# Patient Record
Sex: MALE | Race: BLACK OR AFRICAN AMERICAN | NOT HISPANIC OR LATINO | Employment: OTHER | URBAN - METROPOLITAN AREA
[De-identification: names, ages, dates, MRNs, and addresses within clinical notes are randomized per-mention and may not be internally consistent; named-entity substitution may affect disease eponyms.]

---

## 2018-01-16 ENCOUNTER — HOSPITAL ENCOUNTER (EMERGENCY)
Facility: HOSPITAL | Age: 68
Discharge: HOME | End: 2018-01-16
Attending: EMERGENCY MEDICINE
Payer: COMMERCIAL

## 2018-01-16 VITALS
SYSTOLIC BLOOD PRESSURE: 152 MMHG | BODY MASS INDEX: 25.73 KG/M2 | HEIGHT: 72 IN | TEMPERATURE: 98 F | WEIGHT: 190 LBS | RESPIRATION RATE: 18 BRPM | OXYGEN SATURATION: 99 % | DIASTOLIC BLOOD PRESSURE: 92 MMHG | HEART RATE: 68 BPM

## 2018-01-16 DIAGNOSIS — R07.9 CHEST PAIN: ICD-10-CM

## 2018-01-16 DIAGNOSIS — T14.8XXA MUSCLE STRAIN: Primary | ICD-10-CM
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
## 2018-01-16 LAB
ALBUMIN SERPL BCP-MCNC: 3.7 G/DL
ALP SERPL-CCNC: 70 U/L
ALT SERPL W/O P-5'-P-CCNC: 38 U/L
ANION GAP SERPL CALC-SCNC: 7 MMOL/L
AST SERPL-CCNC: 27 U/L
BASOPHILS # BLD AUTO: 0.04 K/UL
BASOPHILS NFR BLD: 0.7 %
BILIRUB SERPL-MCNC: 0.4 MG/DL
BUN SERPL-MCNC: 13 MG/DL
CALCIUM SERPL-MCNC: 9.4 MG/DL
CHLORIDE SERPL-SCNC: 104 MMOL/L
CO2 SERPL-SCNC: 30 MMOL/L
CREAT SERPL-MCNC: 1.3 MG/DL
DIFFERENTIAL METHOD: ABNORMAL
EOSINOPHIL # BLD AUTO: 0.2 K/UL
EOSINOPHIL NFR BLD: 3.3 %
ERYTHROCYTE [DISTWIDTH] IN BLOOD BY AUTOMATED COUNT: 12.6 %
EST. GFR  (AFRICAN AMERICAN): >60 ML/MIN/1.73 M^2
EST. GFR  (NON AFRICAN AMERICAN): 57.3 ML/MIN/1.73 M^2
GLUCOSE SERPL-MCNC: 103 MG/DL
HCT VFR BLD AUTO: 37.8 %
HGB BLD-MCNC: 13 G/DL
IMM GRANULOCYTES # BLD AUTO: 0.01 K/UL
IMM GRANULOCYTES NFR BLD AUTO: 0.2 %
LYMPHOCYTES # BLD AUTO: 2.8 K/UL
LYMPHOCYTES NFR BLD: 47.6 %
MCH RBC QN AUTO: 31 PG
MCHC RBC AUTO-ENTMCNC: 34.4 G/DL
MCV RBC AUTO: 90 FL
MONOCYTES # BLD AUTO: 0.4 K/UL
MONOCYTES NFR BLD: 6.7 %
NEUTROPHILS # BLD AUTO: 2.4 K/UL
NEUTROPHILS NFR BLD: 41.5 %
NRBC BLD-RTO: 0 /100 WBC
PLATELET # BLD AUTO: 179 K/UL
PMV BLD AUTO: 10.4 FL
POTASSIUM SERPL-SCNC: 3.9 MMOL/L
PROT SERPL-MCNC: 7.7 G/DL
RBC # BLD AUTO: 4.2 M/UL
SODIUM SERPL-SCNC: 141 MMOL/L
TROPONIN I SERPL DL<=0.01 NG/ML-MCNC: <0.006 NG/ML
WBC # BLD AUTO: 5.84 K/UL

## 2018-01-16 PROCEDURE — 93010 ELECTROCARDIOGRAM REPORT: CPT | Mod: ,,, | Performed by: INTERNAL MEDICINE

## 2018-01-16 PROCEDURE — 80053 COMPREHEN METABOLIC PANEL: CPT

## 2018-01-16 PROCEDURE — 84484 ASSAY OF TROPONIN QUANT: CPT

## 2018-01-16 PROCEDURE — 85025 COMPLETE CBC W/AUTO DIFF WBC: CPT

## 2018-01-16 PROCEDURE — 93005 ELECTROCARDIOGRAM TRACING: CPT

## 2018-01-16 PROCEDURE — 99285 EMERGENCY DEPT VISIT HI MDM: CPT | Mod: SA,,, | Performed by: PHYSICIAN ASSISTANT

## 2018-01-16 PROCEDURE — 99284 EMERGENCY DEPT VISIT MOD MDM: CPT | Mod: 25

## 2018-01-16 PROCEDURE — 25000003 PHARM REV CODE 250: Performed by: EMERGENCY MEDICINE

## 2018-01-16 RX ORDER — ASPIRIN 325 MG
325 TABLET ORAL
Status: COMPLETED | OUTPATIENT
Start: 2018-01-16 | End: 2018-01-16

## 2018-01-16 RX ORDER — NAPROXEN 500 MG/1
500 TABLET ORAL 2 TIMES DAILY WITH MEALS
Qty: 12 TABLET | Refills: 0 | Status: SHIPPED | OUTPATIENT
Start: 2018-01-16 | End: 2019-12-16

## 2018-01-16 RX ORDER — NAPROXEN SODIUM 220 MG/1
325 TABLET, FILM COATED ORAL
Status: DISCONTINUED | OUTPATIENT
Start: 2018-01-16 | End: 2018-01-16

## 2018-01-16 RX ADMIN — ASPIRIN 325 MG ORAL TABLET 325 MG: 325 PILL ORAL at 04:01

## 2018-01-16 NOTE — ED TRIAGE NOTES
Presents to ER with right upper chest pain since this am.    Pt identifiers checked and correct  LOC: The patient is awake, alert, aware of environment with an appropriate affect. Oriented x3, speaking appropriately  APPEARANCE: Pt resting comfortably, in no acute distress, pt is clean and well groomed, clothing properly fastened  SKIN: Skin warm, dry and intact, normal skin turgor, moist mucus membranes  RESPIRATORY: Airway is open and patent, respirations are spontaneous, even and unlabored, normal effort and rate  MUSCULOSKELETAL: No obvious deformities.

## 2018-01-16 NOTE — ED PROVIDER NOTES
I begin is a full preventive male Encounter Date: 1/16/2018    SCRIBE #1 NOTE: I, Ashlee Billingsley, am scribing for, and in the presence of,  Dr. Torres. I have scribed the following portions of the note - the EKG reading.       History     Chief Complaint   Patient presents with    Chest Pain     denies cardiac hx, pain to r side of chest increases with 'turning     Patient is a healthy 67-year-old gentleman with a past medical hypertension and HLD who presents the ED with right-sided rib pain.  Patient states that earlier, he was helping someone push up there window when he developed acute onset of right-sided back pain.  His rib pain has been constant.  He complains of 6/10 pain to the area.  The pain is not worse with deep inspiration.  He denies any fever, chills, cough, or SOB.  He did not take anything for pain relief.  Patient states that he works out 4 times a week and does not develop any symptoms.          Review of patient's allergies indicates:  No Known Allergies  Past Medical History:   Diagnosis Date    Hypertension      Past Surgical History:   Procedure Laterality Date    CARPAL TUNNEL RELEASE      right/left    SHOULDER OPEN ROTATOR CUFF REPAIR      left    WRIST SURGERY      right/left     Family History   Problem Relation Age of Onset    Diabetes Mother     Stroke Father      Social History   Substance Use Topics    Smoking status: Never Smoker    Smokeless tobacco: Never Used    Alcohol use No     Review of Systems   Constitutional: Negative for chills and fever.   HENT: Negative for sore throat.    Respiratory: Negative for cough and shortness of breath.    Cardiovascular: Positive for chest pain. Negative for leg swelling.   Gastrointestinal: Negative for nausea.   Genitourinary: Negative for dysuria.   Musculoskeletal: Negative for back pain.   Skin: Negative for rash.   Neurological: Negative for weakness.   Hematological: Does not bruise/bleed easily.       Physical Exam     Initial  Vitals [01/16/18 1359]   BP Pulse Resp Temp SpO2   136/82 82 16 97.7 °F (36.5 °C) 98 %      MAP       100         Physical Exam    Vitals reviewed.  Constitutional: He appears well-developed and well-nourished. He is not diaphoretic. No distress.   HENT:   Head: Normocephalic and atraumatic.   Nose: Nose normal.   Eyes: Conjunctivae and EOM are normal.   Neck: Normal range of motion.   Cardiovascular: Normal rate, regular rhythm and normal heart sounds. Exam reveals no friction rub.    No murmur heard.  No calf pain or peripheral edema.   Pulmonary/Chest: Breath sounds normal. No respiratory distress. He has no wheezes. He has no rales.   No chest wall tenderness to palpation.   Abdominal: Soft. Bowel sounds are normal. He exhibits no distension. There is no tenderness.   Musculoskeletal: Normal range of motion.   Neurological: He is alert and oriented to person, place, and time. He has normal strength. No sensory deficit.   Skin: Skin is warm and dry. No erythema.   Skin without erythema, edema, ecchymosis, or rashes.   Psychiatric: He has a normal mood and affect. Thought content normal.         ED Course   Procedures  Labs Reviewed   CBC W/ AUTO DIFFERENTIAL - Abnormal; Notable for the following:        Result Value    RBC 4.20 (*)     Hemoglobin 13.0 (*)     Hematocrit 37.8 (*)     All other components within normal limits   COMPREHENSIVE METABOLIC PANEL - Abnormal; Notable for the following:     CO2 30 (*)     Anion Gap 7 (*)     eGFR if non  57.3 (*)     All other components within normal limits   TROPONIN I     EKG Readings: (Independently Interpreted)   NSR at 86 BPM.       X-Rays:   Independently Interpreted Readings:   Chest X-Ray: Normal heart size.  No infiltrates.  No acute abnormalities.     Medical Decision Making:   History:   Old Medical Records: I decided to obtain old medical records.  Independently Interpreted Test(s):   I have ordered and independently interpreted EKG Reading(s)  - see prior notes  Clinical Tests:   Lab Tests: Ordered and Reviewed  Radiological Study: Ordered and Reviewed  Medical Tests: Reviewed and Ordered    Imaging Results          X-Ray Chest PA And Lateral (Final result)  Result time 01/16/18 17:50:49    Final result by Sid Mattson MD (01/16/18 17:50:49)                 Impression:      No acute cardiothoracic abnormalities.  ______________________________________     Electronically signed by resident: CECELIA MARTELL  Date:     01/16/18  Time:    17:37            As the supervising and teaching physician, I personally reviewed the images and resident's interpretation and I agree with the findings.          Electronically signed by: SID MATTSON MD, MD  Date:     01/16/18  Time:    17:50              Narrative:    CLINICAL HISTORY:  Chest pain.    TECHNIQUE: PA and lateral views of the chest    COMPARISON: No priors      FINDINGS:  Support devices: None    Chest: Cardiac and mediastinal silhouettes are normal.  Lungs are well aerated and clear.  No pleural effusion or pneumothorax. Included osseous structures appear intact.    Upper Abdomen: Normal.                                 APC / Resident Notes:   Patient presents the ED with right sided rib/chest pain this morning.    On exam, vital signs stable.  Heart and lung exam unremarkable. No chest wall tenderness to palpation.      Will do ACS workup given history of high blood pressure and HLD, although low suspicion.    ECG with NSR at 86 bpm.  CBC with no leukocytosis.  H/H of 13/37.8.  CMP with a little abnormalities.    Negative troponin.    Chest x-ray with no acute cardiothoracic abnormalities.  Osseous structures appear intact.      Patient updated with results.  He reports feeling fine.  I will treat patient for chest wall muscle strain.  I will prescribe her naproxen for pain relief.  He is to follow-up with PCP. Return to ED precaution given.  Patient is comfortable with plan and stable for discharge.  I  reviewed patient's chart and discussed this case with my supervising LENORE Lewis Attestation:   Scribe #1: I performed the above scribed service and the documentation accurately describes the services I performed. I attest to the accuracy of the note.    Attending Attestation:     Physician Attestation Statement for NP/PA:   I discussed this assessment and plan of this patient with the NP/PA, but I did not personally examine the patient. The face to face encounter was performed by the NP/PA.    Other NP/PA Attestation Additions:    History of Present Illness: 67-year-old man complains of chest pain after heavy lifting.    Medical Decision Making: This occurred this morning and the pain has been present since then.  One troponin is sufficient to rule out ACS.  This was sent and this was normal.  Suspect musculoskeletal chest pain.       Physician Attestation for Scribe:      Comments: I, Dr. January Herr, personally performed the services described in this documentation. All medical record entries made by the scribe were at my direction and in my presence.  I have reviewed the chart and agree that the record reflects my personal performance and is accurate and complete. January Herr MD.              ED Course      Clinical Impression:   The primary encounter diagnosis was Muscle strain. A diagnosis of Chest pain was also pertinent to this visit.    Disposition:   Disposition: Discharged  Condition: Stable                        Sabrina Stewart PA-C  01/18/18 4818

## 2018-01-16 NOTE — PROVIDER PROGRESS NOTES - EMERGENCY DEPT.
Encounter Date: 1/16/2018    ED Physician Progress Notes         EKG - STEMI Decision  Initial Reading: No STEMI present.  Response: No Action Needed.

## 2018-01-16 NOTE — DISCHARGE INSTRUCTIONS
You have a muscle strain in your chest wall.    Take nonsteroidal anti-inflammatories (naproxen) 2 times a day which is every 12 hours with meals for pain relief.  Follow with primary care physician    No future appointments.    Our goal in the emergency department is to always give you outstanding care and exceptional service. You may receive a survey by mail or e-mail in the next week regarding your experience in our ED. We would greatly appreciate your completing and returning the survey. Your feedback provides us with a way to recognize our staff who give very good care and it helps us learn how to improve when your experience was below our aspiration of excellence.

## 2018-01-17 NOTE — ED NOTES
Discharge home, states understanding to follow up as directed. Ambulates out of ED without difficulty.

## 2019-12-05 ENCOUNTER — HOSPITAL ENCOUNTER (OUTPATIENT)
Dept: RADIOLOGY | Facility: OTHER | Age: 69
Discharge: HOME | End: 2019-12-05
Attending: INTERNAL MEDICINE
Payer: MEDICARE

## 2019-12-05 DIAGNOSIS — Z86.73 PERSONAL HISTORY OF TIA (TRANSIENT ISCHEMIC ATTACK): ICD-10-CM

## 2019-12-05 PROCEDURE — 93880 EXTRACRANIAL BILAT STUDY: CPT | Mod: TC

## 2019-12-05 PROCEDURE — 93880 US CAROTID BILATERAL: ICD-10-PCS | Mod: 26,,, | Performed by: RADIOLOGY

## 2019-12-05 PROCEDURE — 93880 EXTRACRANIAL BILAT STUDY: CPT | Mod: 26,,, | Performed by: RADIOLOGY

## 2019-12-16 ENCOUNTER — HOSPITAL ENCOUNTER (INPATIENT)
Facility: OTHER | Age: 69
LOS: 2 days | Discharge: HOME | DRG: 066 | End: 2019-12-18
Attending: EMERGENCY MEDICINE | Admitting: INTERNAL MEDICINE
Payer: MEDICARE

## 2019-12-16 DIAGNOSIS — I63.81 ACUTE THALAMIC INFARCTION: Primary | ICD-10-CM

## 2019-12-16 DIAGNOSIS — I63.9 STROKE: ICD-10-CM

## 2019-12-16 DIAGNOSIS — G45.9 TIA (TRANSIENT ISCHEMIC ATTACK): ICD-10-CM

## 2019-12-16 DIAGNOSIS — E78.2 MIXED HYPERLIPIDEMIA: ICD-10-CM

## 2019-12-16 DIAGNOSIS — I10 ESSENTIAL HYPERTENSION: ICD-10-CM

## 2019-12-16 DIAGNOSIS — E11.9 TYPE 2 DIABETES MELLITUS WITHOUT COMPLICATION, WITHOUT LONG-TERM CURRENT USE OF INSULIN: ICD-10-CM

## 2019-12-16 DIAGNOSIS — R20.0 NUMBNESS AND TINGLING OF LEFT UPPER AND LOWER EXTREMITY: ICD-10-CM

## 2019-12-16 DIAGNOSIS — R07.89 CHEST PRESSURE: ICD-10-CM

## 2019-12-16 DIAGNOSIS — R20.2 NUMBNESS AND TINGLING OF LEFT UPPER AND LOWER EXTREMITY: ICD-10-CM

## 2019-12-16 PROBLEM — E78.5 HLD (HYPERLIPIDEMIA): Status: ACTIVE | Noted: 2019-12-16

## 2019-12-16 LAB
ALBUMIN SERPL BCP-MCNC: 4.1 G/DL (ref 3.5–5.2)
ALP SERPL-CCNC: 63 U/L (ref 55–135)
ALT SERPL W/O P-5'-P-CCNC: 49 U/L (ref 10–44)
ANION GAP SERPL CALC-SCNC: 9 MMOL/L (ref 8–16)
AST SERPL-CCNC: 33 U/L (ref 10–40)
BASOPHILS # BLD AUTO: 0.04 K/UL (ref 0–0.2)
BASOPHILS NFR BLD: 0.7 % (ref 0–1.9)
BILIRUB SERPL-MCNC: 0.4 MG/DL (ref 0.1–1)
BUN SERPL-MCNC: 15 MG/DL (ref 8–23)
CALCIUM SERPL-MCNC: 9.7 MG/DL (ref 8.7–10.5)
CHLORIDE SERPL-SCNC: 103 MMOL/L (ref 95–110)
CO2 SERPL-SCNC: 28 MMOL/L (ref 23–29)
CREAT SERPL-MCNC: 1.1 MG/DL (ref 0.5–1.4)
DIFFERENTIAL METHOD: ABNORMAL
EOSINOPHIL # BLD AUTO: 0.1 K/UL (ref 0–0.5)
EOSINOPHIL NFR BLD: 1.5 % (ref 0–8)
ERYTHROCYTE [DISTWIDTH] IN BLOOD BY AUTOMATED COUNT: 12.4 % (ref 11.5–14.5)
EST. GFR  (AFRICAN AMERICAN): >60 ML/MIN/1.73 M^2
EST. GFR  (NON AFRICAN AMERICAN): >60 ML/MIN/1.73 M^2
GLUCOSE SERPL-MCNC: 118 MG/DL (ref 70–110)
HCT VFR BLD AUTO: 39.5 % (ref 40–54)
HGB BLD-MCNC: 13.3 G/DL (ref 14–18)
IMM GRANULOCYTES # BLD AUTO: 0.01 K/UL (ref 0–0.04)
IMM GRANULOCYTES NFR BLD AUTO: 0.2 % (ref 0–0.5)
LYMPHOCYTES # BLD AUTO: 2.9 K/UL (ref 1–4.8)
LYMPHOCYTES NFR BLD: 49.3 % (ref 18–48)
MAGNESIUM SERPL-MCNC: 1.8 MG/DL (ref 1.6–2.6)
MCH RBC QN AUTO: 30.3 PG (ref 27–31)
MCHC RBC AUTO-ENTMCNC: 33.7 G/DL (ref 32–36)
MCV RBC AUTO: 90 FL (ref 82–98)
MONOCYTES # BLD AUTO: 0.5 K/UL (ref 0.3–1)
MONOCYTES NFR BLD: 8.7 % (ref 4–15)
NEUTROPHILS # BLD AUTO: 2.3 K/UL (ref 1.8–7.7)
NEUTROPHILS NFR BLD: 39.6 % (ref 38–73)
NRBC BLD-RTO: 0 /100 WBC
PHOSPHATE SERPL-MCNC: 2.8 MG/DL (ref 2.7–4.5)
PLATELET # BLD AUTO: 181 K/UL (ref 150–350)
PMV BLD AUTO: 10.4 FL (ref 9.2–12.9)
POCT GLUCOSE: 124 MG/DL (ref 70–110)
POTASSIUM SERPL-SCNC: 4.1 MMOL/L (ref 3.5–5.1)
PROT SERPL-MCNC: 7.8 G/DL (ref 6–8.4)
RBC # BLD AUTO: 4.39 M/UL (ref 4.6–6.2)
SODIUM SERPL-SCNC: 140 MMOL/L (ref 136–145)
TROPONIN I SERPL DL<=0.01 NG/ML-MCNC: <0.006 NG/ML (ref 0–0.03)
TSH SERPL DL<=0.005 MIU/L-ACNC: 1.18 UIU/ML (ref 0.4–4)
WBC # BLD AUTO: 5.84 K/UL (ref 3.9–12.7)

## 2019-12-16 PROCEDURE — 84484 ASSAY OF TROPONIN QUANT: CPT

## 2019-12-16 PROCEDURE — 80061 LIPID PANEL: CPT

## 2019-12-16 PROCEDURE — 83036 HEMOGLOBIN GLYCOSYLATED A1C: CPT

## 2019-12-16 PROCEDURE — 36000 PLACE NEEDLE IN VEIN: CPT

## 2019-12-16 PROCEDURE — 25500020 PHARM REV CODE 255: Performed by: EMERGENCY MEDICINE

## 2019-12-16 PROCEDURE — G0378 HOSPITAL OBSERVATION PER HR: HCPCS

## 2019-12-16 PROCEDURE — 93010 ELECTROCARDIOGRAM REPORT: CPT | Mod: ,,, | Performed by: INTERNAL MEDICINE

## 2019-12-16 PROCEDURE — A9585 GADOBUTROL INJECTION: HCPCS | Performed by: EMERGENCY MEDICINE

## 2019-12-16 PROCEDURE — 83735 ASSAY OF MAGNESIUM: CPT

## 2019-12-16 PROCEDURE — 93010 EKG 12-LEAD: ICD-10-PCS | Mod: ,,, | Performed by: INTERNAL MEDICINE

## 2019-12-16 PROCEDURE — 99223 1ST HOSP IP/OBS HIGH 75: CPT | Mod: ,,, | Performed by: PHYSICIAN ASSISTANT

## 2019-12-16 PROCEDURE — 80053 COMPREHEN METABOLIC PANEL: CPT

## 2019-12-16 PROCEDURE — 84443 ASSAY THYROID STIM HORMONE: CPT

## 2019-12-16 PROCEDURE — 99285 EMERGENCY DEPT VISIT HI MDM: CPT | Mod: 25

## 2019-12-16 PROCEDURE — 84100 ASSAY OF PHOSPHORUS: CPT

## 2019-12-16 PROCEDURE — 93005 ELECTROCARDIOGRAM TRACING: CPT

## 2019-12-16 PROCEDURE — 94761 N-INVAS EAR/PLS OXIMETRY MLT: CPT

## 2019-12-16 PROCEDURE — 99223 PR INITIAL HOSPITAL CARE,LEVL III: ICD-10-PCS | Mod: ,,, | Performed by: PHYSICIAN ASSISTANT

## 2019-12-16 PROCEDURE — 85025 COMPLETE CBC W/AUTO DIFF WBC: CPT

## 2019-12-16 PROCEDURE — 36415 COLL VENOUS BLD VENIPUNCTURE: CPT

## 2019-12-16 PROCEDURE — 25000003 PHARM REV CODE 250: Performed by: PHYSICIAN ASSISTANT

## 2019-12-16 RX ORDER — BRIMONIDINE TARTRATE AND TIMOLOL MALEATE 2; 5 MG/ML; MG/ML
1 SOLUTION OPHTHALMIC
COMMUNITY

## 2019-12-16 RX ORDER — INSULIN ASPART 100 [IU]/ML
0-5 INJECTION, SOLUTION INTRAVENOUS; SUBCUTANEOUS
Status: DISCONTINUED | OUTPATIENT
Start: 2019-12-16 | End: 2019-12-18 | Stop reason: HOSPADM

## 2019-12-16 RX ORDER — GLUCAGON 1 MG
1 KIT INJECTION
Status: DISCONTINUED | OUTPATIENT
Start: 2019-12-16 | End: 2019-12-18 | Stop reason: HOSPADM

## 2019-12-16 RX ORDER — BENAZEPRIL HYDROCHLORIDE 5 MG/1
20 TABLET ORAL DAILY
Status: DISCONTINUED | OUTPATIENT
Start: 2019-12-17 | End: 2019-12-17

## 2019-12-16 RX ORDER — TEMAZEPAM 7.5 MG/1
15 CAPSULE ORAL NIGHTLY PRN
COMMUNITY
End: 2023-06-12 | Stop reason: CLARIF

## 2019-12-16 RX ORDER — ATORVASTATIN CALCIUM 20 MG/1
20 TABLET, FILM COATED ORAL DAILY
Status: DISCONTINUED | OUTPATIENT
Start: 2019-12-17 | End: 2019-12-17

## 2019-12-16 RX ORDER — SODIUM CHLORIDE 0.9 % (FLUSH) 0.9 %
10 SYRINGE (ML) INJECTION
Status: DISCONTINUED | OUTPATIENT
Start: 2019-12-16 | End: 2019-12-17

## 2019-12-16 RX ORDER — ASPIRIN 325 MG
325 TABLET ORAL DAILY
COMMUNITY
End: 2019-12-16

## 2019-12-16 RX ORDER — IBUPROFEN 200 MG
24 TABLET ORAL
Status: DISCONTINUED | OUTPATIENT
Start: 2019-12-16 | End: 2019-12-18 | Stop reason: HOSPADM

## 2019-12-16 RX ORDER — TAMSULOSIN HYDROCHLORIDE 0.4 MG/1
0.4 CAPSULE ORAL DAILY
COMMUNITY

## 2019-12-16 RX ORDER — IBUPROFEN 200 MG
16 TABLET ORAL
Status: DISCONTINUED | OUTPATIENT
Start: 2019-12-16 | End: 2019-12-18 | Stop reason: HOSPADM

## 2019-12-16 RX ORDER — TAMSULOSIN HYDROCHLORIDE 0.4 MG/1
0.4 CAPSULE ORAL DAILY
Status: DISCONTINUED | OUTPATIENT
Start: 2019-12-17 | End: 2019-12-18 | Stop reason: HOSPADM

## 2019-12-16 RX ORDER — ATORVASTATIN CALCIUM 20 MG/1
20 TABLET, FILM COATED ORAL DAILY
Status: ON HOLD | COMMUNITY
End: 2019-12-18 | Stop reason: HOSPADM

## 2019-12-16 RX ORDER — METFORMIN HYDROCHLORIDE 1000 MG/1
1000 TABLET, FILM COATED, EXTENDED RELEASE ORAL 2 TIMES DAILY WITH MEALS
COMMUNITY

## 2019-12-16 RX ORDER — ACETAMINOPHEN 325 MG/1
650 TABLET ORAL EVERY 4 HOURS PRN
Status: DISCONTINUED | OUTPATIENT
Start: 2019-12-16 | End: 2019-12-18 | Stop reason: HOSPADM

## 2019-12-16 RX ORDER — BENAZEPRIL HYDROCHLORIDE 20 MG/1
20 TABLET ORAL DAILY
COMMUNITY
End: 2021-09-16 | Stop reason: SDUPTHER

## 2019-12-16 RX ORDER — GADOBUTROL 604.72 MG/ML
10 INJECTION INTRAVENOUS
Status: COMPLETED | OUTPATIENT
Start: 2019-12-16 | End: 2019-12-16

## 2019-12-16 RX ORDER — ONDANSETRON 8 MG/1
8 TABLET, ORALLY DISINTEGRATING ORAL EVERY 8 HOURS PRN
Status: DISCONTINUED | OUTPATIENT
Start: 2019-12-16 | End: 2019-12-18 | Stop reason: HOSPADM

## 2019-12-16 RX ADMIN — BIMATOPROST 1 DROP: 0.1 SOLUTION/ DROPS OPHTHALMIC at 10:12

## 2019-12-16 RX ADMIN — GADOBUTROL 10 ML: 604.72 INJECTION INTRAVENOUS at 06:12
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
## 2019-12-16 NOTE — ED NOTES
AYANNA Dudley called to triage for evaluation. No neuro deficits noted. No facial droop, no pronator drift.  CT ordered, code stroke not activated.

## 2019-12-17 ENCOUNTER — HOSPITAL ENCOUNTER (OUTPATIENT)
Dept: CARDIOLOGY | Facility: OTHER | Age: 69
Discharge: HOME | DRG: 066 | End: 2019-12-17
Payer: MEDICARE

## 2019-12-17 ENCOUNTER — TELEPHONE (OUTPATIENT)
Dept: NEUROLOGY | Facility: CLINIC | Age: 69
End: 2019-12-17

## 2019-12-17 PROBLEM — I63.81 ACUTE THALAMIC INFARCTION: Status: ACTIVE | Noted: 2019-12-17

## 2019-12-17 PROBLEM — E78.2 MIXED HYPERLIPIDEMIA: Status: ACTIVE | Noted: 2019-12-16

## 2019-12-17 PROBLEM — I63.9 STROKE: Status: ACTIVE | Noted: 2019-12-17

## 2019-12-17 LAB
BILIRUB UR QL STRIP: NEGATIVE
CHOLEST SERPL-MCNC: 153 MG/DL (ref 120–199)
CHOLEST/HDLC SERPL: 3.3 {RATIO} (ref 2–5)
CLARITY UR: CLEAR
COLOR UR: YELLOW
ESTIMATED AVG GLUCOSE: 151 MG/DL (ref 68–131)
GLUCOSE UR QL STRIP: NEGATIVE
HBA1C MFR BLD HPLC: 6.9 % (ref 4–5.6)
HDLC SERPL-MCNC: 46 MG/DL (ref 40–75)
HDLC SERPL: 30.1 % (ref 20–50)
HGB UR QL STRIP: NEGATIVE
KETONES UR QL STRIP: NEGATIVE
LDLC SERPL CALC-MCNC: 62 MG/DL (ref 63–159)
LEUKOCYTE ESTERASE UR QL STRIP: NEGATIVE
NITRITE UR QL STRIP: NEGATIVE
NONHDLC SERPL-MCNC: 107 MG/DL
PH UR STRIP: 7 [PH] (ref 5–8)
POCT GLUCOSE: 110 MG/DL (ref 70–110)
POCT GLUCOSE: 138 MG/DL (ref 70–110)
POCT GLUCOSE: 147 MG/DL (ref 70–110)
POCT GLUCOSE: 95 MG/DL (ref 70–110)
PROT UR QL STRIP: NEGATIVE
SP GR UR STRIP: <=1.005 (ref 1–1.03)
TRIGL SERPL-MCNC: 225 MG/DL (ref 30–150)
URN SPEC COLLECT METH UR: ABNORMAL
UROBILINOGEN UR STRIP-ACNC: NEGATIVE EU/DL
VIT B12 SERPL-MCNC: 283 PG/ML (ref 210–950)

## 2019-12-17 PROCEDURE — 36415 COLL VENOUS BLD VENIPUNCTURE: CPT

## 2019-12-17 PROCEDURE — 92523 SPEECH SOUND LANG COMPREHEN: CPT

## 2019-12-17 PROCEDURE — 94761 N-INVAS EAR/PLS OXIMETRY MLT: CPT

## 2019-12-17 PROCEDURE — 99223 1ST HOSP IP/OBS HIGH 75: CPT | Mod: ,,, | Performed by: PSYCHIATRY & NEUROLOGY

## 2019-12-17 PROCEDURE — 93306 TTE W/DOPPLER COMPLETE: CPT

## 2019-12-17 PROCEDURE — 93306 ECHO (CUPID ONLY): ICD-10-PCS | Mod: 26,,, | Performed by: INTERNAL MEDICINE

## 2019-12-17 PROCEDURE — 99223 PR INITIAL HOSPITAL CARE,LEVL III: ICD-10-PCS | Mod: ,,, | Performed by: PSYCHIATRY & NEUROLOGY

## 2019-12-17 PROCEDURE — 25000003 PHARM REV CODE 250: Performed by: PHYSICIAN ASSISTANT

## 2019-12-17 PROCEDURE — 11000001 HC ACUTE MED/SURG PRIVATE ROOM

## 2019-12-17 PROCEDURE — 97161 PT EVAL LOW COMPLEX 20 MIN: CPT

## 2019-12-17 PROCEDURE — 92610 EVALUATE SWALLOWING FUNCTION: CPT

## 2019-12-17 PROCEDURE — 25500020 PHARM REV CODE 255: Performed by: INTERNAL MEDICINE

## 2019-12-17 PROCEDURE — 97165 OT EVAL LOW COMPLEX 30 MIN: CPT

## 2019-12-17 PROCEDURE — 25000003 PHARM REV CODE 250: Performed by: NURSE PRACTITIONER

## 2019-12-17 PROCEDURE — 63600175 PHARM REV CODE 636 W HCPCS: Performed by: NURSE PRACTITIONER

## 2019-12-17 PROCEDURE — 99233 PR SUBSEQUENT HOSPITAL CARE,LEVL III: ICD-10-PCS | Mod: ,,, | Performed by: NURSE PRACTITIONER

## 2019-12-17 PROCEDURE — 99233 SBSQ HOSP IP/OBS HIGH 50: CPT | Mod: ,,, | Performed by: NURSE PRACTITIONER

## 2019-12-17 PROCEDURE — 81003 URINALYSIS AUTO W/O SCOPE: CPT

## 2019-12-17 PROCEDURE — 93306 TTE W/DOPPLER COMPLETE: CPT | Mod: 26,,, | Performed by: INTERNAL MEDICINE

## 2019-12-17 PROCEDURE — 82607 VITAMIN B-12: CPT

## 2019-12-17 RX ORDER — ATORVASTATIN CALCIUM 20 MG/1
40 TABLET, FILM COATED ORAL DAILY
Status: DISCONTINUED | OUTPATIENT
Start: 2019-12-17 | End: 2019-12-17

## 2019-12-17 RX ORDER — CLOPIDOGREL BISULFATE 75 MG/1
75 TABLET ORAL DAILY
Status: DISCONTINUED | OUTPATIENT
Start: 2019-12-17 | End: 2019-12-18 | Stop reason: HOSPADM

## 2019-12-17 RX ORDER — LABETALOL HYDROCHLORIDE 5 MG/ML
10 INJECTION, SOLUTION INTRAVENOUS
Status: DISCONTINUED | OUTPATIENT
Start: 2019-12-17 | End: 2019-12-18 | Stop reason: HOSPADM

## 2019-12-17 RX ORDER — ENOXAPARIN SODIUM 100 MG/ML
40 INJECTION SUBCUTANEOUS EVERY 24 HOURS
Status: DISCONTINUED | OUTPATIENT
Start: 2019-12-17 | End: 2019-12-18 | Stop reason: HOSPADM

## 2019-12-17 RX ORDER — ASPIRIN 81 MG/1
81 TABLET ORAL DAILY
Status: DISCONTINUED | OUTPATIENT
Start: 2019-12-17 | End: 2019-12-17

## 2019-12-17 RX ORDER — ASPIRIN 81 MG/1
81 TABLET ORAL DAILY
Status: DISCONTINUED | OUTPATIENT
Start: 2019-12-17 | End: 2019-12-18 | Stop reason: HOSPADM

## 2019-12-17 RX ORDER — SODIUM CHLORIDE 0.9 % (FLUSH) 0.9 %
10 SYRINGE (ML) INJECTION
Status: DISCONTINUED | OUTPATIENT
Start: 2019-12-17 | End: 2019-12-18 | Stop reason: HOSPADM

## 2019-12-17 RX ORDER — ATORVASTATIN CALCIUM 20 MG/1
40 TABLET, FILM COATED ORAL DAILY
Status: DISCONTINUED | OUTPATIENT
Start: 2019-12-17 | End: 2019-12-18 | Stop reason: HOSPADM

## 2019-12-17 RX ADMIN — ENOXAPARIN SODIUM 40 MG: 40 INJECTION SUBCUTANEOUS at 05:12

## 2019-12-17 RX ADMIN — BIMATOPROST 1 DROP: 0.1 SOLUTION/ DROPS OPHTHALMIC at 09:12

## 2019-12-17 RX ADMIN — ATORVASTATIN CALCIUM 40 MG: 20 TABLET, FILM COATED ORAL at 11:12

## 2019-12-17 RX ADMIN — ASPIRIN 81 MG: 81 TABLET, COATED ORAL at 11:12

## 2019-12-17 RX ADMIN — CLOPIDOGREL BISULFATE 75 MG: 75 TABLET ORAL at 11:12

## 2019-12-17 RX ADMIN — IOHEXOL 100 ML: 350 INJECTION, SOLUTION INTRAVENOUS at 09:12

## 2019-12-17 NOTE — PROGRESS NOTES
[facility] Medicine  Progress Note    Patient Name: Calderon Burt  MRN: [id number]  Patient Class: IP- Inpatient   Admission Date: 12/16/2019  Length of Stay: 1 days  Attending Physician: Geronimo Vargas MD  Primary Care Provider: Jigar Durant Iii, MD        Subjective:     Principal Problem:Acute thalamic infarction        HPI:  Henrietta Rae PA:  70 y/o male with PMH HTN, HLD, new diagnosis of DM presented to ED with c/o intermittent tingling/numbness to the left side of his tongue, face, left arm, and left leg for over a month. He has had tingling to his tongue and face for the past few days. Reported he has been seen by his PCP for this complaint in the past and recently had a carotid US that was reportedly unremarkable. Reported when he was diagnosed with DM recently he went to an all vegetable diet and lost about 10 pounds. His significant other was worried about his fast weight loss and recently made him add meat back to his diet. He stated he tries to keep in shape and exercises frequently. Reported today he felt weak on the left side making it difficult to ambulate and came to the ED. Denied any difficulty speaking or swallowing. Denies HA, dizziness, lightheadedness, CP, SOB, N/V/D, urinary symptoms. Nonsmoker, denied alcohol or illicit drug use. No new medications. ED evaluation labs unremarkable, CT Head Mild moderate size regions of left inferior frontal and anterior temporal encephalomalacia concerning for sequela of prior contusions.  No evidence for acute intracranial hemorrhage. Placed in Observation.        Interval History: MRI with acute right thalamic infarct; continue stroke work up; start aspirin 81 mg, plavix 75 mg daily and atorvastatin 40 mg daily; Neurology consult and PT/OT/SLP    Review of Systems   Constitutional: Negative for chills, fatigue and fever.   HENT: Negative for congestion and trouble swallowing.    Eyes: Negative for photophobia and visual  disturbance.   Respiratory: Negative for cough and shortness of breath.    Cardiovascular: Negative for chest pain and palpitations.   Gastrointestinal: Negative for abdominal distention, diarrhea, nausea and vomiting.   Endocrine: Negative for polydipsia and polyuria.   Genitourinary: Negative for decreased urine volume and dysuria.   Musculoskeletal: Negative for back pain and myalgias.   Skin: Negative.    Neurological: Positive for numbness. Negative for dizziness, speech difficulty, weakness and headaches.   Hematological: Does not bruise/bleed easily.   Psychiatric/Behavioral: Negative.      Objective:     Vital Signs (Most Recent):  Temp: 97.6 °F (36.4 °C) (12/17/19 0738)  Pulse: 65 (12/17/19 0738)  Resp: 14 (12/17/19 0738)  BP: 136/78 (12/17/19 0738)  SpO2: 99 % (12/17/19 0738) Vital Signs (24h Range):  Temp:  [97.6 °F (36.4 °C)-98.2 °F (36.8 °C)] 97.6 °F (36.4 °C)  Pulse:  [58-97] 65  Resp:  [14-30] 14  SpO2:  [96 %-100 %] 99 %  BP: (127-160)/(67-92) 136/78     Weight: 85.5 kg (188 lb 7.9 oz)  Body mass index is 25.56 kg/m².    Intake/Output Summary (Last 24 hours) at 12/17/2019 0829  Last data filed at 12/16/2019 2037  Gross per 24 hour   Intake 240 ml   Output --   Net 240 ml      Physical Exam   Constitutional: He is oriented to person, place, and time. He appears well-developed and well-nourished. No distress.   HENT:   Head: Normocephalic and atraumatic.   Mouth/Throat: Oropharynx is clear and moist.   Eyes: Pupils are equal, round, and reactive to light. Conjunctivae and EOM are normal.   Neck: Normal range of motion. Neck supple. No JVD present.   Cardiovascular: Normal rate, regular rhythm, normal heart sounds and intact distal pulses.   No murmur heard.  Pulmonary/Chest: Effort normal and breath sounds normal. He has no wheezes.   Abdominal: Soft. Bowel sounds are normal. There is no tenderness.   Musculoskeletal: Normal range of motion. He exhibits no edema.   Neurological: He is alert and  oriented to person, place, and time. He has normal strength. GCS eye subscore is 4. GCS verbal subscore is 5. GCS motor subscore is 6.   Skin: Skin is warm and dry.   Psychiatric: He has a normal mood and affect. His behavior is normal.   Nursing note and vitals reviewed.      Significant Labs:   A1C:   Recent Labs   Lab 12/16/19  1805   HGBA1C 6.9*     CBC:   Recent Labs   Lab 12/16/19  1543   WBC 5.84   HGB 13.3*   HCT 39.5*        CMP:   Recent Labs   Lab 12/16/19  1543      K 4.1      CO2 28   *   BUN 15   CREATININE 1.1   CALCIUM 9.7   PROT 7.8   ALBUMIN 4.1   BILITOT 0.4   ALKPHOS 63   AST 33   ALT 49*   ANIONGAP 9   EGFRNONAA >60     Lipid Panel:   Recent Labs   Lab 12/16/19  2219   CHOL 153   HDL 46   LDLCALC 62.0*   TRIG 225*   CHOLHDL 30.1     Troponin:   Recent Labs   Lab 12/16/19  1543   TROPONINI <0.006        All pertinent labs within the past 24 hours have been reviewed.    Significant Imaging: I have reviewed all pertinent imaging results/findings within the past 24 hours.     Imaging Results          MRA Neck with contrast (Final result)  Result time 12/16/19 20:02:52   Procedure changed from MRA Neck with and without contrast     Final result by Brian Licea MD (12/16/19 20:02:52)                 Impression:      The major arterial vascular structures of the neck demonstrate no evidence for high-grade stenosis or occlusion.      Electronically signed by: Brian Licea  Date:    12/16/2019  Time:    20:02             Narrative:    EXAMINATION:  MRA NECK WITH CONTRAST    CLINICAL HISTORY:  numnbess;    TECHNIQUE:  MRA examination of the neck was performed, the patient was administered 10 mL Gadavist intravenous contrast.    COMPARISON:  None    FINDINGS:  The visualized aspects of the common, internal and external carotid arteries as well as the vertebral arteries demonstrate signal consistent with appropriate flow, there is no evidence for high-grade stenosis or  occlusion, and no evidence for dissection of the aforementioned visualized arterial vascular structures of the neck.                               MRI Brain W WO Contrast (Final result)  Result time 12/16/19 19:57:50    Final result by Brian Licea MD (12/16/19 19:57:50)                 Impression:      Small focus of diffusion signal hyperintensity involving the right thalamus likely represents a small focal area of acute ischemia/infarct.  Additional chronic appearing intracranial changes are noted as well.      Electronically signed by: Brian Licea  Date:    12/16/2019  Time:    19:57             Narrative:    EXAMINATION:  MRI BRAIN W WO CONTRAST    CLINICAL HISTORY:  numbness;    TECHNIQUE:  MRI examination of brain was performed following the IV administration of 10 mL of Gadavist..    COMPARISON:  CT examination of the brain December 16, 2019    FINDINGS:  There is a small focus of diffusion signal hyperintensity noted involving the right thalamus, this is likely a small focal area of acute ischemia/infarct, there is no additional evidence for significant diffusion abnormality to specifically suggest additional areas of acute ischemia/infarct or other cause for restricted diffusion.    Chronic intracranial changes are noted, chronic appearing areas of T2 and FLAIR signal hyperintensity are noted, there is appearance of gliosis and encephalomalacia involving the left frontal lobe noted, this may relate to remote ischemia/infarct or other insult/injury.  There is no intracranial mass, mass effect or midline shift.  Appropriate CSF spaces are appropriate flow voids are noted at the skull base.  The intracranial venous sinuses appear appropriate.  After the administration of intravenous contrast a physiologic pattern of intracranial enhancement is noted.  The upper cervical cord, brainstem and craniocervical junction appear appropriate.  The visualized orbits appear intact.  Minimal paranasal sinus  mucosal thickening is noted.  The mastoid air cells demonstrate appropriate signal void.                               CT Head Without Contrast (Final result)  Result time 12/16/19 15:56:36    Final result by Landon Oakes DO (12/16/19 15:56:36)                 Impression:      Mild moderate size regions of left inferior frontal and anterior temporal encephalomalacia concerning for sequela of prior contusions.  No evidence for acute intracranial hemorrhage.  Clinical correlation and further evaluation as warranted..      Electronically signed by: Landon Oakes DO  Date:    12/16/2019  Time:    15:56             Narrative:    EXAMINATION:  CT HEAD WITHOUT CONTRAST    CLINICAL HISTORY:  Stroke;    TECHNIQUE:  Multiple sequential 5 mm axial images of the head without contrast.  Coronal and sagittal reformatted imaging from the axial acquisition.    COMPARISON:  None    FINDINGS:  There is mild moderate regions of encephalomalacia of the left inferior frontal and anterior temporal lobes while nonspecific most suggestive for sequela of prior contusions clinical correlation advised.  There is no evidence for acute intracranial hemorrhage or sulcal effacement to suggest large territory recent infarction.                                    Assessment/Plan:      * Acute thalamic infarction  - acute onset of left sided numbness/tingling on 12/16/2019 lasting approximately thirty minutes  - CT head Mild moderate size regions of left inferior frontal and anterior temporal encephalomalacia concerning for sequela of prior contusions.  No evidence for acute intracranial hemorrhage  - MRI brain with evidence of acute right thalamic infart with chronic gliosis and encephalomalacia left frontal lobe representing remote ischemia or prior trauma  - MRA neck without evidence for stenosis or occlusion; CTA brain pending  - Neurology consulted  - stroke work up with aspirin 81 mg, plavix 75 mg daily and atorvastatin 40 mg for secondary  stroke prevention  - aggressive risk factor management: type 2 DM reasonable controle with A1C 6.9, hypertriglyceridemia noted on lipid panel continue statin, allow for permissive hypertension for now SBP < 220 and > 130;  on lifestyle modifications  - dietician consult pending for assistance with dietary choices  - echocardiogram pending  - PT/OT/SLP evaluations pending  - ongoing stroke edcuation and when to call 911         Mixed hyperlipidemia  - elevated triglycerides and decreased LDL  - start high intensity statin for secondary stroke prevention, atorvastatin 40 mg daily  - dietician consult placed         Type 2 diabetes mellitus, without long-term current use of insulin  - hemoglobin A1c 6.9 with morning labs  - home regimen metformin 1000 mg twice daily  - low dose sliding scale for now and will adjust as needed  - diabetic diet with accuchecks  - monitor      Essential hypertension  - reasonably stable  - hold home blood pressure medications for permissive hypertension  - will monitor during stroke work up --SBP < 220 and > 130      Numbness and tingling of left upper and lower extremity  - left upper, lower extremity numbness worsened yesterday, but has had similar symptoms recently and evaluated with carotid doppler US on December 5, 2019 per his PCP  - now with acute right thalamic infarct on MRI brain  - continue stroke work up  - PT/OT/SLP and Neurology consulted  - fall precaution      VTE Risk Mitigation (From admission, onward)         Ordered     enoxaparin injection 40 mg  Daily      12/17/19 0857     IP VTE LOW RISK PATIENT  Once      12/16/19 163                      Jeni Gaffney NP  Department of Hospital Medicine   [facility]

## 2019-12-17 NOTE — HPI
Per AYANNA Diehl:  68 y/o male with PMH HTN, HLD, new diagnosis of DM presented to ED with c/o intermittent tingling/numbness to the left side of his tongue, face, left arm, and left leg for over a month. He has had tingling to his tongue and face for the past few days. Reported he has been seen by his PCP for this complaint in the past and recently had a carotid US that was reportedly unremarkable. Reported when he was diagnosed with DM recently he went to an all vegetable diet and lost about 10 pounds. His significant other was worried about his fast weight loss and recently made him add meat back to his diet. He stated he tries to keep in shape and exercises frequently. Reported today he felt weak on the left side making it difficult to ambulate and came to the ED. Denied any difficulty speaking or swallowing. Denies HA, dizziness, lightheadedness, CP, SOB, N/V/D, urinary symptoms. Nonsmoker, denied alcohol or illicit drug use. No new medications. ED evaluation labs unremarkable, CT Head Mild moderate size regions of left inferior frontal and anterior temporal encephalomalacia concerning for sequela of prior contusions.  No evidence for acute intracranial hemorrhage. Placed in Observation.

## 2019-12-17 NOTE — SUBJECTIVE & OBJECTIVE
Interval History: MRI with acute right thalamic infarct; continue stroke work up; start aspirin 81 mg, plavix 75 mg daily and atorvastatin 40 mg daily; Neurology consult and PT/OT/SLP    Review of Systems   Constitutional: Negative for chills, fatigue and fever.   HENT: Negative for congestion and trouble swallowing.    Eyes: Negative for photophobia and visual disturbance.   Respiratory: Negative for cough and shortness of breath.    Cardiovascular: Negative for chest pain and palpitations.   Gastrointestinal: Negative for abdominal distention, diarrhea, nausea and vomiting.   Endocrine: Negative for polydipsia and polyuria.   Genitourinary: Negative for decreased urine volume and dysuria.   Musculoskeletal: Negative for back pain and myalgias.   Skin: Negative.    Neurological: Positive for numbness. Negative for dizziness, speech difficulty, weakness and headaches.   Hematological: Does not bruise/bleed easily.   Psychiatric/Behavioral: Negative.      Objective:     Vital Signs (Most Recent):  Temp: 97.6 °F (36.4 °C) (12/17/19 0738)  Pulse: 65 (12/17/19 0738)  Resp: 14 (12/17/19 0738)  BP: 136/78 (12/17/19 0738)  SpO2: 99 % (12/17/19 0738) Vital Signs (24h Range):  Temp:  [97.6 °F (36.4 °C)-98.2 °F (36.8 °C)] 97.6 °F (36.4 °C)  Pulse:  [58-97] 65  Resp:  [14-30] 14  SpO2:  [96 %-100 %] 99 %  BP: (127-160)/(67-92) 136/78     Weight: 85.5 kg (188 lb 7.9 oz)  Body mass index is 25.56 kg/m².    Intake/Output Summary (Last 24 hours) at 12/17/2019 0829  Last data filed at 12/16/2019 2037  Gross per 24 hour   Intake 240 ml   Output --   Net 240 ml      Physical Exam   Constitutional: He is oriented to person, place, and time. He appears well-developed and well-nourished. No distress.   HENT:   Head: Normocephalic and atraumatic.   Mouth/Throat: Oropharynx is clear and moist.   Eyes: Pupils are equal, round, and reactive to light. Conjunctivae and EOM are normal.   Neck: Normal range of motion. Neck supple. No JVD  present.   Cardiovascular: Normal rate, regular rhythm, normal heart sounds and intact distal pulses.   No murmur heard.  Pulmonary/Chest: Effort normal and breath sounds normal. He has no wheezes.   Abdominal: Soft. Bowel sounds are normal. There is no tenderness.   Musculoskeletal: Normal range of motion. He exhibits no edema.   Neurological: He is alert and oriented to person, place, and time. He has normal strength. GCS eye subscore is 4. GCS verbal subscore is 5. GCS motor subscore is 6.   Skin: Skin is warm and dry.   Psychiatric: He has a normal mood and affect. His behavior is normal.   Nursing note and vitals reviewed.      Significant Labs:   A1C:   Recent Labs   Lab 12/16/19  1805   HGBA1C 6.9*     CBC:   Recent Labs   Lab 12/16/19  1543   WBC 5.84   HGB 13.3*   HCT 39.5*        CMP:   Recent Labs   Lab 12/16/19  1543      K 4.1      CO2 28   *   BUN 15   CREATININE 1.1   CALCIUM 9.7   PROT 7.8   ALBUMIN 4.1   BILITOT 0.4   ALKPHOS 63   AST 33   ALT 49*   ANIONGAP 9   EGFRNONAA >60     Lipid Panel:   Recent Labs   Lab 12/16/19  2219   CHOL 153   HDL 46   LDLCALC 62.0*   TRIG 225*   CHOLHDL 30.1     Troponin:   Recent Labs   Lab 12/16/19  1543   TROPONINI <0.006        All pertinent labs within the past 24 hours have been reviewed.    Significant Imaging: I have reviewed all pertinent imaging results/findings within the past 24 hours.     Imaging Results          MRA Neck with contrast (Final result)  Result time 12/16/19 20:02:52   Procedure changed from MRA Neck with and without contrast     Final result by Brian Licea MD (12/16/19 20:02:52)                 Impression:      The major arterial vascular structures of the neck demonstrate no evidence for high-grade stenosis or occlusion.      Electronically signed by: Brian Licea  Date:    12/16/2019  Time:    20:02             Narrative:    EXAMINATION:  MRA NECK WITH CONTRAST    CLINICAL  HISTORY:  numnbess;    TECHNIQUE:  MRA examination of the neck was performed, the patient was administered 10 mL Gadavist intravenous contrast.    COMPARISON:  None    FINDINGS:  The visualized aspects of the common, internal and external carotid arteries as well as the vertebral arteries demonstrate signal consistent with appropriate flow, there is no evidence for high-grade stenosis or occlusion, and no evidence for dissection of the aforementioned visualized arterial vascular structures of the neck.                               MRI Brain W WO Contrast (Final result)  Result time 12/16/19 19:57:50    Final result by Brian Licea MD (12/16/19 19:57:50)                 Impression:      Small focus of diffusion signal hyperintensity involving the right thalamus likely represents a small focal area of acute ischemia/infarct.  Additional chronic appearing intracranial changes are noted as well.      Electronically signed by: Brian Licea  Date:    12/16/2019  Time:    19:57             Narrative:    EXAMINATION:  MRI BRAIN W WO CONTRAST    CLINICAL HISTORY:  numbness;    TECHNIQUE:  MRI examination of brain was performed following the IV administration of 10 mL of Gadavist..    COMPARISON:  CT examination of the brain December 16, 2019    FINDINGS:  There is a small focus of diffusion signal hyperintensity noted involving the right thalamus, this is likely a small focal area of acute ischemia/infarct, there is no additional evidence for significant diffusion abnormality to specifically suggest additional areas of acute ischemia/infarct or other cause for restricted diffusion.    Chronic intracranial changes are noted, chronic appearing areas of T2 and FLAIR signal hyperintensity are noted, there is appearance of gliosis and encephalomalacia involving the left frontal lobe noted, this may relate to remote ischemia/infarct or other insult/injury.  There is no intracranial mass, mass effect or midline shift.   Appropriate CSF spaces are appropriate flow voids are noted at the skull base.  The intracranial venous sinuses appear appropriate.  After the administration of intravenous contrast a physiologic pattern of intracranial enhancement is noted.  The upper cervical cord, brainstem and craniocervical junction appear appropriate.  The visualized orbits appear intact.  Minimal paranasal sinus mucosal thickening is noted.  The mastoid air cells demonstrate appropriate signal void.                               CT Head Without Contrast (Final result)  Result time 12/16/19 15:56:36    Final result by Landon Oakes DO (12/16/19 15:56:36)                 Impression:      Mild moderate size regions of left inferior frontal and anterior temporal encephalomalacia concerning for sequela of prior contusions.  No evidence for acute intracranial hemorrhage.  Clinical correlation and further evaluation as warranted..      Electronically signed by: Landon Oakes DO  Date:    12/16/2019  Time:    15:56             Narrative:    EXAMINATION:  CT HEAD WITHOUT CONTRAST    CLINICAL HISTORY:  Stroke;    TECHNIQUE:  Multiple sequential 5 mm axial images of the head without contrast.  Coronal and sagittal reformatted imaging from the axial acquisition.    COMPARISON:  None    FINDINGS:  There is mild moderate regions of encephalomalacia of the left inferior frontal and anterior temporal lobes while nonspecific most suggestive for sequela of prior contusions clinical correlation advised.  There is no evidence for acute intracranial hemorrhage or sulcal effacement to suggest large territory recent infarction.

## 2019-12-17 NOTE — PT/OT/SLP EVAL
Speech Language Pathology Evaluation  Cognitive/Bedside Swallow    Patient Name:  Calderon Burt   MRN:  [id number]  Admitting Diagnosis: Acute thalamic infarction    Recommendations:                  General Recommendations:     1. Skilled dysphagia intervention for assessment of diet texture tolerance x1-2/times   2. Ongoing assessment of cognitive communication function    Diet recommendations:  Regular solids and thin liquids    Aspiration Precautions: HOB to 90 degrees and Standard aspiration precautions     General Precautions: Standard, fall    Communication strategies:  none    History:     Past Medical History:   Diagnosis Date    Cataract     Diabetes mellitus     Glaucoma     High cholesterol     Hypertension     Tendonitis        Past Surgical History:   Procedure Laterality Date    CARPAL TUNNEL RELEASE      right/left    CATARACT EXTRACTION, BILATERAL      HEMORRHOID SURGERY      SHOULDER OPEN ROTATOR CUFF REPAIR      left    WRIST SURGERY      right/left     Patient is a 69 y.o. male with PMH HTN, HLD, new diagnosis of DM presented to ED with c/o intermittent tingling/numbness to the left side of his tongue, face, left arm, and left leg for over a month. He has had tingling to his tongue and face for the past few days. Reported he has been seen by his PCP for this complaint in the past and recently had a carotid US that was reportedly unremarkable. Reported he felt weak on the left side making it difficult to ambulate and came to the ED. Denied any difficulty speaking or swallowing.     Social History: Patient lives alone and reports prior level of function is independent with ADLs. Pt reports that he is retired and recently . Has support from family (children) and friends.    Prior Intubation HX:  denies    Modified Barium Swallow: none report and none found in chart review    Chest X-Rays: none completed on this admit    CTA head: Bilateral intracranial ICAs, ACAs, MCAs, and PCAs are  patent without flow-limiting stenosis or aneurysm.  Scattered atherosclerosis of bilateral carotid siphons.  The basilar artery is normal.    MRA Neck: The major arterial vascular structures of the neck demonstrate no evidence for high-grade stenosis or occlusion.    MRI brain without contrast: Small focus of diffusion signal hyperintensity involving the right thalamus likely represents a small focal area of acute ischemia/infarct.  Additional chronic appearing intracranial changes are noted as well.    Prior diet: regular solids and thin liquids no history of dysphagia reported.    Occupation/hobbies/homemaking: retired.    Subjective   Pt presents at bedside alert, RN present. Pt is agreeable to assessments by SLP. Pt reports that he was having tingling feelings on left side of face and tongue. No longer present and sensory function appears to be resolved on left side of face and tongue.     Patient goals: I want to know whats wrong     Pain/Comfort:  · Pain Rating 1: 0/10  · Pain Rating Post-Intervention 1: 0/10    Objective:     Cognitive Status:    Arousal/Alertness Appropriate response to stimuli  Attention No obvious deficits observed able to sustain attention to tasks for entirety of session 30 min  Orientation Oriented x4  Memory Immediate Recall 100% accy able to recall 5 digits forward and 4 digits backword consistently and Delayed2/3 items after 5 minutes  Problem Solving Categories category exclusion 90% accy, Sequencing able to sequence ADLs without difficulty or prompting and Compare/contrast 100% accy   Safety awareness demonstrates good safety awareness and hospital safety  Simple calculation related to med management 100%, simple addition, subtraction and multiplication 100% accy  Reasoning Cause/effect comment 100% accy      Receptive Language:   Comprehension:      Questions following paragraph 4/5 without cues  Simple yes/no 100% accy  Complex yes/no 100% accy  Commands  multistep basic commands  100% accy   Object identifications 5 in Fo 5  Picture identification 5 in Fo 5    Pragmatics:    initiation adequate initiation of conversation and questions related to circumstance, prior function and POC, turn taking appropriate, topic maintenance able to maintain topic appropriately  and Eye contact appropriate    Expressive Language:  Verbal:    Automatic Speech  Counting 1-10;10-1  and Days of the week 100  Initiation appropriate  Repetition Words 100% accy, Phrases 100% accy and Sentences 100% accy  Naming Confrontation 100% acct and Single word responsive naming 100% accy  Conversational speech appropriate, able to communicate complex ideas        Motor Speech:  WFL  Articulation no deficits in articulation, speech is intellgible    Voice:   WFL      Reading:   Word 100% accy and timely and Sentence 100% accy and timely         Oral Musculature Evaluation  · Oral Musculature: WFL  · Dentition: present and adequate  · Secretion Management: adequate  · Mucosal Quality: adequate  · Mandibular Strength and Mobility: WFL  · Lingual Strength and Mobility: impaired strength  · Velar Elevation: WFL  · Buccal Strength and Mobility: impaired  · Volitional Cough: adequate   · Volitional Swallow: adequate  · Voice Prior to PO Intake: clear, adequate intensity  · Oral Musculature Comments: facial asymmetry on left with flattening, decreased strength on left side. Lingual protrusion to midline.      Bedside Swallow Eval:   Consistencies Assessed:  · Thin liquids consecutive sips from straw and cup 6 ounces total  · Puree 4 ounces of pudding  · Soft solids 3 bites   · Solids 1 karson cracker     Oral Phase:   · WFL   · Adequate labial seal, no anterior loss with self administered trials of solids or liquids (cup/straw)  · Timely bolus prep  · Adequate bolus cohesion and manipulation  · Timely oral transit  · Minimal oral residuals with karson cracker independently cleared with lingual sweep without  prompts/cues      Pharyngeal Phase:   · no overt clinical signs/symptoms of aspiration  · no overt clinical signs/symptoms of pharyngeal dysphagia   · No changes in respiratory effort or vocal quality    Compensatory Strategies  · None      Education:  SLP provided education to patient regarding diet recommendations, overt s/s of aspiration, general aspiration precautions including small bites and sips, alternating liquids and solids, sitting upright for all intake, remaining upright after meals.   SLP reviewed s/s of stroke. Pt verbalized understanding and was able to recall s/s of stroke.   Assessment:     Calderon Burt is a 69 y.o. male s/p Acute thalamic infarction with an SLP diagnosis of mild oral dysphagia impacted by lingual, labial strength and buccal function on left side. Sensation is intact. Pt presents with mild facial asymmetry on left side. Pt is not identified as high risk for aspiration. SLP reviewed standard aspiration precautions with patient. Pt presents with mild deficits in cognitive communication function characterized by dcr delayed recall and dcr comprehension of information at paragraph level. Skilled speech intervention indicated for diet texture tolerance x1-2 times and ongoing cognitive linguistic assessment and intervention.     Goals:   Description  1. Pt will be able to consume regular solids and thin liquids independently without overt s/s of airway threat or aspiration.   2. Pt will be able to answer 5/5 questions following short paragraph read aloud by therapist with 100% accy without cues or prompts  3. Pt will be able to recall 3 items after 5 minutes without category or multiple choice cues    Outcome: Ongoing, Progressing      Plan:     · Patient to be seen:  2 x/week, 3 x/week   · Plan of Care expires:  12/24/19  · Plan of Care reviewed with:  patient   · SLP Follow-Up:  Yes       Discharge recommendations:      Barriers to Discharge:  None    Time Tracking:     SLP  Treatment Date:   12/17/19  Speech Start Time:  1147  Speech Stop Time:  1221     Speech Total Time (min):  34 min    Billable Minutes: Eval 18 min  and Eval Swallow and Oral Function 16 min    Sabrina Jimenez CCC-SLP  12/17/2019

## 2019-12-17 NOTE — NURSING
AOx4. No acute distress noted. Patient denies presence of numbness/tingling to any extremity, N/V/D, or SOB. Vitals stable *refer to flow sheet for data*. Cardiac monitor in place. Ambulation promoted. Bed locked/lowest position, side rails up x 2, nonskid socks when out of bed, call light within reach. Patient encouraged to call for assistance when needed. Will continue to monitor.

## 2019-12-17 NOTE — PLAN OF CARE
A&Ox4 - neuro check q 4 hours maintained - seen by neurologist this afternoon - pt complained of numbness on his tongue at 1538 this afternoon - states it resolved - vitals were stable and DIAMOND Ngo was notified - pt has been ambulatory and voiding adequately - blood glucose monitoring maintained - no other complaints at this time - family at bedside - report called to [address] [place] [address] - given to anatoly saeed - will continue to monitor pt

## 2019-12-17 NOTE — H&P
[facility]  General Neurology Consultation    Reason for consult:  Stroke  Reason for admission:  TIA (transient ischemic attack) [G45.9]  Chest pressure [R07.89]  Length of Stay:  1    History of present illness:   70 y/o male with PMH HTN, HLD, new diagnosis of DM presented to ED with c/o intermittent tingling/numbness to the left side of his tongue, face, left arm, and left leg for over a month.  Episodes are intermittent and stereotyped left face and tongue numbness. He has discussed this issue with PCP previously and persisted.  He was adherent to to Aspirin 325 and statin prior to episode.     Per initial HPI:  He has had tingling to his tongue and face for the past few days. Reported he has been seen by his PCP for this complaint in the past and recently had a carotid US that was reportedly unremarkable. Reported when he was diagnosed with DM recently he went to an all vegetable diet and lost about 10 pounds. His significant other was worried about his fast weight loss and recently made him add meat back to his diet. He stated he tries to keep in shape and exercises frequently. Reported today he felt weak on the left side making it difficult to ambulate and came to the ED. Denied any difficulty speaking or swallowing. Denies HA, dizziness, lightheadedness, CP, SOB, N/V/D, urinary symptoms. Nonsmoker, denied alcohol or illicit drug use. No new medications. ED evaluation labs unremarkable, CT Head Mild moderate size regions of left inferior frontal and anterior temporal encephalomalacia concerning for sequela of prior contusions.  No evidence for acute intracranial hemorrhage. Placed in Observation        Review of systems:   CONSTITUTIONAL: No weight loss, fever, chills, weakness or fatigue.   HEENT: Eyes: No visual loss, blurred vision, double vision or yellow sclerae. Ears, Nose, Throat: No hearing loss, sneezing, congestion, runny nose or sore throat.   SKIN: No rash or itching.    CARDIOVASCULAR: No chest pain, chest pressure or chest discomfort. No palpitations or edema.   RESPIRATORY: No shortness of breath, cough or sputum.   GASTROINTESTINAL: No anorexia, nausea, vomiting or diarrhea. No abdominal pain or blood.   GENITOURINARY: No dysuria, frequency or retention.   NEUROLOGICAL: As in HPI   MUSCULOSKELETAL: No muscle, back pain, joint pain or stiffness.   HEMATOLOGIC: No anemia, bleeding or bruising.   LYMPHATICS: No enlarged nodes.  PSYCHIATRIC: No history of depression or anxiety.   ENDOCRINOLOGIC: No reports of sweating, cold or heat intolerance. No polyuria or polydipsia.     Past Medical History:   Diagnosis Date    Cataract     Diabetes mellitus     Glaucoma     High cholesterol     Hypertension     Tendonitis        Past Surgical History:   Procedure Laterality Date    CARPAL TUNNEL RELEASE      right/left    CATARACT EXTRACTION, BILATERAL      HEMORRHOID SURGERY      SHOULDER OPEN ROTATOR CUFF REPAIR      left    WRIST SURGERY      right/left       Family History   Problem Relation Age of Onset    Diabetes Mother     Alcohol abuse Mother     Hypertension Mother     Stroke Father     Alcohol abuse Father     Hypertension Father     Diabetes Sister     Hypertension Sister     Diabetes Brother     Hypertension Brother     Diabetes Daughter     Diabetes Son     Vision loss Son     Stroke Son        Social History     Socioeconomic History    Marital status:      Spouse name: Not on file    Number of children: Not on file    Years of education: Not on file    Highest education level: Not on file   Occupational History    Not on file   Social Needs    Financial resource strain: Not on file    Food insecurity:     Worry: Not on file     Inability: Not on file    Transportation needs:     Medical: Not on file     Non-medical: Not on file   Tobacco Use    Smoking status: Former Smoker    Smokeless tobacco: Never Used   Substance and Sexual  Activity    Alcohol use: No    Drug use: No    Sexual activity: Yes   Lifestyle    Physical activity:     Days per week: Not on file     Minutes per session: Not on file    Stress: Not on file   Relationships    Social connections:     Talks on phone: Not on file     Gets together: Not on file     Attends Buddhist service: Not on file     Active member of club or organization: Not on file     Attends meetings of clubs or organizations: Not on file     Relationship status: Not on file   Other Topics Concern    Not on file   Social History Narrative    Not on file       Scheduled Meds:   aspirin  81 mg Oral Daily    atorvastatin  40 mg Oral Daily    bimatoprost  1 drop Both Eyes QHS    clopidogrel  75 mg Oral Daily    enoxaparin  40 mg Subcutaneous Daily    tamsulosin  0.4 mg Oral Daily     Continuous Infusions:   sodium chloride 0.9%       PRN Meds:.acetaminophen, dextrose 50%, dextrose 50%, glucagon (human recombinant), glucose, glucose, influenza, insulin aspart U-100, labetalol, ondansetron, pneumoc 13-nitza conj-dip cr(PF), sodium chloride 0.9%, sodium chloride 0.9%    Review of patient's allergies indicates:  No Known Allergies      Physical Exam    Vitals:    12/17/19 0600 12/17/19 0700 12/17/19 0738 12/17/19 0800   BP:   136/78    BP Location:   Left arm    Patient Position:   Lying    Pulse: (!) 58 64 65 61   Resp:   14    Temp:   97.6 °F (36.4 °C)    TempSrc:   Oral    SpO2:   99%    Weight:       Height:           NIH Stroke Scale        Time: 4:25 PM  Person Administering Scale: Yeimy Marcano    Administer stroke scale items in the order listed. Record performance in each category after each subscale exam. Do not go back and change scores. Follow directions provided for each exam technique. Scores should reflect what the patient does, not what the clinician thinks the patient can do. The clinician should record answers while administering the exam and work quickly. Except where indicated, the  patient should not be coached (i.e., repeated requests to patient to make a special effort).      1a  Level of consciousness: 0=alert; keenly responsive   1b. LOC questions:  0=Answers both tasks correctly   1c. LOC commands: 0=Answers both tasks correctly   2.  Best Gaze: 0=normal   3.  Visual: 0=No visual loss   4. Facial Palsy: 1=Minor paralysis (flattened nasolabial fold, asymmetric on smiling)   5a.  Motor left arm: 0=No drift, limb holds 90 (or 45) degrees for full 10 seconds   5b.  Motor right arm: 0=No drift, limb holds 90 (or 45) degrees for full 10 seconds   6a. motor left le=No drift, limb holds 90 (or 45) degrees for full 10 seconds   6b  Motor right le=No drift, limb holds 90 (or 45) degrees for full 10 seconds   7. Limb Ataxia: 0=Absent   8.  Sensory: 1=Mild to moderate sensory loss; patient feels pinprick is less sharp or is dull on the affected side; there is a loss of superficial pain with pinprick but patient is aware He is being touched   9. Best Language:  0=No aphasia, normal   10. Dysarthria: 0=Normal   11. Extinction and Inattention: 0=No abnormality   12. Distal motor function: 0=Normal    Total:   2       In general, the patient is well nourished.    No bruits. Fundi are normal bilaterally.    MENTAL STATUS: language is fluent, normal verbal comprehension, short-term and remote memory is intact, attention is normal, patient is alert and oriented x 3, fund of knowlege is appropriate by vocabulary.     CRANIAL NERVE EXAM:  Extraocular muscles are intact. Pupils are equal, round, and reactive to light. Mild L facial asymmetry. Facial sensation diminished to LT on L. There is no dysarthria. Uvula is midline, and palate moves symmetrically. Shoulder shrug intact bilaterlly. Tongue protrusion is midline. Hearing is grossly intact. Neck is supple.     MOTOR EXAM: Normal bulk and tone throughout UE and LE bilaterally.   No pronator drift; rapid sequential movements are normal; Strength is   5/5 in all groups in the lower extremities and upper extremities.    REFLEXES: 2+ and symmetric throughout in all four extremeties; toes are down going bilaterally    SENSORY EXAM: diminished to LT in LUE  COORDINATION: Finger to Nose examination intact, Heel to shin examination intact.    GAIT: Narrow based and stable    IMAGING (personally reviewed):  Results for orders placed or performed during the hospital encounter of 12/16/19   MRI Brain W WO Contrast    Narrative    EXAMINATION:  MRI BRAIN W WO CONTRAST    CLINICAL HISTORY:  numbness;    TECHNIQUE:  MRI examination of brain was performed following the IV administration of 10 mL of Gadavist..    COMPARISON:  CT examination of the brain December 16, 2019    FINDINGS:  There is a small focus of diffusion signal hyperintensity noted involving the right thalamus, this is likely a small focal area of acute ischemia/infarct, there is no additional evidence for significant diffusion abnormality to specifically suggest additional areas of acute ischemia/infarct or other cause for restricted diffusion.    Chronic intracranial changes are noted, chronic appearing areas of T2 and FLAIR signal hyperintensity are noted, there is appearance of gliosis and encephalomalacia involving the left frontal lobe noted, this may relate to remote ischemia/infarct or other insult/injury.  There is no intracranial mass, mass effect or midline shift.  Appropriate CSF spaces are appropriate flow voids are noted at the skull base.  The intracranial venous sinuses appear appropriate.  After the administration of intravenous contrast a physiologic pattern of intracranial enhancement is noted.  The upper cervical cord, brainstem and craniocervical junction appear appropriate.  The visualized orbits appear intact.  Minimal paranasal sinus mucosal thickening is noted.  The mastoid air cells demonstrate appropriate signal void.      Impression    Small focus of diffusion signal hyperintensity  involving the right thalamus likely represents a small focal area of acute ischemia/infarct.  Additional chronic appearing intracranial changes are noted as well.      Electronically signed by: Brian Licea  Date:    12/16/2019  Time:    19:57   CT Head Without Contrast    Narrative    EXAMINATION:  CT HEAD WITHOUT CONTRAST    CLINICAL HISTORY:  Stroke;    TECHNIQUE:  Multiple sequential 5 mm axial images of the head without contrast.  Coronal and sagittal reformatted imaging from the axial acquisition.    COMPARISON:  None    FINDINGS:  There is mild moderate regions of encephalomalacia of the left inferior frontal and anterior temporal lobes while nonspecific most suggestive for sequela of prior contusions clinical correlation advised.  There is no evidence for acute intracranial hemorrhage or sulcal effacement to suggest large territory recent infarction.      Impression    Mild moderate size regions of left inferior frontal and anterior temporal encephalomalacia concerning for sequela of prior contusions.  No evidence for acute intracranial hemorrhage.  Clinical correlation and further evaluation as warranted..      Electronically signed by: Landon Oakes DO  Date:    12/16/2019  Time:    15:56         LABS:  Lab Results   Component Value Date    WBC 5.84 12/16/2019    HGB 13.3 (L) 12/16/2019    HCT 39.5 (L) 12/16/2019    MCV 90 12/16/2019     12/16/2019   CMP  Sodium   Date Value Ref Range Status   12/16/2019 140 136 - 145 mmol/L Final     Potassium   Date Value Ref Range Status   12/16/2019 4.1 3.5 - 5.1 mmol/L Final     Chloride   Date Value Ref Range Status   12/16/2019 103 95 - 110 mmol/L Final     CO2   Date Value Ref Range Status   12/16/2019 28 23 - 29 mmol/L Final     Glucose   Date Value Ref Range Status   12/16/2019 118 (H) 70 - 110 mg/dL Final     BUN, Bld   Date Value Ref Range Status   12/16/2019 15 8 - 23 mg/dL Final     Creatinine   Date Value Ref Range Status   12/16/2019 1.1 0.5 - 1.4  mg/dL Final     Calcium   Date Value Ref Range Status   12/16/2019 9.7 8.7 - 10.5 mg/dL Final     Total Protein   Date Value Ref Range Status   12/16/2019 7.8 6.0 - 8.4 g/dL Final     Albumin   Date Value Ref Range Status   12/16/2019 4.1 3.5 - 5.2 g/dL Final     Total Bilirubin   Date Value Ref Range Status   12/16/2019 0.4 0.1 - 1.0 mg/dL Final     Comment:     For infants and newborns, interpretation of results should be based  on gestational age, weight and in agreement with clinical  observations.  Premature Infant recommended reference ranges:  Up to 24 hours.............<8.0 mg/dL  Up to 48 hours............<12.0 mg/dL  3-5 days..................<15.0 mg/dL  6-29 days.................<15.0 mg/dL       Alkaline Phosphatase   Date Value Ref Range Status   12/16/2019 63 55 - 135 U/L Final     AST   Date Value Ref Range Status   12/16/2019 33 10 - 40 U/L Final     ALT   Date Value Ref Range Status   12/16/2019 49 (H) 10 - 44 U/L Final     Anion Gap   Date Value Ref Range Status   12/16/2019 9 8 - 16 mmol/L Final     eGFR if    Date Value Ref Range Status   12/16/2019 >60 >60 mL/min/1.73 m^2 Final     eGFR if non    Date Value Ref Range Status   12/16/2019 >60 >60 mL/min/1.73 m^2 Final     Comment:     Calculation used to obtain the estimated glomerular filtration  rate (eGFR) is the CKD-EPI equation.        Results for HAL READ (MRN [id number]) as of 12/17/2019 09:25   Ref. Range 12/16/2019 22:19   Cholesterol Latest Ref Range: 120 - 199 mg/dL 153   HDL Latest Ref Range: 40 - 75 mg/dL 46   Hdl/Cholesterol Ratio Latest Ref Range: 20.0 - 50.0 % 30.1   LDL Cholesterol External Latest Ref Range: 63.0 - 159.0 mg/dL 62.0 (L)   Non-HDL Cholesterol Latest Units: mg/dL 107   Total Cholesterol/HDL Ratio Latest Ref Range: 2.0 - 5.0  3.3   Triglycerides Latest Ref Range: 30 - 150 mg/dL 225 (H)            ASSESSMENT:        [unfilled]    1.        2.        3.        4.              DISCUSSION:     68 y/o male with PMH HTN, HLD, new diagnosis of DM presented to ED with c/o intermittent tingling/numbness to the left side of his tongue, face, left arm, and left leg for over a month.  History is notable for intermittent tongue and face numbness.  Exam is notable for NIHSS 2 with decreased LT in L face and arm and mild L naso-labial flattening and o/w non-focal neurological exam.   Workup is notable CTH w/ left inferior frontal and anterior temporal encephalomalacia, MRI with concern for acute R thalamic CVA and MRA with no remarkable high-grade stenosis, elevated TG's, elevated HgA1C (6.9), WNL TSH, unremarkable angiography.  Pt's presentation is c/w R thalamic CVA with sub-optimal stroke prevention regiment.   Evidence of previous brain injury as well.        RECOMMENDATIONS:  -f/u ECHO  - Plan for secondary prevention of stroke:  (a) Antiplatelet medication: Aspirin 81mg daily and add plavix. (b)Cholesterol medication: atorvastatin 40mg daily. (c) anti-hypertensive medications    -goal LDL-C between 70 mg/dL (1.81 mmol/L) and 189 mg/dL (4.90 mmol/L)    -goal blood pressure is usually <140 systolic as well as <90 mmHg   -educated about healthy diet: low fat, avoid saturated fats, high in vegetables and fruits  -other life-style modifications:  weight reduction, especially in overweight/obese patients, salt restriction, and avoidance of excess alcohol intake  -brain health    Thank you for the consult.  Recommendations communicated to NP Jeni Marcano MD  Neurology Consulting Physician

## 2019-12-17 NOTE — PLAN OF CARE
Problem: Occupational Therapy Goal  Goal: Occupational Therapy Goal  Outcome: Met     OT evaluation complete with no deficits identified with ADLs, cognition, or functional mobility.  PTA pt reports being (I) with all ADLs, IADLs, and mobility.  Pt is performing at baseline and does not require OT services in the acute care setting.  Pt to d/c from OT with no further therapy needs recommended upon d/c from hospital.

## 2019-12-17 NOTE — UM SECONDARY REVIEW
Emily np wanted case inp[t - sent to ehr - 12-17 dx stroke - emily upgrade case to inpt dx stroke medicare - appro inpt per ehr md meghan narayan 12-17

## 2019-12-17 NOTE — ASSESSMENT & PLAN NOTE
- hemoglobin A1c 6.9 with morning labs  - home regimen metformin 1000 mg twice daily  - low dose sliding scale for now and will adjust as needed  - diabetic diet with accuchecks  - monitor

## 2019-12-17 NOTE — ASSESSMENT & PLAN NOTE
- reasonably stable  - hold home blood pressure medications for permissive hypertension  - will monitor during stroke work up --SBP < 220 and > 130

## 2019-12-17 NOTE — PT/OT/SLP EVAL
Physical Therapy Evaluation and Discharge Note    Patient Name:  Calderon Burt   MRN:  [id number]    Recommendations:     Discharge Recommendations:  home   Discharge Equipment Recommendations: none   Barriers to discharge: None    Assessment:     Calderon Burt is a 69 y.o. male admitted with a medical diagnosis of Acute thalamic infarction. PT orders received. Evaluation completed. Pt is independent for ambulation and transfers. Pt demonstrates normal gait pattern and normal balance. Pt expressed confidence in all aspects of his mobility.  At this time, patient is functioning at their prior level of function and does not require further acute PT services. Recommend discharge to home.    Recent Surgery: * No surgery found *      Plan:     During this hospitalization, patient does not require further acute PT services.  Please re-consult if situation changes.      Subjective     Chief Complaint: None stated  Patient/Family Comments/goals: none stated  Pain/Comfort:  · Pain Rating 1: 0/10  · Pain Rating Post-Intervention 1: 0/10    Patients cultural, spiritual, Yarsani conflicts given the current situation: no    Living Environment:  Pt lives alone in a single story house with 5 steps and bilateral handrails to enter. Pt reports handrails are wide apart and he can only reach on at a time. Pt has a tub shower.  Prior to admission, patients level of function was independent for all mobility and ADLs. Pt enjoys riding motorcycles and reports exercising daily.  Equipment used at home: none.  DME owned (not currently used): none.  Upon discharge, patient will not have assistance at home.    Objective:     Communicated with RN (Suzie) prior to session.  Patient found supine with peripheral IV, telemetry upon PT entry to room.    General Precautions: Standard,     Orthopedic Precautions:N/A   Braces: N/A     Exams:  · Cognition:   · Patient is oriented to person, place, time, and situation.  · Pt follows approximately  100% of multiple-step commands.    · Mood: Pleasant and cooperative.  · Musculoskeletal:  · Posture:    · In sitting: WNL  · In standing: WNL  · LE ROM/Strength:   · R ROM: No deficits  · L ROM: No deficits  · R Strength:   · Hip flexion: 5/5  · Knee extension: 5/5  · Dorsiflexion: 5/5   · L Strength:   · Hip flexion: 5/5  · Knee extension: 5/5  · Dorsiflexion: 5/5   · Neuromuscular:  · Sensation: Intact to light touch bilateral LEs.   · Tone/Reflexes: No impairments identified with functional mobility. No formal testing performed.  · Coordination:  · Heel to shin: WNL  · Toe tapping: WNL  · Balance:   · Static sitting: Independent  · Dynamic sitting: Independent  · Static standing: Independent  · Dynamic standing: Independent  · Visual-vestibular: No impairments identified with functional mobility. No formal testing performed.  · Integument: Visible skin intact     Functional Mobility:  · Bed Mobility:     · Supine to Sit: independent  · Sit to Supine: independent  · Transfers:     · Sit to Stand:  independent with no AD  · Gait: 200 ft independently with no AD.  · Demonstrated normal gait pattern. No loss of balance.    AM-PAC 6 CLICK MOBILITY  Total Score:24     Patient left supine with all lines intact and call button in reach.    GOALS:   Multidisciplinary Problems     Physical Therapy Goals     Not on file          Multidisciplinary Problems (Resolved)        Problem: Physical Therapy Goal    Goal Priority Disciplines Outcome Goal Variances Interventions   Physical Therapy Goal   (Resolved)     PT, PT/OT Met                     History:     Past Medical History:   Diagnosis Date    Cataract     Diabetes mellitus     Glaucoma     High cholesterol     Hypertension     Tendonitis        Past Surgical History:   Procedure Laterality Date    CARPAL TUNNEL RELEASE      right/left    CATARACT EXTRACTION, BILATERAL      HEMORRHOID SURGERY      SHOULDER OPEN ROTATOR CUFF REPAIR      left    WRIST SURGERY       right/left       Time Tracking:     PT Received On: 12/17/19  PT Start Time: 0851     PT Stop Time: 0906  PT Total Time (min): 15 min   *Co-eval with OT    Billable Minutes: Evaluation 15      Stacy Yuen, PT  12/17/2019

## 2019-12-17 NOTE — H&P
[facility] Medicine  History & Physical    Patient Name: Calderon Burt  MRN: [id number]  Admission Date: 12/16/2019  Attending Physician: Latonia Patel MD   Primary Care Provider: William Ruiz MD         Patient information was obtained from patient, spouse/SO, past medical records and ER records.     Subjective:     Principal Problem:Numbness and tingling of left upper and lower extremity    Chief Complaint:   Chief Complaint   Patient presents with    Fatigue     Pt reports intermittent tingling to tongue, teeth, left arm and left leg for the past thirty minutes that has now subsided. Pt states he has seen Dr. Durant in the past for the same s/s. He states he had an US adán carotid on 12/5 for the same s/s. Family states he did have slurred speech while on the phone but pts states he was upset.     Numbness        HPI: 68 y/o male with PMH HTN, HLD, new diagnosis of DM presented to ED with c/o intermittent tingling/numbness to the left side of his tongue, face, left arm, and left leg for over a month. He has had tingling to his tongue and face for the past few days. Reported he has been seen by his PCP for this complaint in the past and recently had a carotid US that was reportedly unremarkable. Reported when he was diagnosed with DM recently he went to an all vegetable diet and lost about 10 pounds. His significant other was worried about his fast weight loss and recently made him add meat back to his diet. He stated he tries to keep in shape and exercises frequently. Reported today he felt weak on the left side making it difficult to ambulate and came to the ED. Denied any difficulty speaking or swallowing. Denies HA, dizziness, lightheadedness, CP, SOB, N/V/D, urinary symptoms. Nonsmoker, denied alcohol or illicit drug use. No new medications. ED evaluation labs unremarkable, CT Head Mild moderate size regions of left inferior frontal and anterior temporal encephalomalacia  concerning for sequela of prior contusions.  No evidence for acute intracranial hemorrhage. Placed in Observation.     Past Medical History:   Diagnosis Date    Diabetes mellitus     Hypertension        Past Surgical History:   Procedure Laterality Date    CARPAL TUNNEL RELEASE      right/left    SHOULDER OPEN ROTATOR CUFF REPAIR      left    WRIST SURGERY      right/left       Review of patient's allergies indicates:  No Known Allergies    No current facility-administered medications on file prior to encounter.      Current Outpatient Medications on File Prior to Encounter   Medication Sig    aspirin 325 MG tablet Take 325 mg by mouth once daily.    atorvastatin (LIPITOR) 20 MG tablet Take 20 mg by mouth once daily.    metFORMIN (GLUMETZA) 1000 MG (MOD) 24 hr tablet Take 1,000 mg by mouth 2 (two) times daily with meals.    naproxen (NAPROSYN) 500 MG tablet Take 1 tablet (500 mg total) by mouth 2 (two) times daily with meals.     Family History     Problem Relation (Age of Onset)    Diabetes Mother    Stroke Father        Tobacco Use    Smoking status: Never Smoker    Smokeless tobacco: Never Used   Substance and Sexual Activity    Alcohol use: No    Drug use: No    Sexual activity: Yes     Review of Systems   Constitutional: Negative for chills, fatigue and fever.   HENT: Negative for congestion and rhinorrhea.    Eyes: Negative.    Respiratory: Negative for cough and shortness of breath.    Cardiovascular: Negative for chest pain and palpitations.   Gastrointestinal: Negative for abdominal distention, diarrhea, nausea and vomiting.   Endocrine: Negative for polydipsia and polyuria.   Genitourinary: Negative for difficulty urinating.   Musculoskeletal: Negative for back pain and myalgias.   Skin: Negative.    Neurological: Positive for weakness and numbness. Negative for dizziness, speech difficulty and headaches.   Psychiatric/Behavioral: Negative for confusion and decreased concentration.      Objective:     Vital Signs (Most Recent):  Temp: 98.2 °F (36.8 °C) (12/16/19 1516)  Pulse: 77 (12/16/19 1732)  Resp: (!) 30 (12/16/19 1732)  BP: 127/67 (12/16/19 1732)  SpO2: 98 % (12/16/19 1732) Vital Signs (24h Range):  Temp:  [98.2 °F (36.8 °C)] 98.2 °F (36.8 °C)  Pulse:  [77-97] 77  Resp:  [18-30] 30  SpO2:  [96 %-98 %] 98 %  BP: (127-160)/(67-86) 127/67     Weight: 86.2 kg (190 lb)  Body mass index is 25.77 kg/m².    Physical Exam   Constitutional: He is oriented to person, place, and time. He appears well-developed and well-nourished. No distress.   HENT:   Head: Normocephalic and atraumatic.   Mouth/Throat: Oropharynx is clear and moist.   Eyes: Pupils are equal, round, and reactive to light. Conjunctivae and EOM are normal.   Neck: Normal range of motion. Neck supple.   Cardiovascular: Normal rate, regular rhythm, normal heart sounds and intact distal pulses.   No murmur heard.  Pulmonary/Chest: Effort normal and breath sounds normal. No respiratory distress. He has no wheezes.   Abdominal: Soft. Bowel sounds are normal. He exhibits no distension. There is no tenderness. There is no guarding.   Musculoskeletal: Normal range of motion. He exhibits no edema.   Neurological: He is alert and oriented to person, place, and time. He has normal strength. No cranial nerve deficit or sensory deficit. He exhibits normal muscle tone. GCS eye subscore is 4. GCS verbal subscore is 5. GCS motor subscore is 6.   Finger to nose, heel to shin normal   Skin: He is not diaphoretic.   Nursing note and vitals reviewed.        CRANIAL NERVES     CN III, IV, VI   Pupils are equal, round, and reactive to light.  Extraocular motions are normal.        Significant Labs:   CBC:   Recent Labs   Lab 12/16/19  1543   WBC 5.84   HGB 13.3*   HCT 39.5*        CMP:   Recent Labs   Lab 12/16/19  1543      K 4.1      CO2 28   *   BUN 15   CREATININE 1.1   CALCIUM 9.7   PROT 7.8   ALBUMIN 4.1   BILITOT 0.4    ALKPHOS 63   AST 33   ALT 49*   ANIONGAP 9   EGFRNONAA >60     All pertinent labs within the past 24 hours have been reviewed.    Significant Imaging: I have reviewed all pertinent imaging results/findings within the past 24 hours.   Imaging Results          MRI Brain W WO Contrast (In process)                MRA Neck with and without contrast (In process)                CT Head Without Contrast (Final result)  Result time 12/16/19 15:56:36    Final result by Landon Oakes DO (12/16/19 15:56:36)                 Impression:      Mild moderate size regions of left inferior frontal and anterior temporal encephalomalacia concerning for sequela of prior contusions.  No evidence for acute intracranial hemorrhage.  Clinical correlation and further evaluation as warranted..      Electronically signed by: Landon Oakes DO  Date:    12/16/2019  Time:    15:56             Narrative:    EXAMINATION:  CT HEAD WITHOUT CONTRAST    CLINICAL HISTORY:  Stroke;    TECHNIQUE:  Multiple sequential 5 mm axial images of the head without contrast.  Coronal and sagittal reformatted imaging from the axial acquisition.    COMPARISON:  None    FINDINGS:  There is mild moderate regions of encephalomalacia of the left inferior frontal and anterior temporal lobes while nonspecific most suggestive for sequela of prior contusions clinical correlation advised.  There is no evidence for acute intracranial hemorrhage or sulcal effacement to suggest large territory recent infarction.                                  Assessment/Plan:     * Numbness and tingling of left upper and lower extremity  - intermittent for the last month, worsened today with left LE weakness  - CT head Mild moderate size regions of left inferior frontal and anterior temporal encephalomalacia concerning for sequela of prior contusions.  No evidence for acute intracranial hemorrhage  - MRI/MRa brain, neck  - check B12, TSH, A1c, Lipid  - PT/OT      HLD (hyperlipidemia)  -  knows medication, does not know dose  - will resume when meds known      Type 2 diabetes mellitus, without long-term current use of insulin  - check A1c  - SSI/accuchecks  - monitor      Essential hypertension  - reasonably stable  - does not know home meds, family member to bring in      VTE Risk Mitigation (From admission, onward)         Ordered     Place sequential compression device  Until discontinued      12/16/19 1633     IP VTE LOW RISK PATIENT  Once      12/16/19 1633                   Deborah Rae PA-C  Department of Hospital Medicine   [facility]

## 2019-12-17 NOTE — ASSESSMENT & PLAN NOTE
- acute onset of left sided numbness/tingling on 12/16/2019 lasting approximately thirty minutes  - CT head Mild moderate size regions of left inferior frontal and anterior temporal encephalomalacia concerning for sequela of prior contusions.  No evidence for acute intracranial hemorrhage  - MRI brain with evidence of acute right thalamic infart with chronic gliosis and encephalomalacia left frontal lobe representing remote ischemia or prior trauma  - MRA neck without evidence for stenosis or occlusion; CTA brain pending  - Neurology consulted  - stroke work up with aspirin 81 mg, plavix 75 mg daily and atorvastatin 40 mg for secondary stroke prevention  - aggressive risk factor management: type 2 DM reasonable controle with A1C 6.9, hypertriglyceridemia noted on lipid panel continue statin, allow for permissive hypertension for now SBP < 220 and > 130;  on lifestyle modifications  - dietician consult pending for assistance with dietary choices  - echocardiogram pending  - PT/OT/SLP evaluations pending  - ongoing stroke edcuation and when to call 911

## 2019-12-17 NOTE — ASSESSMENT & PLAN NOTE
- left upper, lower extremity numbness worsened yesterday, but has had similar symptoms recently and evaluated with carotid doppler US on December 5, 2019 per his PCP  - now with acute right thalamic infarct on MRI brain  - continue stroke work up  - PT/OT/SLP and Neurology consulted  - fall precaution

## 2019-12-17 NOTE — ASSESSMENT & PLAN NOTE
- intermittent for the last month, worsened today with left LE weakness  - CT head Mild moderate size regions of left inferior frontal and anterior temporal encephalomalacia concerning for sequela of prior contusions.  No evidence for acute intracranial hemorrhage  - MRI/MRa brain, neck  - check B12, TSH, A1c, Lipid  - PT/OT

## 2019-12-17 NOTE — PLAN OF CARE
Problem: SLP Goal  Goal: SLP Goal  Description  1. Pt will be able to consume regular solids and thin liquids independently without overt s/s of airway threat or aspiration.   2. Pt will be able to answer 5/5 questions following short paragraph read aloud by therapist with 100% accy without cues or prompts  3. Pt will be able to recall 3 items after 5 minutes without category or multiple choice cues    Outcome: Ongoing, Progressing   Pt seen on this date for bedside swallow assessment and assessment of cognitive communication skills.

## 2019-12-17 NOTE — PLAN OF CARE
Discharge Planning:  Patient admitted on 12-16-19  LOS-day 1  Chart reviewed, Care plan discussed with Mr woodward  Discussed care plan with treatment team,  attending Dr Vargas/Jeni  Consults following are: neuro, PT, OT, ST  PCP updated in [facility]: yes  Pharmacy, updated in [facility]: [facility]  Insurance: medicare  DME at home: n/a  Current dispo:  Home tomorrow ?  Transportation: has reliable  Case management to follow       12/17/19 1417   Discharge Assessment   Assessment Type Discharge Planning Assessment   Confirmed/corrected address and phone number on facesheet? Yes   Assessment information obtained from? Patient;Caregiver;Medical Record   Communicated expected length of stay with patient/caregiver yes   Prior to hospitilization cognitive status: Alert/Oriented   Prior to hospitalization functional status: Independent   Current cognitive status: Alert/Oriented   Current Functional Status: Independent   Lives With alone   Able to Return to Prior Arrangements yes   Is patient able to care for self after discharge? Yes   Patient currently being followed by outpatient case management? No   Patient currently receives any other outside agency services? No   Equipment Currently Used at Home none   Do you have any problems affording any of your prescribed medications? No   Is the patient taking medications as prescribed? yes   Does the patient have transportation home? Yes   Transportation Anticipated family or friend will provide   Discharge Plan A Home;Other  (out pt rehab, PT and OT)   DME Needed Upon Discharge  none   Patient/Family in Agreement with Plan yes

## 2019-12-17 NOTE — PLAN OF CARE
Problem: Physical Therapy Goal  Goal: Physical Therapy Goal  Outcome: Met     PT orders received. Evaluation completed. Pt is independent for ambulation and transfers. Pt demonstrates normal gait pattern and normal balance. Pt expressed confidence in all aspects of his mobility. No acute PT needs identified at this time. Will d/c PT. Recommend discharge to home.

## 2019-12-17 NOTE — PT/OT/SLP EVAL
Occupational Therapy   Evaluation & Discharge Summary    Name: Calderon Burt  MRN: [id number]  Admitting Diagnosis:  Acute thalamic infarction      Recommendations:     Discharge Recommendations: home  Discharge Equipment Recommendations:  none  Barriers to discharge:  None    Assessment:     Calderon Burt is a 69 y.o. male with a medical diagnosis of Acute thalamic infarction.  He presents with pleasant affect. Performance deficits affecting function: none.  Pt demonstrates strength and ROM in (B) UE needed for ADLs that is WNL, and is able to ambulate independently.  PTA pt reports being (I) with ADLs, IADLs, and mobility.  Pt is very passionate about maintaining healthy lifestyle and exercises daily.  Pt is performing at baseline and does not require OT services in the acute care setting at this time.  Pt to d/c from OT with no further therapy needs recommended upon d/c from hospital.       Rehab Prognosis: Good; patient would benefit from acute skilled OT services to address these deficits and reach maximum level of function.       Plan:     · Patient to d/c from OT; no further therapy needs recommended    Subjective     Chief Complaint: None stated  Patient/Family Comments/goals: Return home; resume PLOF    Occupational Profile:  Living Environment: Pt lives alone in [facility], 4-5 ZOLTAN, HR on both sides but too wide to use simultaneously.  Bathroom has tub/shower combination.  Previous level of function: Pt reports being (I) with ADLs, IADLs, and mobility.    Roles and Routines: Father of 7 children, recent , retired, exercises daily, drives, loves traveling, motorcycles are his passion.  Pt reports family history of DM so he is very proactive about leading a healthy lifestyle.    Equipment Used at Home:  none  Assistance upon Discharge: Family and friend able to provide assist     Pain/Comfort:  · Pain Rating 1: 0/10  · Pain Rating Post-Intervention 1: 0/10    Patients cultural, spiritual, Amish  conflicts given the current situation: no    Objective:     Communicated with: RN (Suzie) prior to session.  Patient found HOB elevated with telemetry, peripheral IV upon OT entry to room.    General Precautions: Standard, fall, aspiration   Orthopedic Precautions:N/A   Braces: N/A     Occupational Performance:    Bed Mobility:    · Patient completed Scooting/Bridging with independence  · Patient completed Supine to Sit with independence  · Patient completed Sit to Supine with independence    Functional Mobility/Transfers:  · Sit <> Stand:  Independent x 1 trial from EOB  · Functional Mobility: Pt walked ~200 ft independently.  No instances of postural sway or LOB noted.    Activities of Daily Living:  · Lower Body Dressing: independence for doffing/donning socks while seated at EOB.    Cognitive/Visual Perceptual:  Cognitive/Psychosocial Skills:    -       Oriented to: Person, Place, Time and Situation   -       Follows Commands/attention:Follows multistep  commands  -       Communication: clear/fluent  -       Memory: No Deficits noted  -       Safety awareness/insight to disability: intact   -       Mood/Affect/Coping skills/emotional control: Appropriate to situation    Physical Exam:  Postural examination/scapula alignment:    -       No postural abnormalities identified  Skin integrity: Visible skin intact  Edema:  None noted  Sensation: -       Intact  Motor Planning: -       WNL  Dominant hand: Right  Upper Extremity Range of Motion:    -       Right Upper Extremity: WNL  -       Left Upper Extremity: WNL  Upper Extremity Strength:   -       Right Upper Extremity: WNL; 5/5 all muscle groups  -       Left Upper Extremity: WNL; 5/5 all muscle groups   Strength: 5/5 both hands  Fine Motor Coordination: Intact  Gross motor coordination: WNL  Balance:  Independent for sitting and standing  Vision:  Wears glasses; reports having glaucoma but no visual changes present since being admitted to [facility]  6 Click ADL:  AMPAC Total Score: 24    Treatment & Education:  *Pt educated on role of OT  Education:    Patient left HOB elevated with call button in reach and RN notified    GOALS:   Multidisciplinary Problems     Occupational Therapy Goals     Not on file          Multidisciplinary Problems (Resolved)        Problem: Occupational Therapy Goal    Goal Priority Disciplines Outcome Interventions   Occupational Therapy Goal   (Resolved)     OT, PT/OT Met                    History:     Past Medical History:   Diagnosis Date    Cataract     Diabetes mellitus     Glaucoma     High cholesterol     Hypertension     Tendonitis        Past Surgical History:   Procedure Laterality Date    CARPAL TUNNEL RELEASE      right/left    CATARACT EXTRACTION, BILATERAL      HEMORRHOID SURGERY      SHOULDER OPEN ROTATOR CUFF REPAIR      left    WRIST SURGERY      right/left       Time Tracking:     OT Date of Treatment: 12/17/19  OT Start Time: 0853  OT Stop Time: 0906  OT Total Time (min): 13 min    Billable Minutes:Evaluation 13   *Completed with PT    JAMES Rai  12/17/2019

## 2019-12-17 NOTE — SUBJECTIVE & OBJECTIVE
Past Medical History:   Diagnosis Date    Diabetes mellitus     Hypertension        Past Surgical History:   Procedure Laterality Date    CARPAL TUNNEL RELEASE      right/left    SHOULDER OPEN ROTATOR CUFF REPAIR      left    WRIST SURGERY      right/left       Review of patient's allergies indicates:  No Known Allergies    No current facility-administered medications on file prior to encounter.      Current Outpatient Medications on File Prior to Encounter   Medication Sig    aspirin 325 MG tablet Take 325 mg by mouth once daily.    atorvastatin (LIPITOR) 20 MG tablet Take 20 mg by mouth once daily.    metFORMIN (GLUMETZA) 1000 MG (MOD) 24 hr tablet Take 1,000 mg by mouth 2 (two) times daily with meals.    naproxen (NAPROSYN) 500 MG tablet Take 1 tablet (500 mg total) by mouth 2 (two) times daily with meals.     Family History     Problem Relation (Age of Onset)    Diabetes Mother    Stroke Father        Tobacco Use    Smoking status: Never Smoker    Smokeless tobacco: Never Used   Substance and Sexual Activity    Alcohol use: No    Drug use: No    Sexual activity: Yes     Review of Systems   Constitutional: Negative for chills, fatigue and fever.   HENT: Negative for congestion and rhinorrhea.    Eyes: Negative.    Respiratory: Negative for cough and shortness of breath.    Cardiovascular: Negative for chest pain and palpitations.   Gastrointestinal: Negative for abdominal distention, diarrhea, nausea and vomiting.   Endocrine: Negative for polydipsia and polyuria.   Genitourinary: Negative for difficulty urinating.   Musculoskeletal: Negative for back pain and myalgias.   Skin: Negative.    Neurological: Positive for weakness and numbness. Negative for dizziness, speech difficulty and headaches.   Psychiatric/Behavioral: Negative for confusion and decreased concentration.     Objective:     Vital Signs (Most Recent):  Temp: 98.2 °F (36.8 °C) (12/16/19 1516)  Pulse: 77 (12/16/19 1732)  Resp: (!) 30  (12/16/19 1732)  BP: 127/67 (12/16/19 1732)  SpO2: 98 % (12/16/19 1732) Vital Signs (24h Range):  Temp:  [98.2 °F (36.8 °C)] 98.2 °F (36.8 °C)  Pulse:  [77-97] 77  Resp:  [18-30] 30  SpO2:  [96 %-98 %] 98 %  BP: (127-160)/(67-86) 127/67     Weight: 86.2 kg (190 lb)  Body mass index is 25.77 kg/m².    Physical Exam   Constitutional: He is oriented to person, place, and time. He appears well-developed and well-nourished. No distress.   HENT:   Head: Normocephalic and atraumatic.   Mouth/Throat: Oropharynx is clear and moist.   Eyes: Pupils are equal, round, and reactive to light. Conjunctivae and EOM are normal.   Neck: Normal range of motion. Neck supple.   Cardiovascular: Normal rate, regular rhythm, normal heart sounds and intact distal pulses.   No murmur heard.  Pulmonary/Chest: Effort normal and breath sounds normal. No respiratory distress. He has no wheezes.   Abdominal: Soft. Bowel sounds are normal. He exhibits no distension. There is no tenderness. There is no guarding.   Musculoskeletal: Normal range of motion. He exhibits no edema.   Neurological: He is alert and oriented to person, place, and time. He has normal strength. No cranial nerve deficit or sensory deficit. He exhibits normal muscle tone. GCS eye subscore is 4. GCS verbal subscore is 5. GCS motor subscore is 6.   Finger to nose, heel to shin normal   Skin: He is not diaphoretic.   Nursing note and vitals reviewed.        CRANIAL NERVES     CN III, IV, VI   Pupils are equal, round, and reactive to light.  Extraocular motions are normal.        Significant Labs:   CBC:   Recent Labs   Lab 12/16/19  1543   WBC 5.84   HGB 13.3*   HCT 39.5*        CMP:   Recent Labs   Lab 12/16/19  1543      K 4.1      CO2 28   *   BUN 15   CREATININE 1.1   CALCIUM 9.7   PROT 7.8   ALBUMIN 4.1   BILITOT 0.4   ALKPHOS 63   AST 33   ALT 49*   ANIONGAP 9   EGFRNONAA >60     All pertinent labs within the past 24 hours have been  reviewed.    Significant Imaging: I have reviewed all pertinent imaging results/findings within the past 24 hours.   Imaging Results          MRI Brain W WO Contrast (In process)                MRA Neck with and without contrast (In process)                CT Head Without Contrast (Final result)  Result time 12/16/19 15:56:36    Final result by Landon Oakes DO (12/16/19 15:56:36)                 Impression:      Mild moderate size regions of left inferior frontal and anterior temporal encephalomalacia concerning for sequela of prior contusions.  No evidence for acute intracranial hemorrhage.  Clinical correlation and further evaluation as warranted..      Electronically signed by: Landon Oakes DO  Date:    12/16/2019  Time:    15:56             Narrative:    EXAMINATION:  CT HEAD WITHOUT CONTRAST    CLINICAL HISTORY:  Stroke;    TECHNIQUE:  Multiple sequential 5 mm axial images of the head without contrast.  Coronal and sagittal reformatted imaging from the axial acquisition.    COMPARISON:  None    FINDINGS:  There is mild moderate regions of encephalomalacia of the left inferior frontal and anterior temporal lobes while nonspecific most suggestive for sequela of prior contusions clinical correlation advised.  There is no evidence for acute intracranial hemorrhage or sulcal effacement to suggest large territory recent infarction.

## 2019-12-17 NOTE — TELEPHONE ENCOUNTER
----- Message from Hayley Gonzalez sent at 12/17/2019  7:45 AM CST -----  Contact: [facility]  Name of Who is Calling: [facility]    Room/Bed# : 336    Diagnosis: left sided numbness    Referring Physician:  AYANNA Rae     Call Back Number [phone]

## 2019-12-17 NOTE — NURSING
PT ARRIVED TO UNIT. PT IS STABLE, VITAL SIGNS TAKEN-SEE FLOWSHEET FOR DETAILS. HEART MONITOR APPLIED TO PATIENT, HR AND RHYTHM BEING RECORDED. PT ORIENTED TO ROOM, HOSPITAL POLICIES, AND DIETARY REGIMEN. PT EDUCATED ON FALL RISK, BED ALARMS, SMOKING CESSATION, AND ENCOURAGED TO CALL WHEN NEEDING RN OR PCT FOR PAIN MANAGEMENT, TOILETING, AND OTHER REQUESTS. MD AWARE OF PATIENTS ARRIVAL TO UNIT AND BED. ALL ADMIT DOCUMENTATION WILL BE RECORDED AND PT WILL BE MONITORED BY RN AND ANCILLARY STAFF.

## 2019-12-18 VITALS
BODY MASS INDEX: 25.53 KG/M2 | HEIGHT: 72 IN | TEMPERATURE: 98 F | WEIGHT: 188.5 LBS | DIASTOLIC BLOOD PRESSURE: 68 MMHG | OXYGEN SATURATION: 99 % | HEART RATE: 70 BPM | SYSTOLIC BLOOD PRESSURE: 115 MMHG | RESPIRATION RATE: 18 BRPM

## 2019-12-18 LAB
ALBUMIN SERPL BCP-MCNC: 3.7 G/DL (ref 3.5–5.2)
ALP SERPL-CCNC: 59 U/L (ref 55–135)
ALT SERPL W/O P-5'-P-CCNC: 45 U/L (ref 10–44)
ANION GAP SERPL CALC-SCNC: 11 MMOL/L (ref 8–16)
APTT BLDCRRT: 29.1 SEC (ref 21–32)
ASCENDING AORTA: 2.5 CM
AST SERPL-CCNC: 33 U/L (ref 10–40)
AV INDEX (PROSTH): 0.62
AV MEAN GRADIENT: 3 MMHG
AV PEAK GRADIENT: 5 MMHG
AV VALVE AREA: 2.18 CM2
AV VELOCITY RATIO: 0.54
BASOPHILS # BLD AUTO: 0.05 K/UL (ref 0–0.2)
BASOPHILS NFR BLD: 1.1 % (ref 0–1.9)
BILIRUB SERPL-MCNC: 0.4 MG/DL (ref 0.1–1)
BSA FOR ECHO PROCEDURE: 2.08 M2
BUN SERPL-MCNC: 16 MG/DL (ref 8–23)
CALCIUM SERPL-MCNC: 9.2 MG/DL (ref 8.7–10.5)
CHLORIDE SERPL-SCNC: 103 MMOL/L (ref 95–110)
CK MB SERPL-MCNC: 0.8 NG/ML (ref 0.1–6.5)
CK MB SERPL-RTO: 0.9 % (ref 0–5)
CK SERPL-CCNC: 92 U/L (ref 20–200)
CO2 SERPL-SCNC: 26 MMOL/L (ref 23–29)
CREAT SERPL-MCNC: 1.2 MG/DL (ref 0.5–1.4)
CV ECHO LV RWT: 0.44 CM
DIFFERENTIAL METHOD: ABNORMAL
DOP CALC AO PEAK VEL: 1.15 M/S
DOP CALC AO VTI: 26.23 CM
DOP CALC LVOT AREA: 3.5 CM2
DOP CALC LVOT DIAMETER: 2.12 CM
DOP CALC LVOT PEAK VEL: 0.62 M/S
DOP CALC LVOT STROKE VOLUME: 57.16 CM3
DOP CALCLVOT PEAK VEL VTI: 16.2 CM
E WAVE DECELERATION TIME: 232.62 MSEC
E/A RATIO: 0.89
E/E' RATIO: 11.4 M/S
ECHO LV POSTERIOR WALL: 0.9 CM (ref 0.6–1.1)
EOSINOPHIL # BLD AUTO: 0.1 K/UL (ref 0–0.5)
EOSINOPHIL NFR BLD: 2.4 % (ref 0–8)
ERYTHROCYTE [DISTWIDTH] IN BLOOD BY AUTOMATED COUNT: 12.3 % (ref 11.5–14.5)
EST. GFR  (AFRICAN AMERICAN): >60 ML/MIN/1.73 M^2
EST. GFR  (NON AFRICAN AMERICAN): >60 ML/MIN/1.73 M^2
FRACTIONAL SHORTENING: 30 % (ref 28–44)
GLUCOSE SERPL-MCNC: 126 MG/DL (ref 70–110)
HCT VFR BLD AUTO: 39.8 % (ref 40–54)
HGB BLD-MCNC: 12.9 G/DL (ref 14–18)
IMM GRANULOCYTES # BLD AUTO: 0.01 K/UL (ref 0–0.04)
IMM GRANULOCYTES NFR BLD AUTO: 0.2 % (ref 0–0.5)
INR PPP: 1 (ref 0.8–1.2)
INTERVENTRICULAR SEPTUM: 0.87 CM (ref 0.6–1.1)
IVRT: 0.13 MSEC
LA MAJOR: 4.06 CM
LA MINOR: 4.46 CM
LA WIDTH: 3.6 CM
LEFT ATRIUM SIZE: 2.78 CM
LEFT ATRIUM VOLUME INDEX: 17.4 ML/M2
LEFT ATRIUM VOLUME: 36.16 CM3
LEFT INTERNAL DIMENSION IN SYSTOLE: 2.9 CM (ref 2.1–4)
LEFT VENTRICLE DIASTOLIC VOLUME INDEX: 36.32 ML/M2
LEFT VENTRICLE DIASTOLIC VOLUME: 75.46 ML
LEFT VENTRICLE MASS INDEX: 54 G/M2
LEFT VENTRICLE SYSTOLIC VOLUME INDEX: 15.5 ML/M2
LEFT VENTRICLE SYSTOLIC VOLUME: 32.29 ML
LEFT VENTRICULAR INTERNAL DIMENSION IN DIASTOLE: 4.13 CM (ref 3.5–6)
LEFT VENTRICULAR MASS: 112.87 G
LV LATERAL E/E' RATIO: 9.5 M/S
LV SEPTAL E/E' RATIO: 14.25 M/S
LYMPHOCYTES # BLD AUTO: 2.6 K/UL (ref 1–4.8)
LYMPHOCYTES NFR BLD: 57.4 % (ref 18–48)
MAGNESIUM SERPL-MCNC: 1.8 MG/DL (ref 1.6–2.6)
MCH RBC QN AUTO: 29.5 PG (ref 27–31)
MCHC RBC AUTO-ENTMCNC: 32.4 G/DL (ref 32–36)
MCV RBC AUTO: 91 FL (ref 82–98)
MONOCYTES # BLD AUTO: 0.3 K/UL (ref 0.3–1)
MONOCYTES NFR BLD: 7 % (ref 4–15)
MV PEAK A VEL: 0.64 M/S
MV PEAK E VEL: 0.57 M/S
NEUTROPHILS # BLD AUTO: 1.5 K/UL (ref 1.8–7.7)
NEUTROPHILS NFR BLD: 31.9 % (ref 38–73)
NRBC BLD-RTO: 0 /100 WBC
PHOSPHATE SERPL-MCNC: 3.7 MG/DL (ref 2.7–4.5)
PLATELET # BLD AUTO: 166 K/UL (ref 150–350)
PMV BLD AUTO: 11.1 FL (ref 9.2–12.9)
POCT GLUCOSE: 123 MG/DL (ref 70–110)
POCT GLUCOSE: 97 MG/DL (ref 70–110)
POTASSIUM SERPL-SCNC: 4.2 MMOL/L (ref 3.5–5.1)
PROT SERPL-MCNC: 7 G/DL (ref 6–8.4)
PROTHROMBIN TIME: 10.6 SEC (ref 9–12.5)
PULM VEIN S/D RATIO: 1.38
PV PEAK D VEL: 0.34 M/S
PV PEAK S VEL: 0.47 M/S
RA MAJOR: 3.67 CM
RA WIDTH: 3.36 CM
RBC # BLD AUTO: 4.37 M/UL (ref 4.6–6.2)
RIGHT VENTRICULAR END-DIASTOLIC DIMENSION: 2.97 CM
SINUS: 2.89 CM
SODIUM SERPL-SCNC: 140 MMOL/L (ref 136–145)
STJ: 2.52 CM
TDI LATERAL: 0.06 M/S
TDI SEPTAL: 0.04 M/S
TDI: 0.05 M/S
TRICUSPID ANNULAR PLANE SYSTOLIC EXCURSION: 2.27 CM
TROPONIN I SERPL DL<=0.01 NG/ML-MCNC: <0.006 NG/ML (ref 0–0.03)
WBC # BLD AUTO: 4.6 K/UL (ref 3.9–12.7)

## 2019-12-18 PROCEDURE — 82550 ASSAY OF CK (CPK): CPT

## 2019-12-18 PROCEDURE — 85025 COMPLETE CBC W/AUTO DIFF WBC: CPT

## 2019-12-18 PROCEDURE — 82553 CREATINE MB FRACTION: CPT

## 2019-12-18 PROCEDURE — 99239 HOSP IP/OBS DSCHRG MGMT >30: CPT | Mod: ,,, | Performed by: NURSE PRACTITIONER

## 2019-12-18 PROCEDURE — 25000003 PHARM REV CODE 250: Performed by: NURSE PRACTITIONER

## 2019-12-18 PROCEDURE — 99239 PR HOSPITAL DISCHARGE DAY,>30 MIN: ICD-10-PCS | Mod: ,,, | Performed by: NURSE PRACTITIONER

## 2019-12-18 PROCEDURE — 36415 COLL VENOUS BLD VENIPUNCTURE: CPT

## 2019-12-18 PROCEDURE — 85730 THROMBOPLASTIN TIME PARTIAL: CPT

## 2019-12-18 PROCEDURE — G0008 ADMIN INFLUENZA VIRUS VAC: HCPCS | Performed by: HOSPITALIST

## 2019-12-18 PROCEDURE — 84484 ASSAY OF TROPONIN QUANT: CPT

## 2019-12-18 PROCEDURE — 80053 COMPREHEN METABOLIC PANEL: CPT

## 2019-12-18 PROCEDURE — 85610 PROTHROMBIN TIME: CPT

## 2019-12-18 PROCEDURE — 83735 ASSAY OF MAGNESIUM: CPT

## 2019-12-18 PROCEDURE — 92507 TX SP LANG VOICE COMM INDIV: CPT

## 2019-12-18 PROCEDURE — 63600175 PHARM REV CODE 636 W HCPCS: Performed by: HOSPITALIST

## 2019-12-18 PROCEDURE — 90662 IIV NO PRSV INCREASED AG IM: CPT | Performed by: HOSPITALIST

## 2019-12-18 PROCEDURE — 84100 ASSAY OF PHOSPHORUS: CPT

## 2019-12-18 PROCEDURE — 90471 IMMUNIZATION ADMIN: CPT | Performed by: HOSPITALIST

## 2019-12-18 RX ORDER — ASPIRIN 81 MG/1
81 TABLET ORAL DAILY
Qty: 90 TABLET | Refills: 0 | Status: SHIPPED | OUTPATIENT
Start: 2019-12-19 | End: 2020-03-18

## 2019-12-18 RX ORDER — ATORVASTATIN CALCIUM 40 MG/1
40 TABLET, FILM COATED ORAL DAILY
Qty: 90 TABLET | Refills: 0 | Status: SHIPPED | OUTPATIENT
Start: 2019-12-19 | End: 2021-09-16 | Stop reason: SDUPTHER

## 2019-12-18 RX ORDER — CLOPIDOGREL BISULFATE 75 MG/1
75 TABLET ORAL DAILY
Qty: 30 TABLET | Refills: 0 | Status: SHIPPED | OUTPATIENT
Start: 2019-12-19 | End: 2020-01-22

## 2019-12-18 RX ADMIN — INFLUENZA A VIRUS A/MICHIGAN/45/2015 X-275 (H1N1) ANTIGEN (FORMALDEHYDE INACTIVATED), INFLUENZA A VIRUS A/SINGAPORE/INFIMH-16-0019/2016 IVR-186 (H3N2) ANTIGEN (FORMALDEHYDE INACTIVATED), AND INFLUENZA B VIRUS B/MARYLAND/15/2016 BX-69A (A B/COLORADO/6/2017-LIKE VIRUS) ANTIGEN (FORMALDEHYDE INACTIVATED) 0.5 ML: 60; 60; 60 INJECTION, SUSPENSION INTRAMUSCULAR at 02:12

## 2019-12-18 RX ADMIN — ATORVASTATIN CALCIUM 40 MG: 20 TABLET, FILM COATED ORAL at 10:12

## 2019-12-18 RX ADMIN — CLOPIDOGREL BISULFATE 75 MG: 75 TABLET ORAL at 10:12

## 2019-12-18 RX ADMIN — ASPIRIN 81 MG: 81 TABLET, COATED ORAL at 10:12

## 2019-12-18 NOTE — NURSING
Report received from Suzie JACOBSON. Pt arrived to floor via wheelchair with transport team. Pt AAO4, VSS on RA and afebrile. Neuro check WNL. Aspiration and fall precautions maintained. HOB elevated per order. Continuous cardiac monitoring maintained.  Pt ambulating independently without difficulty. Family at bedside, attentive to patient. Safety maintained. Continue to monitor.

## 2019-12-18 NOTE — PT/OT/SLP PROGRESS
Speech Language Pathology Treatment    Patient Name:  Calderon Burt   MRN:  [id number]  Admitting Diagnosis: Acute thalamic infarction    Recommendations:                 General Recommendations:                1. Skilled dysphagia intervention for assessment of diet texture tolerance x1-2/times              2. Ongoing assessment of cognitive communication function     Diet recommendations:  Regular solids and thin liquids     Aspiration Precautions: HOB to 90 degrees and Standard aspiration precautions      General Precautions: Standard, fall     Communication strategies:  none    Subjective     Pt awake, watching television. Agreeable to SLP treatment.    Pain/Comfort:  · Pain Rating 1: 0/10    Objective:     Has the patient been evaluated by SLP for swallowing?   Yes  Keep patient NPO? No   Current Respiratory Status: room air      Cognitive Communication Status: Pt awake, alert, cooperative. Oriented to person, place, date, location, time with 100% accuracy. Able to recall events during hospitalization, able to recall SLP session from previous day. Pt reporting memory deficits present prior to CVA. After discussion, SLP discussed with pt that some memory deficits appear to be associated with attention based on pt's description. Discussed increasing attention to detail and allowing increased time to complete tasks may improve pt's memory for daily information. Pt able to answer y/n questions based on short paragraph (read aloud, information not relevant to pt) with 80% accuracy. Able to answer questions regarding functional paragraph (medication label and weather report) with 70% accuracy. Distractions present (PCT in to check vitals) impacted pt's ability to focus on information. Discussed this happens daily, pt unable to attend fully to details and consequently forgets or misses information. Pt also attempting to answer questions with wrong information vs asking for clarification. Discussed this tactic will  train pt to recall incorrect information vs train pt to ask for clarification when he is unsure of information. Discussed utilizing strategies to take down important info (notes section in iPhone, asking for visit summaries on paper, writing due dates of bills down, ect). Pt agreeable to implement strategies, stating he already does some of that at home. He uses a calendar and keeps bills in a certain place. Pt feels he is able to independently implement strategies at home.     Motor Speech/oral motor function: Speech is 100% intelligible at the conversation level. Pt reporting continued numbness of lips, however, feels it is improving. He denies any difficulty with meals. He reports he is able to bite and chew with no difficulties and that it does not take increased time.     Assessment:     Calderon Burt is a 69 y.o. male  s/p Acute thalamic infarction with an SLP diagnosis of mild oral dysphagia impacted by lingual, labial strength and buccal function on left side. Sensation is intact. Pt presents with mild facial asymmetry on left side. Pt is not identified as high risk for aspiration. SLP reviewed standard aspiration precautions with patient. Pt presents with mild deficits in cognitive communication function characterized by dcr delayed recall and dcr comprehension of information at paragraph level. Pt noted improvements this date. Feels oral function is improving. Discussed strategies for attention and short term memory at length, pt able to recall and demonstrate understanding.     Goals:   Multidisciplinary Problems     SLP Goals        Problem: SLP Goal    Goal Priority Disciplines Outcome   SLP Goal    Low SLP Ongoing, Progressing   Description:  1. Pt will be able to consume regular solids and thin liquids independently without overt s/s of airway threat or aspiration.   2. Pt will be able to answer 5/5 questions following short paragraph read aloud by therapist with 100% accy without cues or  prompts  3. Pt will be able to recall 3 items after 5 minutes without category or multiple choice cues                     Plan:     · Patient to be seen:  2 x/week, 3 x/week   · Plan of Care expires:  12/24/19  · Plan of Care reviewed with:  patient   · SLP Follow-Up:  Yes       Discharge recommendations:  home       Time Tracking:     SLP Treatment Date:   12/18/19  Speech Start Time:  1120  Speech Stop Time:  1153     Speech Total Time (min):  33 min    Billable Minutes: Speech Therapy Individual 33 mins    Radha Cox CCC-SLP  12/18/2019

## 2019-12-18 NOTE — DISCHARGE SUMMARY
[facility]  Hospital Medicine  Discharge Summary      Patient Name: Calderon Burt  MRN: [id number]  Admission Date: 12/16/2019  Hospital Length of Stay: 2 days  Discharge Date and Time:  12/21/2019 12:25 PM  Attending Physician: Geronimo Vargas MD   Discharging Provider: Jeni Gaffney NP  Primary Care Provider: Jigar Durant Iii, MD      HPI:   Henrique Rae PA:  70 y/o male with PMH HTN, HLD, new diagnosis of DM presented to ED with c/o intermittent tingling/numbness to the left side of his tongue, face, left arm, and left leg for over a month. He has had tingling to his tongue and face for the past few days. Reported he has been seen by his PCP for this complaint in the past and recently had a carotid US that was reportedly unremarkable. Reported when he was diagnosed with DM recently he went to an all vegetable diet and lost about 10 pounds. His significant other was worried about his fast weight loss and recently made him add meat back to his diet. He stated he tries to keep in shape and exercises frequently. Reported today he felt weak on the left side making it difficult to ambulate and came to the ED. Denied any difficulty speaking or swallowing. Denies HA, dizziness, lightheadedness, CP, SOB, N/V/D, urinary symptoms. Nonsmoker, denied alcohol or illicit drug use. No new medications. ED evaluation labs unremarkable, CT Head Mild moderate size regions of left inferior frontal and anterior temporal encephalomalacia concerning for sequela of prior contusions.  No evidence for acute intracranial hemorrhage. Placed in Observation.         Hospital Course:   After admission,  stroke work up was continued. MRI brain revealed small focal area of acute ishcemia to right thalamus. MRA head/neck without evidence for significant vascular stenosis or occlusion.  Echocardiogram  The patient's NIHSS = 2 for mild left facial droop and numbness to tongue without dysarthria. Neurology was consulted for  further evaluation.   For secondary stroke prevention, the patient was continued on aspirin 81 mg daily and will add clopidogrel 75 mg daily for thirty days. His home dose of atorvastatin will be increased from 10 mg to 40 mg daily.  His blood pressure medication was initially held to allow for permissive hypertension.  He was subsequently restarted on home antihypertensive medication twenty four hours after arrival.  The patient reports active lifestyle, working out four to five days per week.  He was counseled on lifestyle modifications such as weight reduction and avoiding excessive alcohol.  Physical therapy, occupational and speech therapy evaluated the patient and recommended home without further need for therapy services. I have discussed the patient's stroke diagnosis and have educated him on medications for secondary stroke prevention.  I have reviewed the signs and symptoms of stroke and when to call 911.  The patient will follow up with his primary care provider as scheduled and with Vascular Neurology within on month.  He was discharged in stable condition to home.      Consults:   Consults (From admission, onward)        Status Ordering Provider     Inpatient consult to Registered Dietitian/Nutritionist  Once     Provider:  (Not yet assigned)    ABAD Tejeda     IP consult to case management/social work  Once     Provider:  (Not yet assigned)    ABAD Tejeda        Service: Hospital Medicine    Final Active Diagnoses:    Diagnosis Date Noted POA    PRINCIPAL PROBLEM:  Acute thalamic infarction [I63.9] 12/17/2019 Yes    Stroke [I63.9] 12/17/2019 Yes    Numbness and tingling of left upper and lower extremity [R20.0, R20.2] 12/16/2019 Yes    Essential hypertension [I10] 12/16/2019 Yes    Type 2 diabetes mellitus, without long-term current use of insulin [E11.9] 12/16/2019 Yes    Mixed hyperlipidemia [E78.2] 12/16/2019 Yes      Problems Resolved During this Admission:        Discharged Condition: good    Disposition: Home or Self Care    Follow Up:  Follow-up Information     Jigar Durant Iii, MD. Schedule an appointment as soon as possible for a visit in 1 week.    Specialty:  Internal Medicine  Why:  follow up after hospital discharge for stroke risk factor management  Contact information:  [address]  [address]  [facility] [address] [phone]             [facility] VASCULAR NEUROLOGY. Call in 1 day.    Specialty:  Vascular Neurology  Why:  They will call you for an appointment within one month.    Contact information:  [address]  [facility] [id number] [phone]               Patient Instructions:      Ambulatory referral to Neurology   Referral Priority: Routine Referral Type: Consultation   Referral Reason: Specialty Services Required   Requested Specialty: Neurology   Number of Visits Requested: 1     Diet Cardiac     Diet diabetic     Notify your health care provider if you experience any of the following:  increased confusion or weakness     Notify your health care provider if you experience any of the following:  persistent dizziness, light-headedness, or visual disturbances     Activity as tolerated       Significant Diagnostic Studies: Labs: All labs within the past 24 hours have been reviewed       Medications:  Reconciled Home Medications:      Medication List      START taking these medications    aspirin 81 MG EC tablet  Commonly known as:  ECOTRIN  Take 1 tablet (81 mg total) by mouth once daily.     clopidogrel 75 mg tablet  Commonly known as:  PLAVIX  Take 1 tablet (75 mg total) by mouth once daily.        CHANGE how you take these medications    atorvastatin 40 MG tablet  Commonly known as:  LIPITOR  Take 1 tablet (40 mg total) by mouth once daily.  What changed:    · medication strength  · how much to take        CONTINUE taking these medications    benazepril 20 MG tablet  Commonly known as:  LOTENSIN  Take 20 mg by mouth once  daily.     bimatoprost 0.01 % Drop  Commonly known as:  LUMIGAN  1 drop every evening.     Combigan 0.2-0.5 % Drop  Generic drug:  brimonidine-timolol  Place 1 drop into both eyes.     metFORMIN 1000 MG (MOD) 24 hr tablet  Commonly known as:  GLUMETZA  Take 1,000 mg by mouth 2 (two) times daily with meals.     psyllium powder  Commonly known as:  METAMUCIL  Take 1 packet by mouth once daily. Pt takes about twice a week     tamsulosin 0.4 mg Cap  Commonly known as:  FLOMAX  Take 0.4 mg by mouth once daily.     temazepam 7.5 MG Cap  Commonly known as:  RESTORIL  Take 15 mg by mouth nightly as needed.          Time spent on the discharge of patient: >30 minutes  Patient was seen and examined on the date of discharge and determined to be suitable for discharge.         Jeni Gaffney NP  Department of Hospital Medicine  [facility]

## 2019-12-18 NOTE — NURSING
Eager & in agreement w/ DC. VU of DC instructions--paperwork passed & explained, scripts called to pharm per MD.  IV removed w/ cath tip intact, WNL. To be DCd home with self-- Pt drove self to hospital; will walk off unit once dressed and ready. Free from falls, injury, or skin breakdown this hospital admission. Hourly rounding performed this shift.

## 2019-12-18 NOTE — PLAN OF CARE
Discharge Planning:  Patient admitted on 12-16-19  LOS-day 2  Chart reviewed, Care plan discussed with Mr woodward  Discussed care plan with treatment team,  attending Dr Vargas/Jeni PERALTA at home:   Current dispo:  Home today  Transportation: has reliable  Case management to follow       12/18/19 1506   Final Note   Assessment Type Final Discharge Note   Anticipated Discharge Disposition Home   Hospital Follow Up  Appt(s) scheduled? Yes   Discharge plans and expectations educations in teach back method with documentation complete? Yes   Right Care Referral Info   Post Acute Recommendation Other   Referral Type see [facility] in 1 month.

## 2019-12-18 NOTE — CONSULTS
Food & Nutrition Education     Diet Education: Cardiac   Time Spent: 15 minutes   Learners: Patient     Nutrition Education provided handouts: [facility] Cardiac Diet, How to read a nutrition label, low sodium seasoning options.    Comments: Reviewed handouts with pt. Pt was sleepy during education. Discussed with pt the importance of the quality of food we put in our bodies. Low sodium seasoning options and how to read a nutrition label were discussed. Discussed with pt hidden sodium foods.   All questions and concerns answered. Dietitian's contact info provided.  If more education is needed please re-consult.      Thanks,  Linda Ruano RDN, LDN

## 2019-12-18 NOTE — PLAN OF CARE
Pt remains free from falls. Vitals were stable throughout the night on room air. Positions self independently. No complaints of pain or nausea. Bed in low position and call light within reach. Will continue to monitor.

## 2019-12-18 NOTE — PLAN OF CARE
Problem: SLP Goal  Goal: SLP Goal  Description  1. Pt will be able to consume regular solids and thin liquids independently without overt s/s of airway threat or aspiration.   2. Pt will be able to answer 5/5 questions following short paragraph read aloud by therapist with 100% accy without cues or prompts  3. Pt will be able to recall 3 items after 5 minutes without category or multiple choice cues    Outcome: Ongoing, Progressing       Pt seen this date for continued evaluation and treatment s/p CVA

## 2019-12-18 NOTE — PLAN OF CARE
Pt remains free from falls. Vitals were stable throughout the night on room air. Positions self independently. Pain managed with IV and PO medications, no complaints of nausea. Bed in low position and call light within reach. Will continue to monitor.

## 2019-12-20 ENCOUNTER — PATIENT OUTREACH (OUTPATIENT)
Dept: ADMINISTRATIVE | Facility: CLINIC | Age: 69
End: 2019-12-20

## 2019-12-20 NOTE — PROGRESS NOTES
Please forward this important TCC information to your provider in order to maximize the post discharge care delivery of this patient.    C3 nurse spoke with Calderon Burt  for a TCC post hospital discharge follow up call. The patient does not have a scheduled HOSFU appointment with Jigar Durant Iii, MD  within 7-14 days post hospital discharge date 12/18/2019. C3 nurse was unable to schedule HOSFU appointment in [facility].  Please contact pcp and schedule follow up appointment using HOSFU visit type on or before 01/02/2020.    Respectfully,    Adelita Márquez LPN    [facility]    [email or web address]       Please do not reply to this message, as this inbox is not routinely monitored.

## 2019-12-20 NOTE — PATIENT INSTRUCTIONS
Stroke (Completed)    You have had a mild stroke, or cerebrovascular accident (CVA). This is caused by a loss of blood flow to part of your brain. This can occur when a blood clot forms inside the carotid artery (main artery from the heart to the brain) or inside the heart. When the clot travels to the brain, it can lodge in a blood vessel and block blood flow. The other common cause of stroke is a gradual narrowing of the arteries in the brain due to buildup of fatty deposits (plaque).  Symptoms  Blocked blood flow in different areas of the brain can cause different symptoms. If you have had a stroke before, a new one may be different. A memory aid for the basic signs of a stroke is F.A.S.T.  F.A.S.T.  · F: Face drooping, or numbness on one side. This may be more noticeable when you ask the affected person to smile.  · A: Arm weakness or numbness. The affected person may have trouble using or lifting one side.  · S: Speech difficulty. Speech may be slurred or hard to understand. The affected person may also use the wrong words.  · T: Time to call 911. Time is critical in treating a stroke. Call 911 as soon as you suspect a stroke has happened--even a small one. The sooner treatment is started the better, even if the symptoms go away.  Other common symptoms of a stroke include:  · Having difficulty getting the right words to come out  · Weakness in one leg  · Numbness on one side  · Difficulty walking  · Trouble with coordination  · Trouble with vision  · Headache  · Confusion  · Dizziness  Treatment  After you have had a stroke, you are at risk of having another. Be sure to follow up with your healthcare provider for further evaluation and treatment. If problems are found, your healthcare provider will recommend treatment with medicines and/or procedures.  To reduce your chance of having another stroke, you may be prescribed medicines. These include medicines to prevent blood clots, such as antiplatelet or  anticoagulant medicines.  Home care  · Rest at home and avoid exertion for the next few days.  · If your healthcare provider has prescribed medicines, take them as directed.  Follow-up care  Follow up with your healthcare provider, or as advised. Additional tests may be needed. If you had an X-ray, CT scan, MRI, or ECG (electrocardiogram), it will be reviewed by a specialist. You will be notified of any new findings that will affect your care.  Call 911  Contact emergency services right away if any of these occur:  · Any of your stroke symptoms worsen  · New problems with speech, confusion, vision, walking, coordination, facial droop, or weakness or numbness on one side of your body  · Severe headache, fainting spell, dizziness, or seizure  · Chest pain or shortness of breath  Remember F.A.S.T. (described above). If you notice warning signs and symptoms of stroke, CALL 911 without delay.  Date Last Reviewed: 9/21/2015  © 5484-6657 [employer]. [address] [place] [address]. All rights reserved. This information is not intended as a substitute for professional medical care. Always follow your healthcare professional's instructions.        Stroke (Completed)    You have had a mild stroke, or cerebrovascular accident (CVA). This is caused by a loss of blood flow to part of your brain. This can occur when a blood clot forms inside the carotid artery (main artery from the heart to the brain) or inside the heart. When the clot travels to the brain, it can lodge in a blood vessel and block blood flow. The other common cause of stroke is a gradual narrowing of the arteries in the brain due to buildup of fatty deposits (plaque).  Symptoms  Blocked blood flow in different areas of the brain can cause different symptoms. If you have had a stroke before, a new one may be different. A memory aid for the basic signs of a stroke is F.A.S.T.  F.A.S.T.  · F: Face drooping, or numbness on one side. This may  be more noticeable when you ask the affected person to smile.  · A: Arm weakness or numbness. The affected person may have trouble using or lifting one side.  · S: Speech difficulty. Speech may be slurred or hard to understand. The affected person may also use the wrong words.  · T: Time to call 911. Time is critical in treating a stroke. Call 911 as soon as you suspect a stroke has happened--even a small one. The sooner treatment is started the better, even if the symptoms go away.  Other common symptoms of a stroke include:  · Having difficulty getting the right words to come out  · Weakness in one leg  · Numbness on one side  · Difficulty walking  · Trouble with coordination  · Trouble with vision  · Headache  · Confusion  · Dizziness  Treatment  After you have had a stroke, you are at risk of having another. Be sure to follow up with your healthcare provider for further evaluation and treatment. If problems are found, your healthcare provider will recommend treatment with medicines and/or procedures.  To reduce your chance of having another stroke, you may be prescribed medicines. These include medicines to prevent blood clots, such as antiplatelet or anticoagulant medicines.  Home care  · Rest at home and avoid exertion for the next few days.  · If your healthcare provider has prescribed medicines, take them as directed.  Follow-up care  Follow up with your healthcare provider, or as advised. Additional tests may be needed. If you had an X-ray, CT scan, MRI, or ECG (electrocardiogram), it will be reviewed by a specialist. You will be notified of any new findings that will affect your care.  Call 911  Contact emergency services right away if any of these occur:  · Any of your stroke symptoms worsen  · New problems with speech, confusion, vision, walking, coordination, facial droop, or weakness or numbness on one side of your body  · Severe headache, fainting spell, dizziness, or seizure  · Chest pain or  shortness of breath  Remember F.A.S.T. (described above). If you notice warning signs and symptoms of stroke, CALL 911 without delay.  Date Last Reviewed: 9/21/2015  © 6301-0472 [employer]. [address] [place] [address], [place] [address]. All rights reserved. This information is not intended as a substitute for professional medical care. Always follow your healthcare professional's instructions.

## 2020-01-22 RX ORDER — CLOPIDOGREL BISULFATE 75 MG/1
TABLET ORAL
Qty: 30 TABLET | Refills: 0 | Status: SHIPPED | OUTPATIENT
Start: 2020-01-22 | End: 2020-08-25 | Stop reason: CLARIF

## 2020-03-03 RX ORDER — CLOPIDOGREL BISULFATE 75 MG/1
TABLET ORAL
Qty: 30 TABLET | Refills: 0 | OUTPATIENT
Start: 2020-03-03

## 2020-04-24 ENCOUNTER — TELEPHONE (OUTPATIENT)
Dept: NEUROLOGY | Facility: CLINIC | Age: 70
End: 2020-04-24

## 2020-08-02 ENCOUNTER — OFFICE VISIT (OUTPATIENT)
Dept: URGENT CARE | Facility: CLINIC | Age: 70
End: 2020-08-02
Payer: MEDICARE

## 2020-08-02 VITALS
DIASTOLIC BLOOD PRESSURE: 78 MMHG | WEIGHT: 190 LBS | HEART RATE: 82 BPM | HEIGHT: 72 IN | TEMPERATURE: 98 F | SYSTOLIC BLOOD PRESSURE: 148 MMHG | BODY MASS INDEX: 25.73 KG/M2 | RESPIRATION RATE: 18 BRPM | OXYGEN SATURATION: 98 %

## 2020-08-02 DIAGNOSIS — M25.511 ACUTE PAIN OF RIGHT SHOULDER: Primary | ICD-10-CM

## 2020-08-02 PROCEDURE — 73030 XR SHOULDER TRAUMA 3 VIEW RIGHT: ICD-10-PCS | Mod: RT,S$GLB,, | Performed by: RADIOLOGY

## 2020-08-02 PROCEDURE — 99214 OFFICE O/P EST MOD 30 MIN: CPT | Mod: S$GLB,,, | Performed by: NURSE PRACTITIONER

## 2020-08-02 PROCEDURE — 73030 X-RAY EXAM OF SHOULDER: CPT | Mod: RT,S$GLB,, | Performed by: RADIOLOGY

## 2020-08-02 PROCEDURE — 99214 PR OFFICE/OUTPT VISIT, EST, LEVL IV, 30-39 MIN: ICD-10-PCS | Mod: S$GLB,,, | Performed by: NURSE PRACTITIONER

## 2020-08-02 RX ORDER — DICLOFENAC SODIUM 10 MG/G
2 GEL TOPICAL DAILY
Qty: 100 G | Refills: 0 | Status: ON HOLD | OUTPATIENT
Start: 2020-08-02 | End: 2020-08-28 | Stop reason: HOSPADM

## 2020-08-02 NOTE — PATIENT INSTRUCTIONS
Take NSAIDS such as aleve or tylenol for pain.     Alternate heat and ice.     Follow up with orthopedics for further imaging.             Shoulder Pain with Uncertain Cause  Shoulder pain can have many causes. Pain often comes from the structures that surround the shoulder joint. These are the joint capsule, ligaments, tendons, muscles, and bursa. Pain can also come from cartilage in the joint. Cartilage can become worn out or injured. Its important to know whats causing your pain so the healthcare provider can use the correct treatment. But sometimes its difficult to find the exact cause of shoulder pain. You may need to see a specialist (orthopedist). You may also need special tests such as a CT scan or MRI. The provider may need to use special tools to look inside the joint (arthroscopy).  Shoulder pain can be treated with a sling or a device that keeps your shoulder from moving. You can take an anti-inflammatory medicine such as ibuprofen to ease pain. You may need to do special shoulder exercises. Follow up with a specialist if the pain is severe or doesnt go away after a few weeks.  Home care  Follow these tips when caring for yourself at home:  · If a sling was given to you, leave it in place for the time advised by your healthcare provider. If you arent sure how long to wear it, ask for advice. If the sling becomes loose, adjust it so that your forearm is level with the ground. Your shoulder should feel well supported.  · Put an ice pack on the injured area for 20 minutes every 1 to 2 hours the first day. You can make your own ice pack by putting ice cubes in a plastic bag. Wrap the bag in a thin towel. Continue with ice packs 3 to 4 times a day for the next 2 days. Then use the pack as needed to ease pain and swelling.  · You may use acetaminophen or ibuprofen to control pain, unless another pain medicine was prescribed. If you have chronic liver or kidney disease, talk with your healthcare provider  before using these medicines. Also talk with your provider if youve ever had a stomach ulcer or GI bleeding.  · Shoulder pain may seem worse at night, when there is less to distract you from the pain. If you sleep on your side, try to keep weight off your painful shoulder. Propping pillows behind you may stop you from rolling over onto that shoulder during sleep.   · Shoulder and elbow joints can become stiff if left in a sling for too long. You should start range of motion exercises about 7 to 10 days after the injury. Talk with your provider to find out what type of exercises to do and how soon to start.  · You can take the sling off to shower or bathe.  Follow-up care  Follow up with your healthcare provider if you dont start to get better in the next 5 days.  When to seek medical advice  Call your healthcare provider right away if any of these occur:  · Pain or swelling gets worse or continues for more than a few days  · Your hand or fingers become cold, blue, numb, or tingly  · Large amount of bruising on your shoulder or upper arm  · Difficulty moving your hand or fingers  · Weakness in your hand or fingers  · Your shoulder becomes stiff  · It feels like your shoulder is popping out  · You are less able to do your daily activities  Date Last Reviewed: 10/1/2016  © 4588-6404 [employer], [email or web address]. [address] [place] [address], [place] [address]. All rights reserved. This information is not intended as a substitute for professional medical care. Always follow your healthcare professional's instructions.

## 2020-08-02 NOTE — PROGRESS NOTES
Subjective:       Patient ID: Calderon Burt is a 69 y.o. male.    Vitals:  height is 6' (1.829 m) and weight is 86.2 kg (190 lb). His temperature is 98.1 °F (36.7 °C). His blood pressure is 148/78 (abnormal) and his pulse is 82. His respiration is 18 and oxygen saturation is 98%.     Chief Complaint: Shoulder Pain (right)    Patient fell on right shoulder  Yesterday afternoon.    Shoulder Pain   This is a new problem. The current episode started yesterday. The problem has been gradually worsening. The quality of the pain is described as aching. Pertinent negatives include no limited range of motion. He has tried heat and acetaminophen for the symptoms.       Constitution: Negative for fatigue.   HENT: Negative for facial swelling and facial trauma.    Neck: Negative for neck stiffness.   Cardiovascular: Negative for chest trauma.   Eyes: Negative for eye trauma, double vision and blurred vision.   Gastrointestinal: Negative for abdominal trauma, abdominal pain and rectal bleeding.   Genitourinary: Negative for hematuria, genital trauma and pelvic pain.   Musculoskeletal: Positive for pain, trauma and joint pain. Negative for joint swelling, abnormal ROM of joint and pain with walking.   Skin: Negative for color change, wound, abrasion and laceration.   Neurological: Negative for dizziness, history of vertigo, light-headedness, coordination disturbances, altered mental status and loss of consciousness.   Hematologic/Lymphatic: Negative for history of bleeding disorder.   Psychiatric/Behavioral: Negative for altered mental status.       Objective:      Physical Exam   Constitutional: He is oriented to person, place, and time. He appears well-developed. He is cooperative.  Non-toxic appearance. He does not appear ill. No distress.   HENT:   Head: Normocephalic and atraumatic.   Ears:   Right Ear: Hearing, tympanic membrane, external ear and ear canal normal.   Left Ear: Hearing, tympanic membrane, external ear and  ear canal normal.   Nose: Nose normal. No mucosal edema, rhinorrhea or nasal deformity. No epistaxis. Right sinus exhibits no maxillary sinus tenderness and no frontal sinus tenderness. Left sinus exhibits no maxillary sinus tenderness and no frontal sinus tenderness.   Mouth/Throat: Uvula is midline, oropharynx is clear and moist and mucous membranes are normal. No trismus in the jaw. Normal dentition. No uvula swelling. No posterior oropharyngeal erythema.   Eyes: Conjunctivae and lids are normal. Right eye exhibits no discharge. Left eye exhibits no discharge. No scleral icterus.   Neck: Trachea normal, normal range of motion, full passive range of motion without pain and phonation normal. Neck supple.   Cardiovascular: Normal rate, regular rhythm, normal heart sounds and normal pulses.   Pulmonary/Chest: Effort normal and breath sounds normal. No respiratory distress.   Abdominal: Soft. Normal appearance and bowel sounds are normal. He exhibits no distension, no pulsatile midline mass and no mass. There is no abdominal tenderness.   Musculoskeletal:         General: No deformity.      Right shoulder: He exhibits decreased range of motion and tenderness.        Arms:       Comments: Limited ROM   Positive empty can test   Neurological: He is alert and oriented to person, place, and time. He exhibits normal muscle tone. Coordination normal.   Skin: Skin is warm, dry, intact, not diaphoretic and not pale. Psychiatric: His speech is normal and behavior is normal. Judgment and thought content normal.   Nursing note and vitals reviewed.      Xr Shoulder Trauma 3 View Right    Result Date: 8/2/2020  EXAMINATION: XR SHOULDER TRAUMA 3 VIEW RIGHT CLINICAL HISTORY: Pain in right shoulder TECHNIQUE: Three or four views of the right shoulder were performed. COMPARISON: Chest radiograph 01/16/2018 FINDINGS: Unchanged chronic deformity of the mid right clavicle, likely sequela of remote trauma with healed fracture.  No acute  displaced fracture, dislocation or destructive osseous process.  Minimal degenerative changes noted about the shoulder.  Generalized osteopenia.  No subcutaneous emphysema or radiodense retained foreign body.  No right apical pneumothorax.     No acute displaced fracture-dislocation identified. Electronically signed by: Claudio Mattson MD Date:    08/02/2020 Time:    17:57  Assessment:       1. Acute pain of right shoulder        Plan:         Acute pain of right shoulder  -     XR SHOULDER TRAUMA 3 VIEW RIGHT; Future; Expected date: 08/02/2020  -     Ambulatory referral/consult to Orthopedics  -     diclofenac sodium (VOLTAREN) 1 % Gel; Apply 2 g topically once daily.  Dispense: 100 g; Refill: 0      Patient Instructions   Take NSAIDS such as aleve or tylenol for pain.     Alternate heat and ice.     Follow up with orthopedics for further imaging.             Shoulder Pain with Uncertain Cause  Shoulder pain can have many causes. Pain often comes from the structures that surround the shoulder joint. These are the joint capsule, ligaments, tendons, muscles, and bursa. Pain can also come from cartilage in the joint. Cartilage can become worn out or injured. Its important to know whats causing your pain so the healthcare provider can use the correct treatment. But sometimes its difficult to find the exact cause of shoulder pain. You may need to see a specialist (orthopedist). You may also need special tests such as a CT scan or MRI. The provider may need to use special tools to look inside the joint (arthroscopy).  Shoulder pain can be treated with a sling or a device that keeps your shoulder from moving. You can take an anti-inflammatory medicine such as ibuprofen to ease pain. You may need to do special shoulder exercises. Follow up with a specialist if the pain is severe or doesnt go away after a few weeks.  Home care  Follow these tips when caring for yourself at home:  · If a sling was given to you, leave it in  place for the time advised by your healthcare provider. If you arent sure how long to wear it, ask for advice. If the sling becomes loose, adjust it so that your forearm is level with the ground. Your shoulder should feel well supported.  · Put an ice pack on the injured area for 20 minutes every 1 to 2 hours the first day. You can make your own ice pack by putting ice cubes in a plastic bag. Wrap the bag in a thin towel. Continue with ice packs 3 to 4 times a day for the next 2 days. Then use the pack as needed to ease pain and swelling.  · You may use acetaminophen or ibuprofen to control pain, unless another pain medicine was prescribed. If you have chronic liver or kidney disease, talk with your healthcare provider before using these medicines. Also talk with your provider if youve ever had a stomach ulcer or GI bleeding.  · Shoulder pain may seem worse at night, when there is less to distract you from the pain. If you sleep on your side, try to keep weight off your painful shoulder. Propping pillows behind you may stop you from rolling over onto that shoulder during sleep.   · Shoulder and elbow joints can become stiff if left in a sling for too long. You should start range of motion exercises about 7 to 10 days after the injury. Talk with your provider to find out what type of exercises to do and how soon to start.  · You can take the sling off to shower or bathe.  Follow-up care  Follow up with your healthcare provider if you dont start to get better in the next 5 days.  When to seek medical advice  Call your healthcare provider right away if any of these occur:  · Pain or swelling gets worse or continues for more than a few days  · Your hand or fingers become cold, blue, numb, or tingly  · Large amount of bruising on your shoulder or upper arm  · Difficulty moving your hand or fingers  · Weakness in your hand or fingers  · Your shoulder becomes stiff  · It feels like your shoulder is popping out  · You  are less able to do your daily activities  Date Last Reviewed: 10/1/2016  © 2754-9415 [employer], [email or web address]. [address] [place] [address], [place] [address]. All rights reserved. This information is not intended as a substitute for professional medical care. Always follow your healthcare professional's instructions.

## 2020-08-05 ENCOUNTER — OFFICE VISIT (OUTPATIENT)
Dept: ORTHOPEDICS | Facility: CLINIC | Age: 70
End: 2020-08-05
Payer: MEDICARE

## 2020-08-05 VITALS
WEIGHT: 191.38 LBS | HEIGHT: 72 IN | SYSTOLIC BLOOD PRESSURE: 170 MMHG | HEART RATE: 70 BPM | DIASTOLIC BLOOD PRESSURE: 98 MMHG | BODY MASS INDEX: 25.92 KG/M2

## 2020-08-05 DIAGNOSIS — S49.91XA TRAUMATIC INJURY OF SHOULDER, RIGHT, INITIAL ENCOUNTER: Primary | ICD-10-CM

## 2020-08-05 DIAGNOSIS — M25.511 ACUTE PAIN OF RIGHT SHOULDER: ICD-10-CM

## 2020-08-05 PROCEDURE — 99214 PR OFFICE/OUTPT VISIT, EST, LEVL IV, 30-39 MIN: ICD-10-PCS | Mod: S$PBB,,, | Performed by: PHYSICIAN ASSISTANT

## 2020-08-05 PROCEDURE — 99999 PR PBB SHADOW E&M-EST. PATIENT-LVL IV: ICD-10-PCS | Mod: PBBFAC,,, | Performed by: PHYSICIAN ASSISTANT

## 2020-08-05 PROCEDURE — 99214 OFFICE O/P EST MOD 30 MIN: CPT | Mod: S$PBB,,, | Performed by: PHYSICIAN ASSISTANT

## 2020-08-05 PROCEDURE — 99214 OFFICE O/P EST MOD 30 MIN: CPT | Mod: PBBFAC | Performed by: PHYSICIAN ASSISTANT

## 2020-08-05 PROCEDURE — 99999 PR PBB SHADOW E&M-EST. PATIENT-LVL IV: CPT | Mod: PBBFAC,,, | Performed by: PHYSICIAN ASSISTANT

## 2020-08-05 NOTE — LETTER
August 5, 2020      Jessica Morgan, NP  [address] [facility] [place] [address]           [facility] - Orthopedics  [address] [facility], [address]LO[address]  [facility] [address]  Phone: [phone]          Patient: Calderon Burt   MR Number: [id number]   YOB: 1950   Date of Visit: 8/5/2020       Dear Jessica Morgan:    Thank you for referring Calderon Burt to me for evaluation. Attached you will find relevant portions of my assessment and plan of care.    If you have questions, please do not hesitate to call me. I look forward to following Calderon Burt along with you.    Sincerely,    Edvin Matthew PA-C    Enclosure  CC:  No Recipients    If you would like to receive this communication electronically, please contact [email or web address] or [phone] to request more information on [employer] Link access.    For providers and/or their staff who would like to refer a patient to [facility], please contact us through our one-stop-shop provider referral line, Vasyl Mcclure, at [phone].    If you feel you have received this communication in error or would no longer like to receive these types of communications, please e-mail [email or web address]          No

## 2020-08-05 NOTE — PROGRESS NOTES
SUBJECTIVE:     Chief Complaint & History of Present Illness:  Calderon Burt is a  New  patient 69 y.o. male who is seen here today with a complaint of    Chief Complaint   Patient presents with    Right Shoulder - Pain    .  Patient is here for continuing care for sudden onset pain in the right shoulder following a fall approximately 1 week ago.  Was seen and treated in urgent care 08/02/2020 x-rays at that time showed no evidence of fracture dislocation or significant bony abnormality he does it degenerative changes in the shoulder as well as an old fracture of clavicle.  He has pain in the lateral and posterior aspects of the shoulder and a marked decreased range of motion secondary to this pain he cannot abduct greater than 75 or 80° and has significant pain with any type of weight-bearing pressure  On a scale of 1-10, with 10 being worst pain imaginable, he rates this pain as 4 on good days and 9 on bad days.  he describes the pain as tender.    Review of patient's allergies indicates:  No Known Allergies      Current Outpatient Medications   Medication Sig Dispense Refill    benazepril (LOTENSIN) 20 MG tablet Take 20 mg by mouth once daily.      bimatoprost (LUMIGAN) 0.01 % Drop 1 drop every evening.      brimonidine-timolol (COMBIGAN) 0.2-0.5 % Drop Place 1 drop into both eyes.      diclofenac sodium (VOLTAREN) 1 % Gel Apply 2 g topically once daily. 100 g 0    metFORMIN (GLUMETZA) 1000 MG (MOD) 24 hr tablet Take 1,000 mg by mouth 2 (two) times daily with meals.      psyllium (METAMUCIL) powder Take 1 packet by mouth once daily. Pt takes about twice a week      tamsulosin (FLOMAX) 0.4 mg Cap Take 0.4 mg by mouth once daily.      temazepam (RESTORIL) 7.5 MG Cap Take 15 mg by mouth nightly as needed.      aspirin (ECOTRIN) 81 MG EC tablet Take 1 tablet (81 mg total) by mouth once daily. 90 tablet 0    atorvastatin (LIPITOR) 40 MG tablet Take 1 tablet (40 mg total) by mouth once daily. 90 tablet 0     clopidogrel (PLAVIX) 75 mg tablet TAKE 1 TABLET(75 MG) BY MOUTH EVERY DAY (Patient not taking: Reported on 8/5/2020) 30 tablet 0     No current facility-administered medications for this visit.        Past Medical History:   Diagnosis Date    Cataract     Diabetes mellitus     Glaucoma     High cholesterol     Hypertension     Tendonitis        Past Surgical History:   Procedure Laterality Date    CARPAL TUNNEL RELEASE      right/left    CATARACT EXTRACTION, BILATERAL      HEMORRHOID SURGERY      SHOULDER OPEN ROTATOR CUFF REPAIR      left    WRIST SURGERY      right/left       Vital Signs (Most Recent)  Vitals:    08/05/20 0815   BP: (!) 170/98   Pulse: 70       Review of Systems:  ROS:  Constitutional: no fever or chills  Eyes: no visual changes, Positive for glaucoma  ENT: no nasal congestion or sore throat  Respiratory: no cough or shortness of breath  Cardiovascular: no chest pain or palpitations  Gastrointestinal: no nausea or vomiting, tolerating diet  Genitourinary: no hematuria or dysuria  Integument/Breast: no rash or pruritis  Hematologic/Lymphatic: no easy bruising or lymphadenopathy  Musculoskeletal: no arthralgias or myalgias  Neurological: no seizures or tremors, Positive for stroke, acute they will make infarction  Behavioral/Psych: no auditory or visual hallucinations  Endocrine: no heat or cold intolerance, Positive diabetes type 2      OBJECTIVE:     PHYSICAL EXAM:  Height: 6' (182.9 cm) Weight: 86.8 kg (191 lb 5.8 oz), General Appearance: Well nourished, well developed, in no acute distress.  Neurological: Mood & affect are normal.  Shoulder exam: right  Tenderness: AC joint, biceps tendon, lateral acromial  ROM: forward flexion 70 / 70, extension 40 / 40, full abduction 60 / 60, abduction - glenohumeral 40 / 40, external rotation 20 / 20, internal rotation mid sagittal line  Shoulder Strength: biceps 5/5, triceps 4/5, abduction 4/5, adduction 5/5, external rotation with shoulder  at side 4/5, flexion 5/5, extension 4/5  positive for tenderness about the glenohumeral joint, positive for tenderness over the acromioclavicular joint and positive for impingement sign  Stability tests: anterior apprehension test negative and posterior apprehension test negative  Special Tests:Cross-chest abduction: diffuse pain, pain at AC joint                     RADIOGRAPHS:  X-rays taken today films reviewed by me demonstrate no evidence of fracture dislocation or about the shoulder old healed fracture of the midshaft of the clavicle is noted moderate degenerative changes of the glenohumeral region with significant joint space narrowing and osteophytic spurring    ASSESSMENT/PLAN:       ICD-10-CM ICD-9-CM   1. Traumatic injury of shoulder, right, initial encounter  S49.91XA 959.2   2. Acute pain of right shoulder  M25.511 719.41       Plan: We discussed with the patient at length all the different treatment options available for his rightshoulder including anti-inflammatories, acetaminophen, rest, ice, Physical therapy to include strengthening exercise, occasional cortisone injections for temporary relief, arthroscopic surgical repair, and finally shoulder arthroplasty.   Light of trauma and marked decrease in range of motion and stability will proceed with MRI of the shoulder  Will contact the patient following the MRI to discuss treatment plans

## 2020-08-11 ENCOUNTER — HOSPITAL ENCOUNTER (OUTPATIENT)
Dept: RADIOLOGY | Facility: HOSPITAL | Age: 70
Discharge: HOME | End: 2020-08-11
Attending: PHYSICIAN ASSISTANT
Payer: MEDICARE

## 2020-08-11 DIAGNOSIS — S49.91XA TRAUMATIC INJURY OF SHOULDER, RIGHT, INITIAL ENCOUNTER: ICD-10-CM

## 2020-08-11 DIAGNOSIS — M25.511 ACUTE PAIN OF RIGHT SHOULDER: ICD-10-CM

## 2020-08-11 PROCEDURE — 73221 MRI JOINT UPR EXTREM W/O DYE: CPT | Mod: 26,RT,, | Performed by: RADIOLOGY

## 2020-08-11 PROCEDURE — 73221 MRI JOINT UPR EXTREM W/O DYE: CPT | Mod: TC,RT

## 2020-08-11 PROCEDURE — 73221 MRI SHOULDER WITHOUT CONTRAST RIGHT: ICD-10-PCS | Mod: 26,RT,, | Performed by: RADIOLOGY

## 2020-08-19 ENCOUNTER — HOSPITAL ENCOUNTER (OUTPATIENT)
Dept: RADIOLOGY | Facility: HOSPITAL | Age: 70
Discharge: HOME | End: 2020-08-19
Attending: ORTHOPAEDIC SURGERY
Payer: MEDICARE

## 2020-08-19 ENCOUNTER — OFFICE VISIT (OUTPATIENT)
Dept: SPORTS MEDICINE | Facility: CLINIC | Age: 70
End: 2020-08-19
Payer: MEDICARE

## 2020-08-19 VITALS
HEIGHT: 72 IN | HEART RATE: 75 BPM | BODY MASS INDEX: 25.73 KG/M2 | DIASTOLIC BLOOD PRESSURE: 91 MMHG | WEIGHT: 190 LBS | SYSTOLIC BLOOD PRESSURE: 153 MMHG

## 2020-08-19 DIAGNOSIS — S46.011A TRAUMATIC COMPLETE TEAR OF RIGHT ROTATOR CUFF, INITIAL ENCOUNTER: Primary | ICD-10-CM

## 2020-08-19 DIAGNOSIS — Z01.818 PRE-OP TESTING: ICD-10-CM

## 2020-08-19 DIAGNOSIS — E11.9 TYPE 2 DIABETES MELLITUS WITHOUT COMPLICATION, WITHOUT LONG-TERM CURRENT USE OF INSULIN: ICD-10-CM

## 2020-08-19 DIAGNOSIS — M25.511 RIGHT SHOULDER PAIN, UNSPECIFIED CHRONICITY: ICD-10-CM

## 2020-08-19 PROCEDURE — 99204 OFFICE O/P NEW MOD 45 MIN: CPT | Mod: S$PBB,,, | Performed by: ORTHOPAEDIC SURGERY

## 2020-08-19 PROCEDURE — 73030 X-RAY EXAM OF SHOULDER: CPT | Mod: 26,RT,, | Performed by: RADIOLOGY

## 2020-08-19 PROCEDURE — 99999 PR PBB SHADOW E&M-EST. PATIENT-LVL IV: ICD-10-PCS | Mod: PBBFAC,,, | Performed by: ORTHOPAEDIC SURGERY

## 2020-08-19 PROCEDURE — 99999 PR PBB SHADOW E&M-EST. PATIENT-LVL IV: CPT | Mod: PBBFAC,,, | Performed by: ORTHOPAEDIC SURGERY

## 2020-08-19 PROCEDURE — 73030 X-RAY EXAM OF SHOULDER: CPT | Mod: TC,RT

## 2020-08-19 PROCEDURE — 99214 OFFICE O/P EST MOD 30 MIN: CPT | Mod: PBBFAC,25 | Performed by: ORTHOPAEDIC SURGERY

## 2020-08-19 PROCEDURE — 99204 PR OFFICE/OUTPT VISIT, NEW, LEVL IV, 45-59 MIN: ICD-10-PCS | Mod: S$PBB,,, | Performed by: ORTHOPAEDIC SURGERY

## 2020-08-19 PROCEDURE — 73030 XR SHOULDER COMPLETE 2 OR MORE VIEWS RIGHT: ICD-10-PCS | Mod: 26,RT,, | Performed by: RADIOLOGY

## 2020-08-19 NOTE — LETTER
August 25, 2020      Edvin Matthew PA-C  [address]  [facility] [address]           [facility]  [address]  [facility] [address]  Phone: [phone]          Patient: Calderon Burt   MR Number: [id number]   YOB: 1950   Date of Visit: 8/19/2020       Dear Edvin Matthew:    Thank you for referring Calderon Burt to me for evaluation. Attached you will find relevant portions of my assessment and plan of care.    If you have questions, please do not hesitate to call me. I look forward to following Calderon Burt along with you.    Sincerely,    CHENCHO Reyes MD    Enclosure  CC:  No Recipients    If you would like to receive this communication electronically, please contact [email or web address] or [phone] to request more information on [employer] Link access.    For providers and/or their staff who would like to refer a patient to [facility], please contact us through our one-stop-shop provider referral line, [facility], at [phone].    If you feel you have received this communication in error or would no longer like to receive these types of communications, please e-mail [email or web address]

## 2020-08-19 NOTE — PROGRESS NOTES
CC: Right shoulder pain     69 y.o. Male presents as a new patient to me. Patient is RHD. Patient was referred from [facility].  He is currently retired. Complaint is right shoulder pain over the anterior shoulder after a fall onto his outstretched right arm while bowling approximately 2 weeks ago.  Denies instability event.  He reports acute onset pain which has significantly worsened over the last few weeks.  Localizes over the lateral subdeltoid recess.  Worse with daily activity, particularly overhead. Pain is disruptive to sleep at night.  Painful at rest.  Better with rest and activity modifications. Denies neck pain or radicular symptoms.  Upon further history he does state that he has had diffuse shoulder pain for the last several years and had been planning on seeing an orthopedic issues for his pain, decreased strength, ROM.  Prior treatment has included therapy and oral medication.  Here today to discuss diagnosis and treatment options.     Patient previously had a left rotator cuff repair >20 years ago.  At this time he denies any significant issues on that side.    PMHx is notable for diabetes. A1C 7.0  - today  Negative for tobacco.     PAST MEDICAL HISTORY:   Past Medical History:   Diagnosis Date    Cataract     Diabetes mellitus     Glaucoma     High cholesterol     Hypertension     Tendonitis      PAST SURGICAL HISTORY:  Past Surgical History:   Procedure Laterality Date    CARPAL TUNNEL RELEASE      right/left    CATARACT EXTRACTION, BILATERAL      HEMORRHOID SURGERY      SHOULDER OPEN ROTATOR CUFF REPAIR      left    WRIST SURGERY      right/left     FAMILY HISTORY:  Family History   Problem Relation Age of Onset    Diabetes Mother     Alcohol abuse Mother     Hypertension Mother     Stroke Father     Alcohol abuse Father     Hypertension Father     Diabetes Sister     Hypertension Sister     Diabetes Brother     Hypertension Brother     Diabetes Daughter     Diabetes Son      Vision loss Son     Stroke Son      MEDICATIONS:    Current Outpatient Medications:     aspirin (ECOTRIN) 81 MG EC tablet, Take 1 tablet (81 mg total) by mouth once daily., Disp: 90 tablet, Rfl: 0    atorvastatin (LIPITOR) 40 MG tablet, Take 1 tablet (40 mg total) by mouth once daily., Disp: 90 tablet, Rfl: 0    benazepril (LOTENSIN) 20 MG tablet, Take 20 mg by mouth once daily., Disp: , Rfl:     bimatoprost (LUMIGAN) 0.01 % Drop, 1 drop every evening., Disp: , Rfl:     brimonidine-timolol (COMBIGAN) 0.2-0.5 % Drop, Place 1 drop into both eyes., Disp: , Rfl:     clopidogrel (PLAVIX) 75 mg tablet, TAKE 1 TABLET(75 MG) BY MOUTH EVERY DAY (Patient not taking: Reported on 8/5/2020), Disp: 30 tablet, Rfl: 0    diclofenac sodium (VOLTAREN) 1 % Gel, Apply 2 g topically once daily., Disp: 100 g, Rfl: 0    metFORMIN (GLUMETZA) 1000 MG (MOD) 24 hr tablet, Take 1,000 mg by mouth 2 (two) times daily with meals., Disp: , Rfl:     psyllium (METAMUCIL) powder, Take 1 packet by mouth once daily. Pt takes about twice a week, Disp: , Rfl:     tamsulosin (FLOMAX) 0.4 mg Cap, Take 0.4 mg by mouth once daily., Disp: , Rfl:     temazepam (RESTORIL) 7.5 MG Cap, Take 15 mg by mouth nightly as needed., Disp: , Rfl:     ALLERGIES:  Review of patient's allergies indicates:  No Known Allergies     REVIEW OF SYSTEMS:  Constitution: Negative. Negative for chills, fever and night sweats.    Hematologic/Lymphatic: Negative for bleeding problem. Does not bruise/bleed easily.   Skin: Negative for dry skin, itching and rash.   Musculoskeletal: Negative for falls. Positive for right shoulder pain and muscle weakness.     All other review of symptoms were reviewed and found to be noncontributory.     PHYSICAL EXAMINATION:  Vitals:  There were no vitals taken for this visit.   General: Well-developed well-nourished 69 y.o. malein no acute distress   Cardiovascular: Regular rhythm by palpation of distal pulse, normal color and  temperature, no concerning varicosities on symptomatic side   Lungs: No labored breathing or wheezing appreciated   Neuro: Alert and oriented ×3   Psychiatric: well oriented to person, place and time, demonstrates normal mood and affect   Skin: No rashes, lesions or ulcers, normal temperature, turgor, and texture on uninvolved extremity    Ortho/SPM Exam  Examination of the right shoulder demonstrates active forward elevation to 90 some limitation due to pain, ER with arm at side to 40, IR to L4. Passive FE to 140 with guarding, ER to 60. Prominent tenderness along the proximal biceps tendon and anterior shoulder. Negative AC tenderness. 4/5 resisted supraspinatus testing. 4/5 resisted infraspinatus testing. Positive belly press test. Positive Lift-Off.  Positive bear hug.  Stable shoulder.  Positive drop-arm.  Negative external rotation lag sign.    IMAGING:  Xrays including AP, Outlet and Axillary Lateral of RIGHT shoulder are ordered / images reviewed by Dr. Reyes and Dr. Slater:   Well positioned shoulder without dislocation or subluxation. Appropriate AH distance. No sign of fracture or soft tissue avulsion.  Glenohumeral joint is fairly well maintained with minimal osteoarthritic changes.  Healed fracture middle 3rd of the clavicle.     MRI of RIGHT shoulder reviewed by Dr. Reyes and Dr. Slater and discussed with patient. Study shows:   Previous anterior dislocation with Hill-Sachs contusion of the humeral head and tear of the anterior to inferior labrum. Complete tear of subscapularis tendon with retraction to the level of the glenoid. Full-thickness insertional tear of the supraspinatus tendon. Subluxation of the long head biceps tendon with tendinosis and interstitial tear.    On my read:  Negative tangent sign. Goutallier 1 of the SSc    ASSESSMENT:      ICD-10-CM ICD-9-CM   1. Traumatic complete tear of right rotator cuff, initial encounter  S46.011A 840.4   2. Right shoulder pain, unspecified  chronicity  M25.511 719.41   3. Pre-op testing  Z01.818 V72.84   4. Type 2 diabetes mellitus without complication, without long-term current use of insulin  E11.9 250.00       PLAN:     Findings discussed with the patient.  Diagnosis is a significant full-thickness subscapularis and supraspinatus tears involving the anterior superior shoulder with medial subluxation of the biceps.  There are some minimal signs of chronicity on the MRI.  Suspected acute on chronic type tear.  Treatment options reviewed.  I have concerns regarding the patient's ability to get back to all desired activity in the setting of non operative treatment for this problem.  Particularly given the patient's prior history.  Although non operative treatment options were discussed, surgery was recommended.  Plan would be for right shoulder arthroscopic rotator cuff repair, open subpectoral biceps tenodesis, subacromial decompression.  The expected postop rehab recovery course was reviewed.  The patient will need preoperative medical clearance.  He has elected to proceed.    Informed Consent:    The details of the surgical procedure were explained, including the location of probable incisions and a description of possible hardware and/or grafts to be used. Alternatives to both operative and non-operative options with associated risks and benefits were discussed. The patient understands the likely convalescence after surgery and, in particular, the expected postop rehab and recovery course. The outlined risks and potential complications of the proposed procedure include but are not limited to: infection, poor wound healing, scarring, deformity, stiffness, swelling, continued or recurrent pain, instability, hardware or prosthetic failure if implanted, symptomatic hardware requiring removal, dislocation, weakness, neurovascular injury, numbness, chronic regional pain disorder, tissue nonhealing/irreparability/retear, subsequent contralateral limb  injury or pathology, chondral injury, arthritis, fracture, blood clot formation, inability to return to previous level of activity, anesthetic or regional block complication up to death, need for additional procedure as indicated intraoperatively, and potential need for further surgery.    The patient was also informed and understands that the risks of surgery are greater for patients with a current condition or history of heart disease, obesity, clotting disorders, recurrent infections, steroid use, current or past smoking, and factors such as sedentary lifestyle and noncompliance with medications, therapy or follow-up. The degree of the increased risk is hard to estimate with any degree of precision. If applicable, smoking cessation was discussed.     All questions were answered. The patient has verbalized understanding of these issues and wishes to proceed with the surgery as discussed.        Procedures

## 2020-08-20 ENCOUNTER — OFFICE VISIT (OUTPATIENT)
Dept: SPORTS MEDICINE | Facility: CLINIC | Age: 70
End: 2020-08-20
Payer: MEDICARE

## 2020-08-20 DIAGNOSIS — M25.511 RIGHT SHOULDER PAIN, UNSPECIFIED CHRONICITY: ICD-10-CM

## 2020-08-20 DIAGNOSIS — S46.011A TRAUMATIC COMPLETE TEAR OF RIGHT ROTATOR CUFF, INITIAL ENCOUNTER: Primary | ICD-10-CM

## 2020-08-20 PROCEDURE — 99212 OFFICE O/P EST SF 10 MIN: CPT | Mod: PBBFAC | Performed by: PHYSICIAN ASSISTANT

## 2020-08-20 PROCEDURE — 99499 UNLISTED E&M SERVICE: CPT | Mod: S$PBB,,, | Performed by: PHYSICIAN ASSISTANT

## 2020-08-20 PROCEDURE — 99999 PR PBB SHADOW E&M-EST. PATIENT-LVL II: CPT | Mod: PBBFAC,,, | Performed by: PHYSICIAN ASSISTANT

## 2020-08-20 PROCEDURE — 99999 PR PBB SHADOW E&M-EST. PATIENT-LVL II: ICD-10-PCS | Mod: PBBFAC,,, | Performed by: PHYSICIAN ASSISTANT

## 2020-08-20 PROCEDURE — 99499 NO LOS: ICD-10-PCS | Mod: S$PBB,,, | Performed by: PHYSICIAN ASSISTANT

## 2020-08-20 RX ORDER — SODIUM CHLORIDE 9 MG/ML
INJECTION, SOLUTION INTRAVENOUS CONTINUOUS
Status: CANCELLED | OUTPATIENT
Start: 2020-08-20

## 2020-08-20 RX ORDER — ASPIRIN 81 MG/1
81 TABLET ORAL 2 TIMES DAILY
Qty: 28 TABLET | Refills: 0 | Status: SHIPPED | OUTPATIENT
Start: 2020-08-20 | End: 2021-09-16 | Stop reason: SDUPTHER

## 2020-08-20 RX ORDER — OXYCODONE HYDROCHLORIDE 5 MG/1
5 TABLET ORAL EVERY 6 HOURS PRN
Qty: 28 TABLET | Refills: 0 | Status: SHIPPED | OUTPATIENT
Start: 2020-08-20 | End: 2020-09-04

## 2020-08-20 RX ORDER — ROPIVACAINE/EPI/CLONIDINE/KET 2.46-0.005
SYRINGE (ML) INJECTION ONCE
Status: CANCELLED | OUTPATIENT
Start: 2020-08-20 | End: 2020-08-20

## 2020-08-20 RX ORDER — ONDANSETRON 4 MG/1
4 TABLET, FILM COATED ORAL EVERY 8 HOURS PRN
Qty: 21 TABLET | Refills: 0 | Status: SHIPPED | OUTPATIENT
Start: 2020-08-20 | End: 2020-09-04

## 2020-08-20 NOTE — H&P (VIEW-ONLY)
Calderon Burt  is here for a completion of his perioperative paperwork. he  Is scheduled to undergo Right shoulder arthroscopic rotator cuff repair vs. Debridement, possible subacromial decompression, possible distal clavicle excision, open subpec biceps tenodesis on 8/28/2020.  He is a healthy individual and does need clearance for this procedure.     PCP clearance: scanned in media    Risks, indications and benefits of the surgical procedure were discussed with the patient. All questions with regard to surgery, rehab, expected return to functional activities, activities of daily living and recreational endeavors were answered to his satisfaction.    Discussed COVID-19 with the patient, they are aware of our current policies and procedures, were given the option of delaying surgery, and they elect to proceed.    Patient was informed and understands the risks of surgery are greater for patients with a current condition or hx of heart disease, obesity, clotting disorders, recurrent infections, steroid use, current or past smoking, and factors such as sedentary lifestyle and noncompliance with medications, therapy or f/u. The degree of the increased risk is hard to estimate w/ any degree of precision.    Once no other questions were asked, a brief history and physical exam was then performed.    PAST MEDICAL HISTORY:   Past Medical History:   Diagnosis Date    Cataract     Diabetes mellitus     Glaucoma     High cholesterol     Hypertension     Tendonitis      PAST SURGICAL HISTORY:   Past Surgical History:   Procedure Laterality Date    CARPAL TUNNEL RELEASE      right/left    CATARACT EXTRACTION, BILATERAL      HEMORRHOID SURGERY      SHOULDER OPEN ROTATOR CUFF REPAIR      left    WRIST SURGERY      right/left     FAMILY HISTORY:   Family History   Problem Relation Age of Onset    Diabetes Mother     Alcohol abuse Mother     Hypertension Mother     Stroke Father     Alcohol abuse Father      Hypertension Father     Diabetes Sister     Hypertension Sister     Diabetes Brother     Hypertension Brother     Diabetes Daughter     Diabetes Son     Vision loss Son     Stroke Son      SOCIAL HISTORY:   Social History     Socioeconomic History    Marital status:      Spouse name: Not on file    Number of children: Not on file    Years of education: Not on file    Highest education level: Not on file   Occupational History    Not on file   Social Needs    Financial resource strain: Not on file    Food insecurity     Worry: Not on file     Inability: Not on file    Transportation needs     Medical: Not on file     Non-medical: Not on file   Tobacco Use    Smoking status: Former Smoker    Smokeless tobacco: Never Used   Substance and Sexual Activity    Alcohol use: No    Drug use: No    Sexual activity: Yes   Lifestyle    Physical activity     Days per week: Not on file     Minutes per session: Not on file    Stress: Not on file   Relationships    Social connections     Talks on phone: Not on file     Gets together: Not on file     Attends Congregation service: Not on file     Active member of club or organization: Not on file     Attends meetings of clubs or organizations: Not on file     Relationship status: Not on file   Other Topics Concern    Not on file   Social History Narrative    Not on file       MEDICATIONS:   Current Outpatient Medications:     aspirin (ECOTRIN) 81 MG EC tablet, Take 1 tablet (81 mg total) by mouth once daily., Disp: 90 tablet, Rfl: 0    atorvastatin (LIPITOR) 40 MG tablet, Take 1 tablet (40 mg total) by mouth once daily., Disp: 90 tablet, Rfl: 0    benazepril (LOTENSIN) 20 MG tablet, Take 20 mg by mouth once daily., Disp: , Rfl:     bimatoprost (LUMIGAN) 0.01 % Drop, 1 drop every evening., Disp: , Rfl:     brimonidine-timolol (COMBIGAN) 0.2-0.5 % Drop, Place 1 drop into both eyes., Disp: , Rfl:     clopidogrel (PLAVIX) 75 mg tablet, TAKE 1 TABLET(75  MG) BY MOUTH EVERY DAY (Patient not taking: Reported on 8/5/2020), Disp: 30 tablet, Rfl: 0    diclofenac sodium (VOLTAREN) 1 % Gel, Apply 2 g topically once daily., Disp: 100 g, Rfl: 0    metFORMIN (GLUMETZA) 1000 MG (MOD) 24 hr tablet, Take 1,000 mg by mouth 2 (two) times daily with meals., Disp: , Rfl:     psyllium (METAMUCIL) powder, Take 1 packet by mouth once daily. Pt takes about twice a week, Disp: , Rfl:     tamsulosin (FLOMAX) 0.4 mg Cap, Take 0.4 mg by mouth once daily., Disp: , Rfl:     temazepam (RESTORIL) 7.5 MG Cap, Take 15 mg by mouth nightly as needed., Disp: , Rfl:   ALLERGIES: Review of patient's allergies indicates:  No Known Allergies    Review of Systems   Constitution: Negative. Negative for chills, fever and night sweats.   HENT: Negative for congestion and headaches.    Eyes: Negative for blurred vision, left vision loss and right vision loss.   Cardiovascular: Negative for chest pain and syncope.   Respiratory: Negative for cough and shortness of breath.    Endocrine: Negative for polydipsia, polyphagia and polyuria.   Hematologic/Lymphatic: Negative for bleeding problem. Does not bruise/bleed easily.   Skin: Negative for dry skin, itching and rash.   Musculoskeletal: Negative for falls and muscle weakness.   Gastrointestinal: Negative for abdominal pain and bowel incontinence.   Genitourinary: Negative for bladder incontinence and nocturia.   Neurological: Negative for disturbances in coordination, loss of balance and seizures.   Psychiatric/Behavioral: Negative for depression. The patient does not have insomnia.    Allergic/Immunologic: Negative for hives and persistent infections.     PHYSICAL EXAM:  GEN: A&Ox3, WD WN NAD  HEENT: WNL  CHEST: CTAB, no W/R/R  HEART: RRR, no M/R/G   ABD: Soft, NT ND, BS x4 QUADS  MS: Refer to previous note for detailed MS exam  NEURO: CN II-XII intact       The surgical consent was then reviewed with the patient, who agreed with all the contents of the  consent form and it was signed.     PHYSICAL THERAPY:  He was also instructed regarding physical therapy and will begin on POD#1-3. He is doing physical therapy at [facility].    POST OP CARE: Instructions were reviewed including care of the wound and dressing after surgery and when he can shower.     PAIN MANAGEMENT: Calderon Burt was instructed regarding the Polar ice unit that will be in place after surgery and his postoperative pain medications.     MEDICATION:  Roxicodone 5 mg 1-2 q 4 hours PRN for pain  Zofran 4 mg q 8 hours PRN for nausea and vomiting.  Aspirin 81mg BID x 2 weeks for DVT prophylaxis starting on the evening after surgery.      Post op meds to be delivered bedside prior to discharge. Deliver to family if patient is in surgery at 5pm.     Patient was instructed to purchase and take Colace to counter possible GI side effects of taking opiates.     DVT prophylaxis was discussed with the patient today including risk factors for developing DVTs and history of DVTs. The patient was asked if any specific recommendations were given from the doctor/s that did pre-operative surgical clearance.      If the patient was previously taking 81mg baby aspirin, they were told to not take additional baby aspirin, using the above stated aspirin and to restart the 81mg aspirin daily after completion of the aspirin dose.      Patient was also told to buy over the counter Prilosec medication and take it once daily for GI protection as long as they are taking NSAIDs or Aspirin.     The patient was told that narcotic pain medications may make them drowsy and instructions were given to not sign legal documents, drive or operate heavy machinery, cars, or equipment while under the influence of narcotic medications.     Dr. Reyes was present in clinic during this pre-op evaluation. The patient was offered the opportunity to ask Dr. Reyes any further questions regarding the procedure  which may not have been addressed during their previous informed consent discussion. The patient has declined to see Dr. Reyes today.    As there were no other questions to be asked, he was given my business card along with Dr. Reyes's business card if he has any questions or concerns prior to surgery or in the postop period.     I explained to following and the patient expressed understanding:  The patient is currently aware of the COVID19 pandemic and that proceeding with their surgical procedure could potentially increase exposure to coronavirus in the community. The patient understands that there is the possibility of delayed or cancelled appts or PT visits in the future. They understand that infection with the coronavirus could complicate their surgery recovery. They are aware of the current policies and procedures of [facility] and the government regarding the pandemic and they were given the option of delaying my surgery. The patient elects to proceed with surgery at this time.      Patient denies having any symptoms such as cough, SOB, fever, loss of taste/smell.     The patient has been scheduled to undergo coronavirus testing on the day prior to surgery.      The patient was instructed to practice strict social distancing, hand washing/hygiene, respiratory hygiene, and cough etiquette from now until 6 weeks following surgery to reduce the risk of luis felipe coronavirus.

## 2020-08-20 NOTE — H&P
Calderon Burt  is here for a completion of his perioperative paperwork. he  Is scheduled to undergo Right shoulder arthroscopic rotator cuff repair vs. Debridement, possible subacromial decompression, possible distal clavicle excision, open subpec biceps tenodesis on 8/28/2020.  He is a healthy individual and does need clearance for this procedure.     PCP clearance: scanned in media    Risks, indications and benefits of the surgical procedure were discussed with the patient. All questions with regard to surgery, rehab, expected return to functional activities, activities of daily living and recreational endeavors were answered to his satisfaction.    Discussed COVID-19 with the patient, they are aware of our current policies and procedures, were given the option of delaying surgery, and they elect to proceed.    Patient was informed and understands the risks of surgery are greater for patients with a current condition or hx of heart disease, obesity, clotting disorders, recurrent infections, steroid use, current or past smoking, and factors such as sedentary lifestyle and noncompliance with medications, therapy or f/u. The degree of the increased risk is hard to estimate w/ any degree of precision.    Once no other questions were asked, a brief history and physical exam was then performed.    PAST MEDICAL HISTORY:   Past Medical History:   Diagnosis Date    Cataract     Diabetes mellitus     Glaucoma     High cholesterol     Hypertension     Tendonitis      PAST SURGICAL HISTORY:   Past Surgical History:   Procedure Laterality Date    CARPAL TUNNEL RELEASE      right/left    CATARACT EXTRACTION, BILATERAL      HEMORRHOID SURGERY      SHOULDER OPEN ROTATOR CUFF REPAIR      left    WRIST SURGERY      right/left     FAMILY HISTORY:   Family History   Problem Relation Age of Onset    Diabetes Mother     Alcohol abuse Mother     Hypertension Mother     Stroke Father     Alcohol abuse Father      Hypertension Father     Diabetes Sister     Hypertension Sister     Diabetes Brother     Hypertension Brother     Diabetes Daughter     Diabetes Son     Vision loss Son     Stroke Son      SOCIAL HISTORY:   Social History     Socioeconomic History    Marital status:      Spouse name: Not on file    Number of children: Not on file    Years of education: Not on file    Highest education level: Not on file   Occupational History    Not on file   Social Needs    Financial resource strain: Not on file    Food insecurity     Worry: Not on file     Inability: Not on file    Transportation needs     Medical: Not on file     Non-medical: Not on file   Tobacco Use    Smoking status: Former Smoker    Smokeless tobacco: Never Used   Substance and Sexual Activity    Alcohol use: No    Drug use: No    Sexual activity: Yes   Lifestyle    Physical activity     Days per week: Not on file     Minutes per session: Not on file    Stress: Not on file   Relationships    Social connections     Talks on phone: Not on file     Gets together: Not on file     Attends Yazidism service: Not on file     Active member of club or organization: Not on file     Attends meetings of clubs or organizations: Not on file     Relationship status: Not on file   Other Topics Concern    Not on file   Social History Narrative    Not on file       MEDICATIONS:   Current Outpatient Medications:     aspirin (ECOTRIN) 81 MG EC tablet, Take 1 tablet (81 mg total) by mouth once daily., Disp: 90 tablet, Rfl: 0    atorvastatin (LIPITOR) 40 MG tablet, Take 1 tablet (40 mg total) by mouth once daily., Disp: 90 tablet, Rfl: 0    benazepril (LOTENSIN) 20 MG tablet, Take 20 mg by mouth once daily., Disp: , Rfl:     bimatoprost (LUMIGAN) 0.01 % Drop, 1 drop every evening., Disp: , Rfl:     brimonidine-timolol (COMBIGAN) 0.2-0.5 % Drop, Place 1 drop into both eyes., Disp: , Rfl:     clopidogrel (PLAVIX) 75 mg tablet, TAKE 1 TABLET(75  MG) BY MOUTH EVERY DAY (Patient not taking: Reported on 8/5/2020), Disp: 30 tablet, Rfl: 0    diclofenac sodium (VOLTAREN) 1 % Gel, Apply 2 g topically once daily., Disp: 100 g, Rfl: 0    metFORMIN (GLUMETZA) 1000 MG (MOD) 24 hr tablet, Take 1,000 mg by mouth 2 (two) times daily with meals., Disp: , Rfl:     psyllium (METAMUCIL) powder, Take 1 packet by mouth once daily. Pt takes about twice a week, Disp: , Rfl:     tamsulosin (FLOMAX) 0.4 mg Cap, Take 0.4 mg by mouth once daily., Disp: , Rfl:     temazepam (RESTORIL) 7.5 MG Cap, Take 15 mg by mouth nightly as needed., Disp: , Rfl:   ALLERGIES: Review of patient's allergies indicates:  No Known Allergies    Review of Systems   Constitution: Negative. Negative for chills, fever and night sweats.   HENT: Negative for congestion and headaches.    Eyes: Negative for blurred vision, left vision loss and right vision loss.   Cardiovascular: Negative for chest pain and syncope.   Respiratory: Negative for cough and shortness of breath.    Endocrine: Negative for polydipsia, polyphagia and polyuria.   Hematologic/Lymphatic: Negative for bleeding problem. Does not bruise/bleed easily.   Skin: Negative for dry skin, itching and rash.   Musculoskeletal: Negative for falls and muscle weakness.   Gastrointestinal: Negative for abdominal pain and bowel incontinence.   Genitourinary: Negative for bladder incontinence and nocturia.   Neurological: Negative for disturbances in coordination, loss of balance and seizures.   Psychiatric/Behavioral: Negative for depression. The patient does not have insomnia.    Allergic/Immunologic: Negative for hives and persistent infections.     PHYSICAL EXAM:  GEN: A&Ox3, WD WN NAD  HEENT: WNL  CHEST: CTAB, no W/R/R  HEART: RRR, no M/R/G   ABD: Soft, NT ND, BS x4 QUADS  MS: Refer to previous note for detailed MS exam  NEURO: CN II-XII intact       The surgical consent was then reviewed with the patient, who agreed with all the contents of the  consent form and it was signed.     PHYSICAL THERAPY:  He was also instructed regarding physical therapy and will begin on POD#1-3. He is doing physical therapy at [facility].    POST OP CARE: Instructions were reviewed including care of the wound and dressing after surgery and when he can shower.     PAIN MANAGEMENT: Calderon Burt was instructed regarding the Polar ice unit that will be in place after surgery and his postoperative pain medications.     MEDICATION:  Roxicodone 5 mg 1-2 q 4 hours PRN for pain  Zofran 4 mg q 8 hours PRN for nausea and vomiting.  Aspirin 81mg BID x 2 weeks for DVT prophylaxis starting on the evening after surgery.      Post op meds to be delivered bedside prior to discharge. Deliver to family if patient is in surgery at 5pm.     Patient was instructed to purchase and take Colace to counter possible GI side effects of taking opiates.     DVT prophylaxis was discussed with the patient today including risk factors for developing DVTs and history of DVTs. The patient was asked if any specific recommendations were given from the doctor/s that did pre-operative surgical clearance.      If the patient was previously taking 81mg baby aspirin, they were told to not take additional baby aspirin, using the above stated aspirin and to restart the 81mg aspirin daily after completion of the aspirin dose.      Patient was also told to buy over the counter Prilosec medication and take it once daily for GI protection as long as they are taking NSAIDs or Aspirin.     The patient was told that narcotic pain medications may make them drowsy and instructions were given to not sign legal documents, drive or operate heavy machinery, cars, or equipment while under the influence of narcotic medications.     Dr. Reyes was present in clinic during this pre-op evaluation. The patient was offered the opportunity to ask Dr. Reyes any further questions regarding the procedure  which may not have been addressed during their previous informed consent discussion. The patient has declined to see Dr. Reyes today.    As there were no other questions to be asked, he was given my business card along with Dr. Reyes's business card if he has any questions or concerns prior to surgery or in the postop period.     I explained to following and the patient expressed understanding:  The patient is currently aware of the COVID19 pandemic and that proceeding with their surgical procedure could potentially increase exposure to coronavirus in the community. The patient understands that there is the possibility of delayed or cancelled appts or PT visits in the future. They understand that infection with the coronavirus could complicate their surgery recovery. They are aware of the current policies and procedures of [facility] and the government regarding the pandemic and they were given the option of delaying my surgery. The patient elects to proceed with surgery at this time.      Patient denies having any symptoms such as cough, SOB, fever, loss of taste/smell.     The patient has been scheduled to undergo coronavirus testing on the day prior to surgery.      The patient was instructed to practice strict social distancing, hand washing/hygiene, respiratory hygiene, and cough etiquette from now until 6 weeks following surgery to reduce the risk of luis felipe coronavirus.

## 2020-08-21 DIAGNOSIS — S46.011A TRAUMATIC TEAR OF RIGHT ROTATOR CUFF, UNSPECIFIED TEAR EXTENT, INITIAL ENCOUNTER: Primary | ICD-10-CM

## 2020-08-21 DIAGNOSIS — M19.011 ARTHRITIS OF RIGHT ACROMIOCLAVICULAR JOINT: ICD-10-CM

## 2020-08-21 DIAGNOSIS — M75.21 BICEPS TENDINITIS OF RIGHT UPPER EXTREMITY: ICD-10-CM

## 2020-08-25 ENCOUNTER — LAB VISIT (OUTPATIENT)
Dept: SPORTS MEDICINE | Facility: CLINIC | Age: 70
End: 2020-08-25
Payer: MEDICARE

## 2020-08-25 DIAGNOSIS — Z01.818 PRE-OP TESTING: ICD-10-CM

## 2020-08-25 LAB — SARS-COV-2 RNA RESP QL NAA+PROBE: NOT DETECTED

## 2020-08-25 PROCEDURE — U0003 INFECTIOUS AGENT DETECTION BY NUCLEIC ACID (DNA OR RNA); SEVERE ACUTE RESPIRATORY SYNDROME CORONAVIRUS 2 (SARS-COV-2) (CORONAVIRUS DISEASE [COVID-19]), AMPLIFIED PROBE TECHNIQUE, MAKING USE OF HIGH THROUGHPUT TECHNOLOGIES AS DESCRIBED BY CMS-2020-01-R: HCPCS

## 2020-08-25 NOTE — PROGRESS NOTES
COVID Screening Specimen Collected    POSTIVE for the following symptoms:  None    NEGATIVE for the following symptoms:  Fever  Cough  Shortness of breath  Difficulty breathing  GI symptoms such as diarrhea or nausea  Loss of taste  Loss of smell    Patient was given:  1.Instructions for Patients Awaiting COVID-19 Test Results  2. CDC: What to do if you are sick with coronavirus disease 2019 (COVID-19)

## 2020-08-25 NOTE — ANESTHESIA PAT ROS NOTE
08/25/2020  Calderon Burt is a 69 y.o., male.      Pre-op Assessment    I have reviewed the Patient Summary Reports.     I have reviewed the Nursing Notes. I have reviewed the NPO Status.   I have reviewed the Medications.     Review of Systems  Anesthesia Hx:  None on file   Social:  Former Smoker, No Alcohol Use    Hematology/Oncology:  Hematology Normal   Oncology Normal     EENT/Dental:EENT/Dental Normal   Cardiovascular:   Hypertension Denies MI.   hyperlipidemia 12/2019  Left Ventricle Normal ejection fraction at 57%.  Normal left ventricular cavity size. Concentric remodeling observed. Grade I (mild) left ventricular diastolic dysfunction consistent with impaired relaxation. Normal left atrial pressure. No wall motion abnormalities.  Right Ventricle Normal cavity size, wall thickness and systolic function. Wall motion normal.  Left Atrium The left atrium is normal. Left atrial volume index is 17.4 mL/m2.  Right Atrium There is normal right atrial size.  Aortic Valve The aortic valve appears structurally normal. There is normal leaflet mobility.  Mitral Valve The mitral valve appears structurally normal. There is normal leaflet mobility. Mild regurgitation.  Tricuspid Valve The tricuspid valve appears structurally normal. There is normal leaflet mobility.  Pericardium No pericardial effusion. Pericardium is normal.     Pulmonary:  Pulmonary Normal  Denies Shortness of breath.  Denies Recent URI.    Renal/:  Renal/ Normal     Hepatic/GI:  Hepatic/GI Normal    Musculoskeletal:  Musculoskeletal Normal    Neurological:   CVA    Endocrine:   Diabetes, type 2    Dermatological:  Skin Normal    Psych:  Psychiatric Normal              Anesthesia Assessment: Preoperative EQUATION    Planned Procedure: Procedure(s) (LRB):  REPAIR, ROTATOR CUFF, ARTHROSCOPIC (Right)  FIXATION, TENDON-BICEPS TENODESIS  "(Right)  ARTHROSCOPY, SHOULDER, WITH DISTAL CLAVICLE EXCISION (Right)  Requested Anesthesia Type:General  Surgeon: CHENCHO Reyes MD  Service: Orthopedics  Known or anticipated Date of Surgery:8/28/2020    Surgeon notes: reviewed    Electronic QUestionnaire Assessment completed via nurse interview with patient.        Triage considerations:     The patient has no apparent active cardiac condition (No unstable coronary Syndrome such as severe unstable angina or recent [<1 month] myocardial infarction, decompensated CHF, severe valvular   disease or significant arrhythmia)    Previous anesthesia records:None on file    Last PCP note: contacted to [facility] to ensure PCP clearance is pending(8/25)  Subspecialty notes: Neurology has a telephone note however no f/u  Spoke with pt.  States he was never really told he had a "stroke"  He reports feeling while at this time.    Other important co-morbidities: DM2, HLD, HTN and CVA      Tests already available:  Has recent A1C then last labs were in Dec 2019 for CVA admit             Instructions given. (See in Nurse's note)    Optimization:  Medical Opinion Indicated Pending       Sub-specialist consult indicated:   TBD       Plan:    Testing:  TBD      Patient  has previously scheduled Medical Appointment:    Navigation: Tests TBD                          Results will be tracked by Preop Clinic.     "

## 2020-08-26 ENCOUNTER — TELEPHONE (OUTPATIENT)
Dept: SPORTS MEDICINE | Facility: CLINIC | Age: 70
End: 2020-08-26

## 2020-08-26 NOTE — TELEPHONE ENCOUNTER
LVM for patient stating that we still have not received pre op clearance for his surgery this Friday and that he needed to contact the office to get the clearance.     Td Bronson Ms, OTC,   Clinical Assistant to Dr. Reyes

## 2020-08-27 ENCOUNTER — ANESTHESIA EVENT (OUTPATIENT)
Dept: SURGERY | Facility: HOSPITAL | Age: 70
End: 2020-08-27
Payer: MEDICARE

## 2020-08-27 ENCOUNTER — TELEPHONE (OUTPATIENT)
Dept: SPORTS MEDICINE | Facility: CLINIC | Age: 70
End: 2020-08-27

## 2020-08-27 ENCOUNTER — TELEPHONE (OUTPATIENT)
Dept: PREADMISSION TESTING | Facility: HOSPITAL | Age: 70
End: 2020-08-27

## 2020-08-27 NOTE — TELEPHONE ENCOUNTER
Spoke with patient about pre-op clearance from PCP. Patient reports being cleared by PCP, but we have not received clearance. Patient has called PCP office to have them fax clearance, and will hand-deliver, if needed.

## 2020-08-27 NOTE — TELEPHONE ENCOUNTER
----- Message from Agatha Dawkins MD sent at 8/27/2020  6:52 AM CDT -----  Regarding: FW: Pre-Op clearance  Apoorva, please schedule with one of us today   Jigar approached me yesterday that she did not have any pt's for today   I gave her one   She can take this one also   ----- Message -----  From: Agatha Dawkins MD  Sent: 8/26/2020   5:21 PM CDT  To: CHENCHO Reyes MD, Jigar Lozoya, RN, #  Subject: RE: Pre-Op clearance                             Thanks Td Campo to help   One of us  will get to him   ----- Message -----  From: Td Bronson  Sent: 8/26/2020   5:15 PM CDT  To: Agatha Dawkins MD  Subject: Pre-Op clearance                                 Good Afternoon Dr. Dawkins,    I hope you're well. We have a patient who is scheduled for surgery with Dr. Reyes this Friday 8/28/2020, but he still needs pre-operative clearance. Would you possibly be able to help see this patient tomorrow for an appointment so he can get his clearance please?    Thank you, please let me know if there is any way I can help.    Td Bronson Ms, OTC,   Clinical Assistant to Dr. Reyes

## 2020-08-27 NOTE — TELEPHONE ENCOUNTER
Spoke with patient about surgery arrival time (7AM) and location ([facility]) for surgery tomorrow.

## 2020-08-27 NOTE — TELEPHONE ENCOUNTER
----- Message from Frankie Ash sent at 8/27/2020 10:46 AM CDT -----  Contact: pt  Please call pt at [phone]    Patient is returning staff call regarding future surgery    Thank you

## 2020-08-27 NOTE — TELEPHONE ENCOUNTER
----- Message from Robert Reinoso sent at 8/27/2020 10:08 AM CDT -----  Pt asking for a callback regarding to receiving a fax to move forward with his surgery on tomorrow  please contact  pt           Contact info [phone]

## 2020-08-28 ENCOUNTER — ANESTHESIA (OUTPATIENT)
Dept: SURGERY | Facility: HOSPITAL | Age: 70
End: 2020-08-28
Payer: MEDICARE

## 2020-08-28 ENCOUNTER — HOSPITAL ENCOUNTER (OUTPATIENT)
Facility: HOSPITAL | Age: 70
Discharge: HOME | End: 2020-08-28
Attending: ORTHOPAEDIC SURGERY | Admitting: ORTHOPAEDIC SURGERY
Payer: MEDICARE

## 2020-08-28 VITALS
SYSTOLIC BLOOD PRESSURE: 166 MMHG | BODY MASS INDEX: 25.6 KG/M2 | RESPIRATION RATE: 16 BRPM | TEMPERATURE: 98 F | DIASTOLIC BLOOD PRESSURE: 94 MMHG | HEIGHT: 72 IN | HEART RATE: 67 BPM | WEIGHT: 189 LBS | OXYGEN SATURATION: 98 %

## 2020-08-28 DIAGNOSIS — S46.011A TRAUMATIC COMPLETE TEAR OF RIGHT ROTATOR CUFF, INITIAL ENCOUNTER: ICD-10-CM

## 2020-08-28 LAB — POCT GLUCOSE: 122 MG/DL (ref 70–110)

## 2020-08-28 PROCEDURE — 29826 SHO ARTHRS SRG DECOMPRESSION: CPT | Mod: RT,,, | Performed by: ORTHOPAEDIC SURGERY

## 2020-08-28 PROCEDURE — 27201423 OPTIME MED/SURG SUP & DEVICES STERILE SUPPLY: Performed by: ORTHOPAEDIC SURGERY

## 2020-08-28 PROCEDURE — 29823 PR SHLDR ARTHROSCOP,EXTEN DEBRIDE: ICD-10-PCS | Mod: 51,RT,, | Performed by: ORTHOPAEDIC SURGERY

## 2020-08-28 PROCEDURE — 71000033 HC RECOVERY, INTIAL HOUR: Performed by: ORTHOPAEDIC SURGERY

## 2020-08-28 PROCEDURE — 29828 PR ARTHROSCOPY SHOULDER SURGICAL BICEPS TENODESIS: ICD-10-PCS | Mod: 51,RT,, | Performed by: ORTHOPAEDIC SURGERY

## 2020-08-28 PROCEDURE — 76942 PR U/S GUIDANCE FOR NEEDLE GUIDANCE: ICD-10-PCS | Mod: 26,,, | Performed by: ANESTHESIOLOGY

## 2020-08-28 PROCEDURE — 29826 PR SHLDR ARTHROSCOP,PART ACROMIOPLAS: ICD-10-PCS | Mod: RT,,, | Performed by: ORTHOPAEDIC SURGERY

## 2020-08-28 PROCEDURE — 64416 NJX AA&/STRD BRCH PL NFS IMG: CPT | Performed by: UROLOGY

## 2020-08-28 PROCEDURE — 63600175 PHARM REV CODE 636 W HCPCS: Performed by: NURSE ANESTHETIST, CERTIFIED REGISTERED

## 2020-08-28 PROCEDURE — 25000003 PHARM REV CODE 250: Performed by: STUDENT IN AN ORGANIZED HEALTH CARE EDUCATION/TRAINING PROGRAM

## 2020-08-28 PROCEDURE — 36000711: Performed by: ORTHOPAEDIC SURGERY

## 2020-08-28 PROCEDURE — 25000003 PHARM REV CODE 250: Performed by: NURSE ANESTHETIST, CERTIFIED REGISTERED

## 2020-08-28 PROCEDURE — 82962 GLUCOSE BLOOD TEST: CPT | Performed by: ORTHOPAEDIC SURGERY

## 2020-08-28 PROCEDURE — 71000039 HC RECOVERY, EACH ADD'L HOUR: Performed by: ORTHOPAEDIC SURGERY

## 2020-08-28 PROCEDURE — 29827 PR SHLDR ARTHROSCOP,SURG,W/ROTAT CUFF REPR: ICD-10-PCS | Mod: 22,RT,, | Performed by: ORTHOPAEDIC SURGERY

## 2020-08-28 PROCEDURE — 37000009 HC ANESTHESIA EA ADD 15 MINS: Performed by: ORTHOPAEDIC SURGERY

## 2020-08-28 PROCEDURE — 76942 ECHO GUIDE FOR BIOPSY: CPT | Mod: 26,,, | Performed by: ANESTHESIOLOGY

## 2020-08-28 PROCEDURE — 29827 SHO ARTHRS SRG RT8TR CUF RPR: CPT | Mod: 22,RT,, | Performed by: ORTHOPAEDIC SURGERY

## 2020-08-28 PROCEDURE — 71000015 HC POSTOP RECOV 1ST HR: Performed by: ORTHOPAEDIC SURGERY

## 2020-08-28 PROCEDURE — 25000003 PHARM REV CODE 250: Performed by: ANESTHESIOLOGY

## 2020-08-28 PROCEDURE — 29828 SHO ARTHRS SRG BICP TENODSIS: CPT | Mod: 51,RT,, | Performed by: ORTHOPAEDIC SURGERY

## 2020-08-28 PROCEDURE — 64416 PR NERVE BLOCK INJ, ANES/STEROID, BRACHIAL PLEXUS, CONT INFUSION, INCL IMAG GUIDANCE: ICD-10-PCS | Mod: 59,RT,, | Performed by: ANESTHESIOLOGY

## 2020-08-28 PROCEDURE — 63600175 PHARM REV CODE 636 W HCPCS: Performed by: PHYSICIAN ASSISTANT

## 2020-08-28 PROCEDURE — 76942 ECHO GUIDE FOR BIOPSY: CPT | Performed by: UROLOGY

## 2020-08-28 PROCEDURE — D9220A PRA ANESTHESIA: Mod: ANES,,, | Performed by: ANESTHESIOLOGY

## 2020-08-28 PROCEDURE — D9220A PRA ANESTHESIA: ICD-10-PCS | Mod: ANES,,, | Performed by: ANESTHESIOLOGY

## 2020-08-28 PROCEDURE — 29823 SHO ARTHRS SRG XTNSV DBRDMT: CPT | Mod: 51,RT,, | Performed by: ORTHOPAEDIC SURGERY

## 2020-08-28 PROCEDURE — 63600175 PHARM REV CODE 636 W HCPCS: Performed by: ORTHOPAEDIC SURGERY

## 2020-08-28 PROCEDURE — 25000003 PHARM REV CODE 250: Performed by: PHYSICIAN ASSISTANT

## 2020-08-28 PROCEDURE — D9220A PRA ANESTHESIA: ICD-10-PCS | Mod: CRNA,,, | Performed by: NURSE ANESTHETIST, CERTIFIED REGISTERED

## 2020-08-28 PROCEDURE — 25000003 PHARM REV CODE 250: Performed by: UROLOGY

## 2020-08-28 PROCEDURE — 94761 N-INVAS EAR/PLS OXIMETRY MLT: CPT

## 2020-08-28 PROCEDURE — D9220A PRA ANESTHESIA: Mod: CRNA,,, | Performed by: NURSE ANESTHETIST, CERTIFIED REGISTERED

## 2020-08-28 PROCEDURE — 99900035 HC TECH TIME PER 15 MIN (STAT)

## 2020-08-28 PROCEDURE — 64416 NJX AA&/STRD BRCH PL NFS IMG: CPT | Mod: 59,RT,, | Performed by: ANESTHESIOLOGY

## 2020-08-28 PROCEDURE — 63600175 PHARM REV CODE 636 W HCPCS: Performed by: STUDENT IN AN ORGANIZED HEALTH CARE EDUCATION/TRAINING PROGRAM

## 2020-08-28 PROCEDURE — 36000710: Performed by: ORTHOPAEDIC SURGERY

## 2020-08-28 PROCEDURE — 37000008 HC ANESTHESIA 1ST 15 MINUTES: Performed by: ORTHOPAEDIC SURGERY

## 2020-08-28 PROCEDURE — 94770 HC EXHALED C02 TEST: CPT

## 2020-08-28 PROCEDURE — 63600175 PHARM REV CODE 636 W HCPCS: Performed by: UROLOGY

## 2020-08-28 PROCEDURE — C1713 ANCHOR/SCREW BN/BN,TIS/BN: HCPCS | Performed by: ORTHOPAEDIC SURGERY

## 2020-08-28 DEVICE — ANCHOR SU BC CORKSCREW 5.5MM: Type: IMPLANTABLE DEVICE | Site: SHOULDER | Status: FUNCTIONAL

## 2020-08-28 DEVICE — ANCHOR SUT BIOCOM 5.5X19.1MM: Type: IMPLANTABLE DEVICE | Site: SHOULDER | Status: FUNCTIONAL

## 2020-08-28 DEVICE — ANCHOR BIOCOMP SWVLLOK: Type: IMPLANTABLE DEVICE | Site: SHOULDER | Status: FUNCTIONAL

## 2020-08-28 RX ORDER — PROPOFOL 10 MG/ML
VIAL (ML) INTRAVENOUS
Status: DISCONTINUED | OUTPATIENT
Start: 2020-08-28 | End: 2020-08-28

## 2020-08-28 RX ORDER — EPINEPHRINE 1 MG/ML
INJECTION, SOLUTION INTRACARDIAC; INTRAMUSCULAR; INTRAVENOUS; SUBCUTANEOUS
Status: DISCONTINUED | OUTPATIENT
Start: 2020-08-28 | End: 2020-08-28 | Stop reason: HOSPADM

## 2020-08-28 RX ORDER — FENTANYL CITRATE 50 UG/ML
25 INJECTION, SOLUTION INTRAMUSCULAR; INTRAVENOUS EVERY 5 MIN PRN
Status: DISCONTINUED | OUTPATIENT
Start: 2020-08-28 | End: 2020-08-28 | Stop reason: HOSPADM

## 2020-08-28 RX ORDER — ONDANSETRON 2 MG/ML
INJECTION INTRAMUSCULAR; INTRAVENOUS
Status: DISCONTINUED | OUTPATIENT
Start: 2020-08-28 | End: 2020-08-28

## 2020-08-28 RX ORDER — ACETAMINOPHEN 500 MG
1000 TABLET ORAL ONCE
Status: COMPLETED | OUTPATIENT
Start: 2020-08-28 | End: 2020-08-28

## 2020-08-28 RX ORDER — CELECOXIB 200 MG/1
400 CAPSULE ORAL ONCE
Status: COMPLETED | OUTPATIENT
Start: 2020-08-28 | End: 2020-08-28

## 2020-08-28 RX ORDER — KETAMINE HCL IN 0.9 % NACL 50 MG/5 ML
SYRINGE (ML) INTRAVENOUS
Status: DISCONTINUED | OUTPATIENT
Start: 2020-08-28 | End: 2020-08-28

## 2020-08-28 RX ORDER — MIDAZOLAM HYDROCHLORIDE 1 MG/ML
INJECTION, SOLUTION INTRAMUSCULAR; INTRAVENOUS
Status: DISCONTINUED | OUTPATIENT
Start: 2020-08-28 | End: 2020-08-28

## 2020-08-28 RX ORDER — OXYCODONE HYDROCHLORIDE 5 MG/1
5 TABLET ORAL
Status: DISCONTINUED | OUTPATIENT
Start: 2020-08-28 | End: 2020-08-28 | Stop reason: HOSPADM

## 2020-08-28 RX ORDER — ROPIVACAINE/EPI/CLONIDINE/KET 2.46-0.005
SYRINGE (ML) INJECTION ONCE
Status: COMPLETED | OUTPATIENT
Start: 2020-08-28 | End: 2020-08-28

## 2020-08-28 RX ORDER — ROCURONIUM BROMIDE 10 MG/ML
INJECTION, SOLUTION INTRAVENOUS
Status: DISCONTINUED | OUTPATIENT
Start: 2020-08-28 | End: 2020-08-28

## 2020-08-28 RX ORDER — METHOCARBAMOL 500 MG/1
1000 TABLET, FILM COATED ORAL ONCE
Status: COMPLETED | OUTPATIENT
Start: 2020-08-28 | End: 2020-08-28

## 2020-08-28 RX ORDER — LIDOCAINE HYDROCHLORIDE 20 MG/ML
INJECTION INTRAVENOUS
Status: DISCONTINUED | OUTPATIENT
Start: 2020-08-28 | End: 2020-08-28

## 2020-08-28 RX ORDER — PHENYLEPHRINE HYDROCHLORIDE 10 MG/ML
INJECTION INTRAVENOUS
Status: DISCONTINUED | OUTPATIENT
Start: 2020-08-28 | End: 2020-08-28

## 2020-08-28 RX ORDER — DEXAMETHASONE SODIUM PHOSPHATE 4 MG/ML
INJECTION, SOLUTION INTRA-ARTICULAR; INTRALESIONAL; INTRAMUSCULAR; INTRAVENOUS; SOFT TISSUE
Status: DISCONTINUED | OUTPATIENT
Start: 2020-08-28 | End: 2020-08-28

## 2020-08-28 RX ORDER — SODIUM CHLORIDE 9 MG/ML
INJECTION, SOLUTION INTRAVENOUS CONTINUOUS
Status: DISCONTINUED | OUTPATIENT
Start: 2020-08-28 | End: 2020-08-28 | Stop reason: HOSPADM

## 2020-08-28 RX ORDER — FENTANYL CITRATE 50 UG/ML
INJECTION, SOLUTION INTRAMUSCULAR; INTRAVENOUS
Status: DISCONTINUED | OUTPATIENT
Start: 2020-08-28 | End: 2020-08-28

## 2020-08-28 RX ORDER — LIDOCAINE HYDROCHLORIDE AND EPINEPHRINE 15; 5 MG/ML; UG/ML
INJECTION, SOLUTION EPIDURAL
Status: DISCONTINUED | OUTPATIENT
Start: 2020-08-28 | End: 2020-08-28

## 2020-08-28 RX ORDER — MIDAZOLAM HYDROCHLORIDE 1 MG/ML
0.5 INJECTION INTRAMUSCULAR; INTRAVENOUS
Status: DISCONTINUED | OUTPATIENT
Start: 2020-08-28 | End: 2022-01-20

## 2020-08-28 RX ORDER — ROPIVACAINE HYDROCHLORIDE 5 MG/ML
INJECTION, SOLUTION EPIDURAL; INFILTRATION; PERINEURAL
Status: DISCONTINUED | OUTPATIENT
Start: 2020-08-28 | End: 2020-08-28

## 2020-08-28 RX ORDER — FENTANYL CITRATE 50 UG/ML
25 INJECTION, SOLUTION INTRAMUSCULAR; INTRAVENOUS EVERY 5 MIN PRN
Status: DISCONTINUED | OUTPATIENT
Start: 2020-08-28 | End: 2022-01-20

## 2020-08-28 RX ORDER — CEFAZOLIN SODIUM 1 G/3ML
2 INJECTION, POWDER, FOR SOLUTION INTRAMUSCULAR; INTRAVENOUS
Status: DISCONTINUED | OUTPATIENT
Start: 2020-08-28 | End: 2020-08-28 | Stop reason: HOSPADM

## 2020-08-28 RX ADMIN — LIDOCAINE HYDROCHLORIDE,EPINEPHRINE BITARTRATE 3 ML: 15; .005 INJECTION, SOLUTION EPIDURAL; INFILTRATION; INTRACAUDAL; PERINEURAL at 09:08

## 2020-08-28 RX ADMIN — PHENYLEPHRINE HYDROCHLORIDE 100 MCG: 10 INJECTION INTRAVENOUS at 10:08

## 2020-08-28 RX ADMIN — ROPIVACAINE HYDROCHLORIDE: 2 INJECTION, SOLUTION EPIDURAL; INFILTRATION at 12:08

## 2020-08-28 RX ADMIN — FENTANYL CITRATE 100 MCG: 50 INJECTION, SOLUTION INTRAMUSCULAR; INTRAVENOUS at 09:08

## 2020-08-28 RX ADMIN — PHENYLEPHRINE HYDROCHLORIDE 200 MCG: 10 INJECTION INTRAVENOUS at 10:08

## 2020-08-28 RX ADMIN — MIDAZOLAM HYDROCHLORIDE 2 MG: 1 INJECTION, SOLUTION INTRAMUSCULAR; INTRAVENOUS at 09:08

## 2020-08-28 RX ADMIN — ROPIVACAINE HYDROCHLORIDE 10 ML: 5 INJECTION, SOLUTION EPIDURAL; INFILTRATION; PERINEURAL at 09:08

## 2020-08-28 RX ADMIN — DEXAMETHASONE SODIUM PHOSPHATE 8 MG: 4 INJECTION, SOLUTION INTRAMUSCULAR; INTRAVENOUS at 10:08

## 2020-08-28 RX ADMIN — ONDANSETRON 4 MG: 2 INJECTION, SOLUTION INTRAMUSCULAR; INTRAVENOUS at 10:08

## 2020-08-28 RX ADMIN — SODIUM CHLORIDE, SODIUM GLUCONATE, SODIUM ACETATE, POTASSIUM CHLORIDE, MAGNESIUM CHLORIDE, SODIUM PHOSPHATE, DIBASIC, AND POTASSIUM PHOSPHATE: .53; .5; .37; .037; .03; .012; .00082 INJECTION, SOLUTION INTRAVENOUS at 11:08

## 2020-08-28 RX ADMIN — ROCURONIUM BROMIDE 50 MG: 10 INJECTION, SOLUTION INTRAVENOUS at 09:08

## 2020-08-28 RX ADMIN — SODIUM CHLORIDE: 0.9 INJECTION, SOLUTION INTRAVENOUS at 08:08

## 2020-08-28 RX ADMIN — PHENYLEPHRINE HYDROCHLORIDE 100 MCG: 10 INJECTION INTRAVENOUS at 09:08

## 2020-08-28 RX ADMIN — Medication 10 MG: at 11:08

## 2020-08-28 RX ADMIN — FENTANYL CITRATE 50 MCG: 50 INJECTION INTRAMUSCULAR; INTRAVENOUS at 08:08

## 2020-08-28 RX ADMIN — CEFAZOLIN 2 G: 330 INJECTION, POWDER, FOR SOLUTION INTRAMUSCULAR; INTRAVENOUS at 09:08

## 2020-08-28 RX ADMIN — ACETAMINOPHEN 1000 MG: 500 TABLET ORAL at 08:08

## 2020-08-28 RX ADMIN — METHOCARBAMOL TABLETS 1000 MG: 500 TABLET, COATED ORAL at 01:08

## 2020-08-28 RX ADMIN — Medication 20 MG: at 09:08

## 2020-08-28 RX ADMIN — PHENYLEPHRINE HYDROCHLORIDE 100 MCG: 10 INJECTION INTRAVENOUS at 11:08

## 2020-08-28 RX ADMIN — ROCURONIUM BROMIDE 10 MG: 10 INJECTION, SOLUTION INTRAVENOUS at 10:08

## 2020-08-28 RX ADMIN — SODIUM CHLORIDE, SODIUM GLUCONATE, SODIUM ACETATE, POTASSIUM CHLORIDE, MAGNESIUM CHLORIDE, SODIUM PHOSPHATE, DIBASIC, AND POTASSIUM PHOSPHATE: .53; .5; .37; .037; .03; .012; .00082 INJECTION, SOLUTION INTRAVENOUS at 10:08

## 2020-08-28 RX ADMIN — LIDOCAINE HYDROCHLORIDE 75 MG: 20 INJECTION, SOLUTION INTRAVENOUS at 09:08

## 2020-08-28 RX ADMIN — PROPOFOL 150 MG: 10 INJECTION, EMULSION INTRAVENOUS at 09:08

## 2020-08-28 RX ADMIN — Medication 10 MG: at 10:08

## 2020-08-28 RX ADMIN — MIDAZOLAM HYDROCHLORIDE 1 MG: 1 INJECTION, SOLUTION INTRAMUSCULAR; INTRAVENOUS at 08:08

## 2020-08-28 RX ADMIN — Medication 50 ML: at 08:08

## 2020-08-28 RX ADMIN — CELECOXIB 400 MG: 200 CAPSULE ORAL at 08:08

## 2020-08-28 RX ADMIN — OXYCODONE 5 MG: 5 TABLET ORAL at 01:08

## 2020-08-28 RX ADMIN — ROCURONIUM BROMIDE 10 MG: 10 INJECTION, SOLUTION INTRAVENOUS at 11:08

## 2020-08-28 NOTE — ANESTHESIA PROCEDURE NOTES
Intubation  Performed by: Nhan Solares CRNA  Authorized by: Shiva Urban MD     Intubation:     Induction:  Intravenous    Intubated:  Postinduction    Mask Ventilation:  Easy mask    Attempts:  2    Attempted By:  CRNA    Method of Intubation:  Direct    Blade:  Watson 2    Laryngeal View Grade: Grade III - only epiglottis visible      Attempted By (2nd Attempt):  CRNA    Method of Intubation (2nd Attempt):  Video laryngoscopy    Blade (2nd Attempt):  Sang 3    Laryngeal View Grade (2nd Attempt): Grade IIa - cords partially seen      Difficult Airway Encountered?: No      Complications:  None    Airway Device:  Oral endotracheal tube    Airway Device Size:  7.5    Style/Cuff Inflation:  Cuffed    Inflation Amount (mL):  6    Tube secured:  21    Secured at:  The lips    Placement Verified By:  Capnometry    Complicating Factors:  Anterior larynx    Findings Post-Intubation:  BS equal bilateral

## 2020-08-28 NOTE — DISCHARGE INSTRUCTIONS
Discharge Instructions for Open Rotator Cuff Repair  You had a procedure called open rotator cuff repair. The rotator cuff consists of the muscles and tendons that surround your shoulder. The rotator cuff keeps the top of your upper arm bone (humerus) securely in the shoulder joint. Your doctor made an incision near your shoulder blade and repaired the torn muscles or tendons in your shoulder. Here are instructions to follow when caring for your arm at home.  Activity        · After surgery, rest your arm and relax for the rest of day.  · If you had general anesthesia (were put to sleep for the procedure), dont operate power tools or machinery, drink alcohol, or make any major decisions for at least 24 hours after surgery.  · Wear your sling, brace, or immobilizer, as directed.  · Dont drive a car until your doctor says its OK. And never drive while taking opioid pain medicine.  · Flex your wrist and wiggle your fingers often to help blood flow.  POLAR CARE CUBE COLD THERAPY SYSTEM    The Polar Care Cube Cold Therapy System is simple and reliable. It is easy to use, compact design makes it great for home use. With the addition of ice and water, you will enjoy 6-8 hours of effortless cold therapy. Proper use requires an insulation barrier between the pad and the patient's skin.  Instructions on how to use the Polar Care Cube Cold Therapy System Below:      Shoulder Immobilizer  A shoulder immobilizer is designed to hold your arm against your body. It is used for injuries where you need to limit how much you can move your shoulder. These injuries include a shoulder dislocation or shoulder fracture where moving your shoulder too much could cause harm.  You should use the immobilizer all the time or as instructed by your doctor. This will prevent other people from accidentally pulling on your arm. It also keeps you from sleeping in an incorrect position or moving the shoulder into a position that might make your  injury worse.  A joint that is immobilized too long can become stiff and lose range of motion. Follow-up with your doctor as advised. Dont use the shoulder immobilizer longer than you are told to. Ask if you should move your elbow, wrist, and hand.  Home use  · Leave the shoulder immobilizer in place as long as your doctor tells you to. Unless your doctor tells you otherwise, you should sleep with it in place.  · If you are being treated for a shoulder dislocation, you may take the immobilizer off to bathe or dress. But while its off, dont try to raise your arm away from your body. Put the immobilizer back on as soon as possible.  · If you are being treated for a shoulder fracture, leave the immobilizer in place until your next exam.  · The shoulder immobilizer can be adjusted. If it becomes loose, adjust it so that your arm is snug against your body. Your forearm should be level with the ground (horizontal). Your hand should be level with your elbow.  Date Last Reviewed: 8/1/2016 © 2000-2017 [employer]. [address] [place] [address]. All rights reserved. This information is not intended as a substitute for professional medical care. Always follow your healthcare professional's instructions.      PATIENT INSTRUCTIONS  POST-ANESTHESIA    IMMEDIATELY FOLLOWING SURGERY:  Do not drive or operate machinery for the first twenty four hours after surgery.  Do not make any important decisions for twenty four hours after surgery or while taking narcotic pain medications or sedatives.  If you develop intractable nausea and vomiting or a severe headache please notify your doctor immediately.    FOLLOW-UP:  Please make an appointment with your surgeon as instructed. You do not need to follow up with anesthesia unless specifically instructed to do so.    WOUND CARE INSTRUCTIONS (if applicable):  Keep a dry clean dressing on the anesthesia/puncture wound site if there is drainage.  Once the wound has  quit draining you may leave it open to air.  Generally you should leave the bandage intact for twenty four hours unless there is drainage.  If the epidural site drains for more than 36-48 hours please call the anesthesia department.    QUESTIONS?:  Please feel free to call your physician or the hospital  if you have any questions, and they will be happy to assist you.       [facility] Anesthesia Department  [address] [place]  [phone]        Incision care  · Check your incision daily for redness, tenderness, or drainage.  · Dont soak in a bathtub, hot tub, or pool until your doctor says its OK.  · Wait several days after your surgery to start showering, or until your doctor says it's OK. Then shower as needed. Carefully wash your incision with soap and water. Gently pat it dry. Dont rub the incision, or apply creams or lotions.  · Your incision was closed using sutures, staples, or strips of tape. If you have sutures or staples, they may need to be removed up to 2 to 3 weeks after surgery. Allow the strips of tape to fall off on their own.  Home care  · Use pain medicine as directed by your doctor.  · Apply an ice pack or bag of frozen peas--or something similar--wrapped in a thin towel on your shoulder to reduce swelling for the first 48 hours. Leave the ice pack on for 20 minutes; then take it off for 20 minutes. Repeat as needed.  · Take your temperature daily for 7 days after your surgery. Report a fever above 100.4°F (38°C)  to your doctor. Fever may be a sign of infection.  Follow-up care  Follow up with your healthcare provider, or as advised.      When to call your healthcare provider  Call 911 right away if you have any of the following:  · Chest pain  · Shortness of breath  Otherwise, call your healthcare provider right away if you have any of these:  · Increasing shoulder pain or pain not relieved by medicine  · Pain or swelling in the arm on the side of your  surgery  · Numbness, tingling, coolness, or blue-gray color of your arm or fingers on the side of your surgery  · Fever above 100.4°F (38°C), or as advised  · Shaking chills  · Drainage or oozing, redness, or warmth at the incision  · Nausea or vomiting   Date Last Reviewed: 7/1/2016  © 7028-3814 [employer]. [address] [place] [address], [place] [address]. All rights reserved. This information is not intended as a substitute for professional medical care. Always follow your healthcare professional's instructions.

## 2020-08-28 NOTE — INTERVAL H&P NOTE
The patient has been examined and the H&P has been reviewed:    I concur with the findings and no changes have occurred since H&P was written.    Surgery risks, benefits and alternative options discussed and understood by patient/family.          Active Hospital Problems    Diagnosis  POA    Traumatic complete tear of right rotator cuff [S46.011A]  Yes      Resolved Hospital Problems   No resolved problems to display.

## 2020-08-28 NOTE — ANESTHESIA PREPROCEDURE EVALUATION
08/28/2020    Pre-operative evaluation for Procedure(s) (LRB):  REPAIR, ROTATOR CUFF, ARTHROSCOPIC (Right)  FIXATION, TENDON-BICEPS TENODESIS (Right)  ARTHROSCOPY, SHOULDER, WITH DISTAL CLAVICLE EXCISION (Right)    Calderon Burt Jr. is a 69 y.o. male     Patient Active Problem List   Diagnosis    Numbness and tingling of left upper and lower extremity    Essential hypertension    Type 2 diabetes mellitus, without long-term current use of insulin    Mixed hyperlipidemia    Acute thalamic infarction    Stroke       Review of patient's allergies indicates:  No Known Allergies    No current facility-administered medications on file prior to encounter.      Current Outpatient Medications on File Prior to Encounter   Medication Sig Dispense Refill    aspirin (ECOTRIN) 81 MG EC tablet Take 1 tablet (81 mg total) by mouth 2 (two) times daily. for 14 days 28 tablet 0    atorvastatin (LIPITOR) 40 MG tablet Take 1 tablet (40 mg total) by mouth once daily. 90 tablet 0    benazepril (LOTENSIN) 20 MG tablet Take 20 mg by mouth once daily.      bimatoprost (LUMIGAN) 0.01 % Drop 1 drop every evening.      brimonidine-timolol (COMBIGAN) 0.2-0.5 % Drop Place 1 drop into both eyes.      diclofenac sodium (VOLTAREN) 1 % Gel Apply 2 g topically once daily. 100 g 0    metFORMIN (GLUMETZA) 1000 MG (MOD) 24 hr tablet Take 1,000 mg by mouth 2 (two) times daily with meals.      psyllium (METAMUCIL) powder Take 1 packet by mouth once daily. Pt takes about twice a week      tamsulosin (FLOMAX) 0.4 mg Cap Take 0.4 mg by mouth once daily.      temazepam (RESTORIL) 7.5 MG Cap Take 15 mg by mouth nightly as needed.         Past Surgical History:   Procedure Laterality Date    CARPAL TUNNEL RELEASE      right/left    CATARACT EXTRACTION, BILATERAL      HEMORRHOID SURGERY      SHOULDER OPEN ROTATOR CUFF REPAIR      left     WRIST SURGERY      right/left       Social History     Socioeconomic History    Marital status:      Spouse name: Not on file    Number of children: Not on file    Years of education: Not on file    Highest education level: Not on file   Occupational History    Not on file   Social Needs    Financial resource strain: Not on file    Food insecurity     Worry: Not on file     Inability: Not on file    Transportation needs     Medical: Not on file     Non-medical: Not on file   Tobacco Use    Smoking status: Former Smoker    Smokeless tobacco: Never Used   Substance and Sexual Activity    Alcohol use: No    Drug use: No    Sexual activity: Yes   Lifestyle    Physical activity     Days per week: Not on file     Minutes per session: Not on file    Stress: Not on file   Relationships    Social connections     Talks on phone: Not on file     Gets together: Not on file     Attends Hoahaoism service: Not on file     Active member of club or organization: Not on file     Attends meetings of clubs or organizations: Not on file     Relationship status: Not on file   Other Topics Concern    Not on file   Social History Narrative    Not on file       EKG:  Normal sinus rhythm   Normal ECG     2D Echo:  No results found for this or any previous visit.     TTE:  · Concentric left ventricular remodeling.  · Normal left ventricular systolic function. The estimated ejection fraction is 57%  · Grade I (mild) left ventricular diastolic dysfunction consistent with impaired relaxation.  · No wall motion abnormalities.  · Normal right ventricular systolic function.  · Mild mitral regurgitation.      Anesthesia Evaluation    I have reviewed the Patient Summary Reports.    I have reviewed the Nursing Notes. I have reviewed the NPO Status.      Review of Systems  Anesthesia Hx:  Denies Family Hx of Anesthesia complications.   Denies Personal Hx of Anesthesia complications.   Cardiovascular:   Hypertension Denies  CABG/stent.  Denies Dysrhythmias.   Denies Angina.        Pulmonary:   Denies COPD.  Denies Asthma.  Denies Recent URI.    Renal/:   Denies Chronic Renal Disease.     Hepatic/GI:   Denies GERD.    Neurological:   CVA Denies Seizures.    Endocrine:   Diabetes, type 2, using insulin    Psych:   Denies Psychiatric History.          Physical Exam  General:  Well nourished    Airway/Jaw/Neck:  Airway Findings: Mouth Opening: Normal Tongue: Normal  General Airway Assessment: Adult  Mallampati: III  Improves to II with phonation.  TM Distance: Normal, at least 6 cm  Jaw/Neck Findings:  Neck ROM: Normal ROM      Dental:  Dental Findings: In tact   Chest/Lungs:  Chest/Lungs Findings: Clear to auscultation, Normal Respiratory Rate     Heart/Vascular:  Heart Findings: Rate: Normal  Rhythm: Regular Rhythm        Mental Status:  Mental Status Findings:  Cooperative, Alert and Oriented         Anesthesia Plan  Type of Anesthesia, risks & benefits discussed:  Anesthesia Type:  general, regional  Patient's Preference:   Intra-op Monitoring Plan: standard ASA monitors  Intra-op Monitoring Plan Comments:   Post Op Pain Control Plan: multimodal analgesia and per primary service following discharge from PACU  Post Op Pain Control Plan Comments:   Induction:   IV  Beta Blocker:  Patient is not currently on a Beta-Blocker (No further documentation required).       Informed Consent: Patient understands risks and agrees with Anesthesia plan.  Questions answered. Anesthesia consent signed with patient.  ASA Score: 2     Day of Surgery Review of History & Physical:    H&P update referred to the surgeon.         Ready For Surgery From Anesthesia Perspective.

## 2020-08-28 NOTE — TRANSFER OF CARE
Anesthesia Transfer of Care Note    Patient: Calderon Burt Jr.    Procedure(s) Performed: Procedure(s) (LRB):  REPAIR, ROTATOR CUFF, ARTHROSCOPIC (Right)  FIXATION, TENDON-BICEPS TENODESIS (Right)    Patient location: PACU    Anesthesia Type: general    Transport from OR: Transported from OR on 6-10 L/min O2 by face mask with adequate spontaneous ventilation    Post pain: adequate analgesia    Post assessment: no apparent anesthetic complications and tolerated procedure well    Post vital signs: stable    Level of consciousness: awake, alert and oriented    Nausea/Vomiting: no nausea/vomiting    Complications: none    Transfer of care protocol was followed      Last vitals:   Visit Vitals  BP (!) 143/81 (BP Location: Left arm, Patient Position: Lying)   Pulse 75   Temp 36.7 °C (98 °F) (Oral)   Resp 19   Ht 6' (1.829 m)   Wt 85.7 kg (189 lb)   SpO2 100%   BMI 25.63 kg/m²

## 2020-08-28 NOTE — PLAN OF CARE
VSS.  Patient tolerating oral liquids without difficulty.   No apparent s&s of distress noted at this time, no complaints voiced at this time.   Discharge instructions reviewed with patient/girlfriend with good verbal feedback received. Polar ice, sling, and onQ ball in place and infusing. Has post op meds.  Patient ready for discharge.

## 2020-08-28 NOTE — BRIEF OP NOTE
[facility]  Brief Operative Note    Surgery Date: 8/28/2020     Surgeon(s) and Role:     * CHENCHO Reyes MD - Primary    Assisting Surgeon: None    Pre-op Diagnosis:  Traumatic tear of right rotator cuff, unspecified tear extent, initial encounter [S46.011A]  Biceps tendinitis of right upper extremity [M75.21]  Arthritis of right acromioclavicular joint [M19.011]    Post-op Diagnosis:  Post-Op Diagnosis Codes:     * Traumatic tear of right rotator cuff, unspecified tear extent, initial encounter [S46.011A]     * Biceps tendinitis of right upper extremity [M75.21]     * Arthritis of right acromioclavicular joint [M19.011]    Procedure(s) (LRB):  REPAIR, ROTATOR CUFF, ARTHROSCOPIC (Right)  FIXATION, TENDON-BICEPS TENODESIS (Right)  ARTHROSCOPY, SHOULDER, WITH DISTAL CLAVICLE EXCISION (Right)    Anesthesia: General    Description of the findings of the procedure(s): Traumatic tear of right rotator cuff, unspecified tear extent, initial encounter [S46.011A]  Biceps tendinitis of right upper extremity [M75.21]  Arthritis of right acromioclavicular joint [M19.011]    Estimated Blood Loss: * No values recorded between 8/28/2020  9:56 AM and 8/28/2020 12:16 PM *         Specimens:   Specimen (12h ago, onward)    None            Discharge Note    OUTCOME: Patient tolerated treatment/procedure well without complication and is now ready for discharge.    DISPOSITION: Home or Self Care    FINAL DIAGNOSIS: Traumatic tear of right rotator cuff, unspecified tear extent, initial encounter [S46.011A]  Biceps tendinitis of right upper extremity [M75.21]  Arthritis of right acromioclavicular joint [M19.011]    FOLLOWUP: In clinic    DISCHARGE INSTRUCTIONS:  No discharge procedures on file.

## 2020-08-28 NOTE — PLAN OF CARE
AAOX3, on stretcher in semi abernathy position. No obvious signs of distress noted. Locker 21. Will continue to monitor.

## 2020-08-28 NOTE — OP NOTE
[facility]   OPERATIVE REPORT   ORTHOPAEDIC SURGERY   PROVIDER: DR. LEANDRA DORSEY    PATIENT INFORMATION   Calderon Burt Jr. 69 y.o. male 1950   MRN: [id number]   LOCATION: [facility]     DATE OF PROCEDURE: 8/28/2020     PREOPERATIVE DIAGNOSES:   Right  1. Shoulder rotator cuff tear, acute on chronic massive  2. Shoulder biceps tendinopathy with medial subluxation    POSTOPERATIVE DIAGNOSES:   Right  1. Shoulder rotator cuff tear, acute on chronic massive (supraspinatus, subscapularis)  2. Shoulder biceps tendinopathy with medial subluxation  3. Shoulder subacromial spurring with bursitis  4. Shoulder synovitis  5. Shoulder labral degenerative tearing     OPERATION:   Right  1. Shoulder arthroscopic rotator cuff repair, 6 anchor (CPT 12741, complex modifier-22)  2. Shoulder arthroscopic biceps tenodesis (CPT 89649)  3. Shoulder arthroscopic extensive debridement (anterior, posterior glenohumeral joint, subacromial space) (CPT 17436)    4. Shoulder arthroscopic subacromial decompression (CPT 54510)      COMPLEX PROCEDURE:    This was a massive acute on chronic rotator cuff tear with near complete full-thickness disruption of the subscapularis with tissue retraction.  The entire supraspinatus also was torn completely.  Given the size of the tear with multi tendon involvement this required a complex repair.  A double row trans osseous equivalent construct was utilized for the supraspinatus tear which extended into the anterior aspect of the infraspinatus.  A 2 anchor knotless technique was utilized to repair the subscapularis.  Additional time in the operating room and anchor placement was required in this case given the complexity of the procedure.    Surgeon(s) and Role:     * CHENCHO Dorsey MD - Primary     * Vasquez Slater MD - Fellow     * Td Bronson, LANNY    ANESTHESIA: General with interscalene block    ESTIMATED BLOOD LOSS: 15 cc    IMPLANTS:   Implant Name Type Inv. Item Serial  No.  Lot No. LRB No. Used Action   ANCHOR BIOCOMP SWVLLOK - [id number]  ANCHOR BIOCOMP SWVLLOK  [employer] [id number] Right 2 Implanted   ANCHOR CHANDRA BC CORKSCREW 5.5MM - [id number]  ANCHOR CHANDRA BC CORKSCREW 5.5MM  [employer] [id number] Right 2 Implanted   ANCHOR SUT BIOCOM 5.5X19.1MM - [id number]  ANCHOR SUT BIOCOM 5.5X19.1MM  [employer] [id number] Right 1 Implanted   ANCHOR SUT BIOCOM 5.5X19.1MM - [id number]  ANCHOR SUT BIOCOM 5.5X19.1MM  [employer] [id number] Right 1 Wasted   ANCHOR SUT BIOCOM 5.5X19.1MM - [id number]  ANCHOR SUT BIOCOM 5.5X19.1MM  [employer] [id number] Right 1 Implanted      SPECIMENS:   Specimen (12h ago, onward)    None        COMPLICATIONS: None.     INTRAOPERATIVE COUNTS: Correct.     PROPHYLACTIC IV ANTIBIOTICS: Given per [facility] Protocol.    INDICATIONS FOR PROCEDURE:   Calderon Burt Jr. 69 y.o. male who presented to me recently after a traumatic injury to his right shoulder while bowling.  The patient was found to have a full-thickness tear of the subscapularis and supraspinatus tendons with tissue retraction.  The patient has been indicated for arthroscopic repair.  The details of surgery were discussed to include the expected postop rehab and recovery course.  Full informed consent was obtained prior to proceeding.    DETAILS OF PROCEDURE:  After the correct operative site was marked by the operating surgeon, an interscalene block was administered by the anesthesia team.  The patient was then taken to the operating room and placed supine on the operating room table, where the patient underwent general anesthesia by the anesthesia team.  The patient was then rolled into the lateral decubitus position with the operative side up.  A well-padded axillary roll, beanbag and pillows were placed.  All pressure points were carefully padded and checked.  The upper extremities and both lower extremities were placed in comfortable positions and were also well-padded.      A verbal timeout was confirmed to identify the  patient, operative site and planned operative procedure. It was also confirmed the patient had received preoperative IV antibiotic per protocol.     Examination under anesthesia demonstrated: Forward elevation 180 degrees, external rotation with arm to side 80 degrees; grade 1 anterior and grade 0 posterior load and shift.    The operative upper extremity was then prepped and draped in the usual sterile fashion.     The Spider arm positioner was implemented with balanced suspension and appropriate landmarks were noted on the skin.  A posterior followed by jd-superior portals were created and systematic examination of the joint revealed the following:      -The biceps long head tendon was partially torn over its intra-articular portion and medially subluxated out of its groove.  -Diffuse synovitis from anterior to posterior with degenerative labral free edge tearing.  -The subscapularis was completely torn involving almost its entire width.  The lower portion along with the IGHL the humeral attachments remained intact.  The tissue was retracted to just lateral to the glenoid margin.  Tissue quality was good.  -The entire supraspinatus also was torn and retracted medially.  This tear extended into the anterior aspect of the infraspinatus.  This was a massive rotator cuff tear with tissue retraction.  -No loose bodies  -Mild superficial delamination of the glenoid articular cartilage with a small area of grade 3 wear over the center aspect, 5 x 5 mm, otherwise no glenohumeral focal chondral defects    A shaver was again brought in to clear the field of view and to debride torn labrum and undersurface cuff from anterior to posterior. Extensive synovitis was also removed. Cautery was used to resect the interval to the coracoid and to release anterior to the subscapularis and also on the posterior articular side as well for circumferential releases.  Care was taken to maintain the interval attachment between the  subscapularis upper border and the anterior edge of the supraspinatus for rotator interval release in continuity in this case.     The biceps was tagged with a Fiberlink suture to create a luggage tag configuration and released with Metzenbaum scissors.     Attention was then turned towards repair of the subscapularis.  A 70 degree arthroscope was utilized in this case for proper visualization of the lesser tuberosity footprint.  This was debrided and gently decorticated with a matt, shaver, and ball rasp for preparation. Two Fibertape sutures were then passed in horizontal mattress fashion - the first over the lower to mid portion of the torn subscapularis, the second over the mid to upper 1/3 portion.  This provided excellent control over the torn subscapularis.  After adequate circumferential releases were performed for tissue mobilization, these FiberTape sutures were dunked into separate 4.75 mm knotless Swivelock anchors at the lesser tuberosity footprint for subscapularis repair.  The repair was slightly medialized over the upper border.  The Fiberlink suture which was used to suture the biceps long head tendon was also incorporated into the inferior knotless anchor for arthroscopic biceps tenodesis.  Excellent subscapularis tendon tissue reapposition to the footprint was achieved with this repair.  Probing was used to demonstrate good repair stability.  A posterior push-pull maneuver demonstrated no lift-off after the repair.  With the rotator interval release in continuity, the supraspinatus tissue was partially reduced with the subscapularis repair.  The arthroscopic shaver was introduced to remove all loose debris and also to partially release the now visible MGHL to limit postoperative stiffness.     Attention was then turned to the subacromial space where significant hypertrophic bursa was encountered. Through an anterolateral working portal, shaver and cautery devices were introduced to clear the  subacromial space of bursa and adhesions. Bursal reflections to the deltoid fascia anteriorly and posteriorly were taken down to further expand this space. This created a nice room with a view. The undersurface of the acromion was exposed with cautery to delineate bony anatomy. Systematic examination of this space revealed the following:    -Subacromial spurring with narrowing of the anterolateral subacromial interval  -Fraying and degeneration of the CA ligament indicative of a component of chronic outlet impingement  -The superior rotator cuff portion of the tear was visualized.  This was a large tear involving the entire supraspinatus extending up to the anterior aspect of the infraspinatus.    Bursal sided releases were performed down to the scapular spine. The tuberosity was prepared with the shaver through accessory posterolateral and posterior portals while looking from the standard anterolateral portal.  The large sized, medially retracted tear was then repaired with Suturetapes originating from two double loaded 5.5 mm Swivelock medial row anchors. The tapes were passed in horizontal mattress fashion. The medial row was then tied for a total of 4 knots. These suture limbs were then taken laterally to two 5.5 mm Swivelock anchors to complete the transosseous equivalent double row  repair configuration. The cuff was repaired to its native position on the greater tuberosity without excessive tension. Following repair, probing of the repair site revealed good tissue apposition to the footprint and good construct stability.    Subacromial decompression was completed using posterior cutting block technique in the standard fashion with a 4.5 mm matt without difficulty.  The anterior osteophyte was flattened converting the acromion morphology from a type 3 to a type 1.  Confirmation of adequate resection was confirmed while viewing from the lateral portal and referencing from the posterior acromial  undersurface.    The shoulder and subacromial space were then irrigated and fluid was extravasated using suction.     All portals were reapproximated using 3-0 Nylon. Absorbant mepilex pads were placed to cover all incisions.  A polar care shoulder sleeve was secured followed by a  sling with abduction pillow. The patient was then repositioned supine, extubated and taken to the recovery room where the patient arrived in stable condition with the compartments of the arm and forearm soft and good perfusion in all digits.     POSTOPERATIVE PLAN OF CARE:  -Patient will be discharged home according to protocol.  -Physical Therapy: Follow the > 3 cm cuff repair rehab protocol. PROM can start in 2 weeks. AROM starts at 6-7 weeks. No cuff resistive activity x 12 weeks. No biceps resistive activity x 8 weeks.  -DVT prophylaxis: ASA 81 mg twice a day x 2 weeks.

## 2020-08-28 NOTE — ANESTHESIA PROCEDURE NOTES
Right Interscalene Catheter    Patient location during procedure: pre-op   Block not for primary anesthetic.  Reason for block: at surgeon's request and post-op pain management   Post-op Pain Location: Right Shoulder Pain  Start time: 8/28/2020 8:50 AM  Timeout: 8/28/2020 8:50 AM   End time: 8/28/2020 9:05 AM    Staffing  Authorizing Provider: Shiva Urban MD  Performing Provider: Juan Emanuel MD    Preanesthetic Checklist  Completed: patient identified, site marked, surgical consent, pre-op evaluation, timeout performed, IV checked, risks and benefits discussed and monitors and equipment checked  Peripheral Block  Patient position: sitting  Prep: ChloraPrep and site prepped and draped  Patient monitoring: heart rate, cardiac monitor, continuous pulse ox, continuous capnometry and frequent blood pressure checks  Block type: interscalene  Laterality: right  Injection technique: continuous  Needle  Needle type: Tuohy   Needle gauge: 18 G  Needle length: 2 in  Needle localization: anatomical landmarks and ultrasound guidance  Catheter type: non-stimulating  Catheter size: 20 G  Test dose: lidocaine 1.5% with Epi 1-to-200,000 and negative   -ultrasound image captured on disc.  Assessment  Injection assessment: negative aspiration, negative parasthesia and local visualized surrounding nerve  Paresthesia pain: none  Heart rate change: no  Slow fractionated injection: yes  Additional Notes  VSS.  [facility] RN monitoring vitals throughout procedure.  Patient tolerated procedure well.

## 2020-08-29 NOTE — ANESTHESIA POST-OP PAIN MANAGEMENT
Acute Pain Service Progress Note      08/29/2020    Called and spoke to patient about OnQ PNC.  Pain well controlled.  Denies tinnitus, metallic taste in mouth, weakness in extremity.  Denies erythema, warmth, tenderness, bleeding at site of catheter entry.  Dressing c/d/i.  All questions answered.  Encouraged them to call the provided number if any issues arise.  Will call again tomorrow.      Lanre Jaimes MD   Resident Physician  Anesthesiology   [facility]

## 2020-08-31 ENCOUNTER — CLINICAL SUPPORT (OUTPATIENT)
Dept: REHABILITATION | Facility: HOSPITAL | Age: 70
End: 2020-08-31
Payer: MEDICARE

## 2020-08-31 DIAGNOSIS — S46.011A TRAUMATIC COMPLETE TEAR OF RIGHT ROTATOR CUFF, INITIAL ENCOUNTER: ICD-10-CM

## 2020-08-31 PROCEDURE — 97161 PT EVAL LOW COMPLEX 20 MIN: CPT

## 2020-08-31 PROCEDURE — 97110 THERAPEUTIC EXERCISES: CPT

## 2020-08-31 NOTE — ADDENDUM NOTE
Addendum  created 08/31/20 1158 by Domenico Beal RN    Clinical Note Signed, Intraprocedure Event edited

## 2020-08-31 NOTE — ANESTHESIA POSTPROCEDURE EVALUATION
Anesthesia Post Evaluation    Patient: Calderon Burt JrMike    Procedure(s) Performed: Procedure(s) (LRB):  REPAIR, ROTATOR CUFF, ARTHROSCOPIC (Right)  FIXATION, TENDON-BICEPS TENODESIS (Right)    Final Anesthesia Type: general    Patient location during evaluation: PACU  Patient participation: Yes- Able to Participate  Level of consciousness: awake and alert and oriented  Post-procedure vital signs: reviewed and stable  Pain management: adequate  Airway patency: patent    PONV status at discharge: No PONV  Anesthetic complications: no      Cardiovascular status: blood pressure returned to baseline and hemodynamically stable  Respiratory status: unassisted, room air and spontaneous ventilation  Hydration status: euvolemic  Follow-up not needed.          Vitals Value Taken Time   /94 08/28/20 1430   Temp 36.6 °C (97.8 °F) 08/28/20 1254   Pulse 67 08/28/20 1434   Resp 22 08/28/20 1434   SpO2 98 % 08/28/20 1434   Vitals shown include unvalidated device data.      Event Time   Out of Recovery 14:25:00         Pain/Chevy Score: No data recorded

## 2020-08-31 NOTE — PROGRESS NOTES
8/31/2020 1156    Called and spoke with patient's friend and caregiver, Ms. Weston. Reported patient's On-Q pump was removed yesterday without difficulty. Stated that blue tip to end of catheter remained intact upon removal. Denies any concerns at this time.

## 2020-08-31 NOTE — PLAN OF CARE
[facility]  Physical Therapy Initial Evaluation    Date: 8/31/2020   Name: Calderon Burt Jr.  Clinic Number: [id number]    Therapy Diagnosis:   Encounter Diagnosis   Name Primary?    Traumatic complete tear of right rotator cuff, initial encounter      Physician: Ben Escoto PA*    Physician Orders: PT Eval and Treat   Medical Diagnosis from Referral:   Diagnosis   S46.011A (ICD-10-CM) - Traumatic tear of right rotator cuff, unspecified tear extent, initial encounter   M75.21 (ICD-10-CM) - Biceps tendinitis of right upper extremity   M19.011 (ICD-10-CM) - Arthritis of right acromioclavicular joint       OPERATION:   Right  1. Shoulder arthroscopic rotator cuff repair, 6 anchor (CPT 39115, complex modifier-22)  2. Shoulder arthroscopic biceps tenodesis (CPT 57332)  3. Shoulder arthroscopic extensive debridement (anterior, posterior glenohumeral joint, subacromial space) (CPT 23663)    4. Shoulder arthroscopic subacromial decompression (CPT 76134)    Evaluation Date: 8/31/2020  Authorization Period Expiration: 08/20/2021  Plan of Care Expiration: 03/20/2021  Visit # / Visits authorized: 1/ 1    Time In: 1110  Time Out: 1200  Total Appointment Time (timed & untimed codes): 45 minutes    Precautions: Standard and High blood pressure  -Physical Therapy: Follow the > 3 cm cuff repair rehab protocol. PROM can start in 2 weeks. AROM starts at 6-7 weeks. No cuff resistive activity x 12 weeks. No biceps resistive activity x 8 weeks.  Subjective   Date of onset: 08/28/2020  History of current condition - Calderon reports: Initially hurt shoulder bowling ~1 month ago.      Pain:  Current 7/10, worst 9/10, best 5/10   Location: Right shoulder  Description: Aching  Aggravating Factors: Constant  Easing Factors: ice and rest    Pts goals: Resume exercise and riding motorcycles.     Medical History:   Past Medical History:   Diagnosis Date    Cataract     Diabetes mellitus     Glaucoma      High cholesterol     Hypertension     Tendonitis        Surgical History:   Calderon Burt Jr.  has a past surgical history that includes Wrist surgery; Shoulder open rotator cuff repair; Carpal tunnel release; Hemorrhoid surgery; Cataract extraction, bilateral; Arthroscopic repair of rotator cuff of shoulder (Right, 8/28/2020); and Fixation of tendon (Right, 8/28/2020).    Medications:   Calderon has a current medication list which includes the following prescription(s): aspirin, atorvastatin, benazepril, bimatoprost, brimonidine-timolol, metformin, ondansetron, oxycodone, psyllium, tamsulosin, and temazepam, and the following Facility-Administered Medications: fentanyl, midazolam, and ropivacaine.    Allergies:   Review of patient's allergies indicates:  No Known Allergies       Prior Therapy: No  Exercise Routine/Sports Participation: Yes, independent  Social History: Lives home with girl friend  Occupation: Retired  Prior Level of Function: Full  Current Level of Function: Unable to complete ADLs.    Objective     Observation: Incisions covered with padded bandages. No overt signs of infection.    Posture: Increased thoracic kyphosis, pt in shoulder sling with abduction pillow    Passive Range of Motion:   No PROM or AROM due to post op precuations    Upper Extremity Strength:  Formal MMT not performed 2/2 POD#3 and increased pain.      Palpation:  Positive warmth and swelling.  as expected post-op.    Sensation: Intact but diminished 2/2 residual nerve block.      Limitation/Restriction for FOTO - Survey    Therapist reviewed FOTO scores for Calderon Burt Jr. on 8/31/2020.   FOTO documents entered into [employer] - see Media section.    Limitation Score: -%         TREATMENT   Treatment Time In: 1140  Treatment Time Out: 1200  Total Treatment time (time-based codes) separate from Evaluation: 20 minutes    Calderon received therapeutic exercises to develop strength, endurance, ROM and flexibility for 20 minutes  "including:  - strength 5" hold x 50  -Wrist flexion and extension in sling 5" x 20  -Shoulder shrugs x 20  Time includes education    Education provided:   - Importance of post op precautions    Written Home Exercises Provided: yes.  Exercises were reviewed and Calderon was able to demonstrate them prior to the end of the session.  Calderon demonstrated good  understanding of the education provided.     See EMR under Patient Instructions for exercises provided 8/31/2020.    Assessment   Calderon is a 69 y.o. male referred to outpatient Physical Therapy with a medical diagnosis of s/p R massive rotator cuff repair. Pt presents with post op pain and swelling as expected for post op day 3. Unable to assess ROM or strength due to post op precaution. Will assess at 2 week post op iraida. Progress will be made conservatively due to the patient age and degree of severity     Pt prognosis is Good.   Pt will benefit from skilled outpatient Physical Therapy to address the deficits stated above and in the chart below, provide pt/family education, and to maximize pt's level of independence.     Plan of care discussed with patient: Yes  Pt's spiritual, cultural and educational needs considered and patient is agreeable to the plan of care and goals as stated below:     Anticipated Barriers for therapy: Severity of tear    Medical Necessity is demonstrated by the following  History  Co-morbidities and personal factors that may impact the plan of care Co-morbidities:   advanced age    Personal Factors:   no deficits     low   Examination  Body Structures and Functions, activity limitations and participation restrictions that may impact the plan of care Body Regions:   upper extremities    Body Systems:    gross symmetry  ROM  strength  gross coordinated movement    Participation Restrictions:   Unable to exercise    Activity limitations:   Learning and applying knowledge  No deficits    General Tasks and Commands  {No " deficits    Communication  No deficits    Mobility  lifting and carrying objects    Self care  No deficits    Domestic Life  No deficits    Interactions/Relationships  No deficits    Life Areas  NA    Community and Social Life  No deficits         high   Clinical Presentation evolving clinical presentation with changing clinical characteristics moderate   Decision Making/ Complexity Score: low     GOALS: Short Term Goals:  6 weeks  1.Report decreased shoulder pain < / =  2/10  to increase tolerance for exercise  2. Assess shoulder ROM at 2 weeks post op and establish goals   3. Increased strength by 1/3 MMT grade in gross shoulder girdle to increase tolerance for ADL and work activities.  4. Pt to tolerate HEP to improve ROM and independence with ADL's    Long Term Goals: 16 weeks  1.Report decreased shouler pain  < / =  0 /10  to increase tolerance for ADLs  2.Increase AROM to equal the contralateral limb  3.Increase strength to >/= 4/5 in right shoulder to increase tolerance for ADL and work activities.  4. Pt goal: Resume exercise program  5. Pt will have improved gcode of CJ (20-40% limited) on FOTO shoulder in order to demonstrate true functional improvement.      Plan   Plan of care Certification: 8/31/2020 to 03/21/2020    Outpatient Physical Therapy 1 times weekly for 8 weeks to include the following interventions: manual therapy, therapeutic exercise, therapeutic activities, and neuromuscular re-education. Progress to 2 session a week following 8 week post op iraida.     Abhijeet Suarez, PT, DPT

## 2020-09-15 ENCOUNTER — CLINICAL SUPPORT (OUTPATIENT)
Dept: REHABILITATION | Facility: HOSPITAL | Age: 70
End: 2020-09-15
Payer: MEDICARE

## 2020-09-15 DIAGNOSIS — M25.511 ACUTE PAIN OF RIGHT SHOULDER: ICD-10-CM

## 2020-09-15 PROCEDURE — 97110 THERAPEUTIC EXERCISES: CPT

## 2020-09-15 NOTE — PROGRESS NOTES
"  Physical Therapy Daily Treatment Note     Name: Calderon Burt Jr.  Clinic Number: [id number]    Therapy Diagnosis: No diagnosis found.  Physician: Ben Escoto PA*    Visit Date: 9/15/2020  Medical Diagnosis from Referral:   Diagnosis   S46.011A (ICD-10-CM) - Traumatic tear of right rotator cuff, unspecified tear extent, initial encounter   M75.21 (ICD-10-CM) - Biceps tendinitis of right upper extremity   M19.011 (ICD-10-CM) - Arthritis of right acromioclavicular joint         OPERATION:   Right  1. Shoulder arthroscopic rotator cuff repair, 6 anchor (CPT 28704, complex modifier-22)  2. Shoulder arthroscopic biceps tenodesis (CPT 96596)  3. Shoulder arthroscopic extensive debridement (anterior, posterior glenohumeral joint, subacromial space) (CPT 95691)    4. Shoulder arthroscopic subacromial decompression (CPT 68154)    Evaluation Date: 8/31/2020  Authorization Period Expiration: 08/20/2021  Plan of Care Expiration: 03/20/2021  Visit # / Visits authorized: 2/ 20     Time In: 1430  Time Out: 1515  Total Billable Time: 45 minutes    Precautions: Standard and High blood pressure  -Physical Therapy: Follow the > 3 cm cuff repair rehab protocol. PROM can start in 2 weeks. AROM starts at 6-7 weeks. No cuff resistive activity x 12 weeks. No biceps resistive activity x 8 weeks.    Subjective     Pt reports: he is compliant with his HEP.  He was compliant with home exercise program.  Response to previous treatment: Decreased pain  Functional change: NA    Pain: 2/10  Location: right shoulder      Objective     Passive Range of Motion:   Shoulder Right Left   Flexion 60 -   Abduction 30 -   ER at 0 0 -   ER at 90 NA -   IR 30 -        Calderon received therapeutic exercises to develop strength, endurance, ROM and flexibility for 45 minutes including:  - strength 5" hold x 50  -Wrist flexion and extension in sling 5" x 20  -Shoulder shrugs x 20  -Table bows for flexion AAROM  -Pendulums " CW/CCW/ABD/ADD/FLEX/EXT  -PROM within protocol limits for shoulder flexion, elbow flexion/ext      Home Exercises Provided and Patient Education Provided     Education provided:   - Importance of post op precautions  -Added pendulums and table bows to HEP     Written Home Exercises Provided: yes.  Exercises were reviewed and Calderon was able to demonstrate them prior to the end of the session.  Calderon demonstrated good  understanding of the education provided.      See EMR under Patient Instructions for exercises provided 8/31/2020.    Assessment     Flexion PROM is appropriate for this stage of rehabilitation. Will continue to progress as protocol allows. Elbow mobility is limited and have to be addressed. Patient presents with sling worn improperly. Adjusted sling to keep shoulder in the appropriate position.    Calderon is progressing well towards his goals.   Pt prognosis is Fair.     Pt will continue to benefit from skilled outpatient physical therapy to address the deficits listed in the problem list box on initial evaluation, provide pt/family education and to maximize pt's level of independence in the home and community environment.     Pt's spiritual, cultural and educational needs considered and pt agreeable to plan of care and goals.    Anticipated barriers to physical therapy: Severity of tear       GOALS: Short Term Goals:  6 weeks  1.Report decreased shoulder pain < / =  2/10  to increase tolerance for exercise  2. Assess shoulder ROM at 2 weeks post op and establish goals   3. Increased strength by 1/3 MMT grade in gross shoulder girdle to increase tolerance for ADL and work activities.  4. Pt to tolerate HEP to improve ROM and independence with ADL's     Long Term Goals: 16 weeks  1.Report decreased shouler pain  < / =  0 /10  to increase tolerance for ADLs  2.Increase AROM to equal the contralateral limb  3.Increase strength to >/= 4/5 in right shoulder to increase tolerance for ADL and work  activities.  4. Pt goal: Resume exercise program  5. Pt will have improved gcode of CJ (20-40% limited) on FOTO shoulder in order to demonstrate true functional improvement.    Plan   Plan of care Certification: 8/31/2020 to 03/21/2020     Outpatient Physical Therapy 1 times weekly for 8 weeks to include the following interventions: manual therapy, therapeutic exercise, therapeutic activities, and neuromuscular re-education. Progress to 2 session a week following 8 week post op iraida.     Abhijeet Suarez, PT

## 2020-09-17 ENCOUNTER — OFFICE VISIT (OUTPATIENT)
Dept: SPORTS MEDICINE | Facility: CLINIC | Age: 70
End: 2020-09-17
Payer: MEDICARE

## 2020-09-17 VITALS
HEART RATE: 101 BPM | DIASTOLIC BLOOD PRESSURE: 95 MMHG | BODY MASS INDEX: 25.33 KG/M2 | SYSTOLIC BLOOD PRESSURE: 161 MMHG | HEIGHT: 72 IN | WEIGHT: 187 LBS

## 2020-09-17 DIAGNOSIS — M75.21 BICEPS TENDINITIS OF RIGHT UPPER EXTREMITY: ICD-10-CM

## 2020-09-17 DIAGNOSIS — Z09 SURGERY FOLLOW-UP EXAMINATION: ICD-10-CM

## 2020-09-17 DIAGNOSIS — S46.011A TRAUMATIC TEAR OF RIGHT ROTATOR CUFF, UNSPECIFIED TEAR EXTENT, INITIAL ENCOUNTER: Primary | ICD-10-CM

## 2020-09-17 PROCEDURE — 99213 OFFICE O/P EST LOW 20 MIN: CPT | Mod: PBBFAC | Performed by: PHYSICIAN ASSISTANT

## 2020-09-17 PROCEDURE — 99024 POSTOP FOLLOW-UP VISIT: CPT | Mod: POP,,, | Performed by: PHYSICIAN ASSISTANT

## 2020-09-17 PROCEDURE — 99999 PR PBB SHADOW E&M-EST. PATIENT-LVL III: ICD-10-PCS | Mod: PBBFAC,,, | Performed by: PHYSICIAN ASSISTANT

## 2020-09-17 PROCEDURE — 99999 PR PBB SHADOW E&M-EST. PATIENT-LVL III: CPT | Mod: PBBFAC,,, | Performed by: PHYSICIAN ASSISTANT

## 2020-09-17 PROCEDURE — 99024 PR POST-OP FOLLOW-UP VISIT: ICD-10-PCS | Mod: POP,,, | Performed by: PHYSICIAN ASSISTANT

## 2020-09-17 NOTE — PROGRESS NOTES
S:Calderon Burt Jr. presents for post-operative evaluation.     DATE OF PROCEDURE: 8/28/2020       OPERATION:   Right  1. Shoulder arthroscopic rotator cuff repair, 6 anchor (CPT 38360, complex modifier-22)  2. Shoulder arthroscopic biceps tenodesis (CPT 96067)  3. Shoulder arthroscopic extensive debridement (anterior, posterior glenohumeral joint, subacromial space) (CPT 42207)    4. Shoulder arthroscopic subacromial decompression (CPT 61307)        Surgeon(s) and Role:     * CHENCHO Reyes MD - Primary     * Vasquez Slater MD - Fellow     * Td Bronson, LANNY Burt Jr. reports to be doing well 2wk s/p the above mentioned procedure. Denies fevers, chills, night sweats, chest pain, difficulty breathing, calf pain or tenderness. Going to PT [facility] at the [facility]. Seeing good progress daily. Pain levels are improving. Has d/c'd pain medication.  He states that he has been wearing his sling and performing his exercises as instructed.  Overall, he states that he feels great and does not have any pain in clinic today.    O: The incisions are healing well.  No signs of infection.  Sutures were removed. No significant pain or unusual tenderness.    A/P:  Continue use of sling.  Finish aspirin prescription.  Continue use of Tylenol for pain.  Plan to follow the rehab plan as previously outlined. RTC in 4 weeks with Dr. Reyes for 6 week post op visit.     POSTOPERATIVE PLAN OF CARE:  -Patient will be discharged home according to protocol.  -Physical Therapy: Follow the > 3 cm cuff repair rehab protocol. PROM can start in 2 weeks. AROM starts at 6-7 weeks. No cuff resistive activity x 12 weeks. No biceps resistive activity x 8 weeks.  -DVT prophylaxis: ASA 81 mg twice a day x 2 weeks.

## 2020-09-24 ENCOUNTER — CLINICAL SUPPORT (OUTPATIENT)
Dept: REHABILITATION | Facility: HOSPITAL | Age: 70
End: 2020-09-24
Payer: MEDICARE

## 2020-09-24 DIAGNOSIS — M25.511 ACUTE PAIN OF RIGHT SHOULDER: ICD-10-CM

## 2020-09-24 PROCEDURE — 97110 THERAPEUTIC EXERCISES: CPT

## 2020-09-24 NOTE — PROGRESS NOTES
Physical Therapy Daily Treatment Note     Name: Calderon Burt Jr.  Clinic Number: [id number]    Therapy Diagnosis:   Encounter Diagnosis   Name Primary?    Acute pain of right shoulder      Physician: Ben Escoto PA*    Visit Date: 9/24/2020  Medical Diagnosis from Referral:   Diagnosis   S46.011A (ICD-10-CM) - Traumatic tear of right rotator cuff, unspecified tear extent, initial encounter   M75.21 (ICD-10-CM) - Biceps tendinitis of right upper extremity   M19.011 (ICD-10-CM) - Arthritis of right acromioclavicular joint         OPERATION:   Right  1. Shoulder arthroscopic rotator cuff repair, 6 anchor (CPT 77647, complex modifier-22)  2. Shoulder arthroscopic biceps tenodesis (CPT 07610)  3. Shoulder arthroscopic extensive debridement (anterior, posterior glenohumeral joint, subacromial space) (CPT 72385)    4. Shoulder arthroscopic subacromial decompression (CPT 06970)    Evaluation Date: 8/31/2020  Authorization Period Expiration: 08/20/2021  Plan of Care Expiration: 03/20/2021  Visit # / Visits authorized: 3/ 20     Time In: 1145  Time Out: 1230  Total Billable Time: 45 minutes    Precautions: Standard and High blood pressure  -Physical Therapy: Follow the > 3 cm cuff repair rehab protocol. PROM can start in 2 weeks. AROM starts at 6-7 weeks. No cuff resistive activity x 12 weeks. No biceps resistive activity x 8 weeks.    Subjective     Pt reports: he is compliant with his HEP.  He was compliant with home exercise program.  Response to previous treatment: Decreased pain  Functional change: NA    Pain: 2/10  Location: right shoulder      Objective     Passive Range of Motion:   Shoulder Right Left   Flexion 85 -   Abduction 30 -   ER at 0 0 -   ER at 90 NA -   IR 30 -        Calderon received therapeutic exercises to develop strength, endurance, ROM and flexibility for 45 minutes including:  -Shoulder shrugs x 20  -Swiss ball flexion AAROM x 40  -Pendulums CW/CCW/ABD/ADD/FLEX/EXT  -PROM within protocol  limits for shoulder flexion, elbow flexion/ext  -Scapular retraction with manual cues x 30  -Table slides to 90 degrees x 30      Home Exercises Provided and Patient Education Provided     Education provided:   - Importance of post op precautions  -Added pendulums and table bows to HEP     Written Home Exercises Provided: yes.  Exercises were reviewed and Calderon was able to demonstrate them prior to the end of the session.  Calderon demonstrated good  understanding of the education provided.      See EMR under Patient Instructions for exercises provided 8/31/2020.    Assessment     Patient continues to progress well within the confines of his protocol. He continues to focus on his HEP and is not having any issues.      Calderon is progressing well towards his goals.   Pt prognosis is Fair.     Pt will continue to benefit from skilled outpatient physical therapy to address the deficits listed in the problem list box on initial evaluation, provide pt/family education and to maximize pt's level of independence in the home and community environment.     Pt's spiritual, cultural and educational needs considered and pt agreeable to plan of care and goals.    Anticipated barriers to physical therapy: Severity of tear       GOALS: Short Term Goals:  6 weeks  1.Report decreased shoulder pain < / =  2/10  to increase tolerance for exercise  2. Assess shoulder ROM at 2 weeks post op and establish goals   3. Increased strength by 1/3 MMT grade in gross shoulder girdle to increase tolerance for ADL and work activities.  4. Pt to tolerate HEP to improve ROM and independence with ADL's     Long Term Goals: 16 weeks  1.Report decreased shouler pain  < / =  0 /10  to increase tolerance for ADLs  2.Increase AROM to equal the contralateral limb  3.Increase strength to >/= 4/5 in right shoulder to increase tolerance for ADL and work activities.  4. Pt goal: Resume exercise program  5. Pt will have improved gcode of CJ (20-40% limited) on  FOTO shoulder in order to demonstrate true functional improvement.    Plan   Plan of care Certification: 8/31/2020 to 03/21/2020     Outpatient Physical Therapy 1 times weekly for 8 weeks to include the following interventions: manual therapy, therapeutic exercise, therapeutic activities, and neuromuscular re-education. Progress to 2 session a week following 8 week post op iraida.     Abhijeet Suarez, PT, DPT

## 2020-09-28 ENCOUNTER — CLINICAL SUPPORT (OUTPATIENT)
Dept: REHABILITATION | Facility: HOSPITAL | Age: 70
End: 2020-09-28
Payer: MEDICARE

## 2020-09-28 DIAGNOSIS — M25.511 ACUTE PAIN OF RIGHT SHOULDER: ICD-10-CM

## 2020-09-28 PROCEDURE — 97110 THERAPEUTIC EXERCISES: CPT

## 2020-09-28 NOTE — PROGRESS NOTES
"  Physical Therapy Daily Treatment Note     Name: Calderon Burt Jr.  Clinic Number: [id number]    Therapy Diagnosis:   Encounter Diagnosis   Name Primary?    Acute pain of right shoulder      Physician: Ben Escoto PA*    Visit Date: 9/28/2020  Medical Diagnosis from Referral:   Diagnosis   S46.011A (ICD-10-CM) - Traumatic tear of right rotator cuff, unspecified tear extent, initial encounter   M75.21 (ICD-10-CM) - Biceps tendinitis of right upper extremity   M19.011 (ICD-10-CM) - Arthritis of right acromioclavicular joint         OPERATION:   Right  1. Shoulder arthroscopic rotator cuff repair, 6 anchor (CPT 49671, complex modifier-22)  2. Shoulder arthroscopic biceps tenodesis (CPT 61986)  3. Shoulder arthroscopic extensive debridement (anterior, posterior glenohumeral joint, subacromial space) (CPT 87716)    4. Shoulder arthroscopic subacromial decompression (CPT 17287)    Evaluation Date: 8/31/2020  Authorization Period Expiration: 08/20/2021  Plan of Care Expiration: 03/20/2021  Visit # / Visits authorized: 4/ 20     Time In: 0945  Time Out: 1030  Total Billable Time: 45 minutes    Precautions: Standard and High blood pressure  -Physical Therapy: Follow the > 3 cm cuff repair rehab protocol. PROM can start in 2 weeks. AROM starts at 6-7 weeks. No cuff resistive activity x 12 weeks. No biceps resistive activity x 8 weeks.    Subjective     Pt reports: No reports of shoulder pain.  He was compliant with home exercise program.  Response to previous treatment: Decreased pain  Functional change: NA    Pain: 0/10  Location: right shoulder      Objective   DOS: 8/28/2020    Days post op: 4 weeks 2 days      Passive Range of Motion:   Shoulder Right Left   Flexion 85 -   Abduction 30 -   ER at 0 20 -   ER at 90 NA -   IR 30 -        Calderon received therapeutic exercises to develop strength, endurance, ROM and flexibility for 45 minutes including:  -Shoulder shrugs x 30 w/ 5" hold  -Swiss ball flexion AAROM x " "40  -Pendulums CW/CCW/ABD/ADD/FLEX/EXT  -PROM within protocol limits for shoulder flexion, elbow flexion/ext  -Scapular retraction with manual cues x 30, 5" hold  -Table slides to 90 degrees x 30      Home Exercises Provided and Patient Education Provided     Education provided:   - Importance of post op precautions  -Added pendulums and table bows to HEP     Written Home Exercises Provided: yes.  Exercises were reviewed and Calderon was able to demonstrate them prior to the end of the session.  Calderon demonstrated good  understanding of the education provided.      See EMR under Patient Instructions for exercises provided 8/31/2020.    Assessment     Progressing well. Will continue slow ROM progression as protocols allows.    Calderon is progressing well towards his goals.   Pt prognosis is Fair.     Pt will continue to benefit from skilled outpatient physical therapy to address the deficits listed in the problem list box on initial evaluation, provide pt/family education and to maximize pt's level of independence in the home and community environment.     Pt's spiritual, cultural and educational needs considered and pt agreeable to plan of care and goals.    Anticipated barriers to physical therapy: Severity of tear       GOALS: Short Term Goals:  6 weeks  1.Report decreased shoulder pain < / =  2/10  to increase tolerance for exercise  2. Assess shoulder ROM at 2 weeks post op and establish goals   3. Increased strength by 1/3 MMT grade in gross shoulder girdle to increase tolerance for ADL and work activities.  4. Pt to tolerate HEP to improve ROM and independence with ADL's     Long Term Goals: 16 weeks  1.Report decreased shouler pain  < / =  0 /10  to increase tolerance for ADLs  2.Increase AROM to equal the contralateral limb  3.Increase strength to >/= 4/5 in right shoulder to increase tolerance for ADL and work activities.  4. Pt goal: Resume exercise program  5. Pt will have improved gcode of CJ (20-40% " limited) on FOTO shoulder in order to demonstrate true functional improvement.    Plan   Plan of care Certification: 8/31/2020 to 03/21/2020     Outpatient Physical Therapy 1 times weekly for 8 weeks to include the following interventions: manual therapy, therapeutic exercise, therapeutic activities, and neuromuscular re-education. Progress to 2 session a week following 8 week post op iraida.     Abhijeet Suarez, PT, DPT

## 2020-10-05 ENCOUNTER — CLINICAL SUPPORT (OUTPATIENT)
Dept: REHABILITATION | Facility: HOSPITAL | Age: 70
End: 2020-10-05
Payer: MEDICARE

## 2020-10-05 DIAGNOSIS — M25.511 ACUTE PAIN OF RIGHT SHOULDER: ICD-10-CM

## 2020-10-05 PROCEDURE — 97110 THERAPEUTIC EXERCISES: CPT

## 2020-10-05 NOTE — PROGRESS NOTES
Physical Therapy Daily Treatment Note     Name: Calderon Burt Jr.  Clinic Number: [id number]    Therapy Diagnosis:   Encounter Diagnosis   Name Primary?    Acute pain of right shoulder      Physician: Ben Escoto PA*    Visit Date: 10/5/2020  Medical Diagnosis from Referral:   Diagnosis   S46.011A (ICD-10-CM) - Traumatic tear of right rotator cuff, unspecified tear extent, initial encounter   M75.21 (ICD-10-CM) - Biceps tendinitis of right upper extremity   M19.011 (ICD-10-CM) - Arthritis of right acromioclavicular joint         OPERATION:   Right  1. Shoulder arthroscopic rotator cuff repair, 6 anchor (CPT 88274, complex modifier-22)  2. Shoulder arthroscopic biceps tenodesis (CPT 38614)  3. Shoulder arthroscopic extensive debridement (anterior, posterior glenohumeral joint, subacromial space) (CPT 07295)    4. Shoulder arthroscopic subacromial decompression (CPT 54357)    Evaluation Date: 8/31/2020  Authorization Period Expiration: 08/20/2021  Plan of Care Expiration: 03/20/2021  Visit # / Visits authorized: 5/ 20     Time In: 0945  Time Out: 1030  Total Billable Time: 45 minutes    Precautions: Standard and High blood pressure  -Physical Therapy: Follow the > 3 cm cuff repair rehab protocol. PROM can start in 2 weeks. AROM starts at 6-7 weeks. No cuff resistive activity x 12 weeks. No biceps resistive activity x 8 weeks.    Subjective     Pt reports: he forgot his sling at home today. He also states he has not been wearing his sling to sleep at night.   He was compliant with home exercise program.  Response to previous treatment: Decreased pain  Functional change: NA    Pain: 0/10  Location: right shoulder      Objective   DOS: 8/28/2020    Days post op: 5 weeks 3 days      Passive Range of Motion:   Shoulder Right Left   Flexion 90 -   Abduction 45 -   ER at 0 40 -   ER at 90 NA -   IR 30 -        Calderon received therapeutic exercises to develop strength, endurance, ROM and flexibility for 45 minutes  "including:  -Shoulder shrugs x 30 w/ 5" hold  -Swiss ball flexion AAROM x 40  -Pendulums CW/CCW/ABD/ADD/FLEX/EXT  -PROM within protocol limits for shoulder flexion, elbow flexion/ext  -Scapular retraction with manual cues x 30, 5" hold  -Table slides to 90 degrees x 30      Home Exercises Provided and Patient Education Provided     Education provided:   - Importance of post op precautions, specifically sling use       Written Home Exercises Provided: yes.  Exercises were reviewed and Calderon was able to demonstrate them prior to the end of the session.  Calderon demonstrated good  understanding of the education provided.      See EMR under Patient Instructions for exercises provided 8/31/2020.    Assessment     Patient is non complaint with his post op precautions regarding his sling. Reinforced previous education on importance of following precautions especially due to the size of the repair. Patient verbally agrees to understanding and states he will adhere better to he precautions in the future. Otherwise he is looking very good for 5 weeks post large RTC repair.    Calderon is progressing well towards his goals.   Pt prognosis is Fair.     Pt will continue to benefit from skilled outpatient physical therapy to address the deficits listed in the problem list box on initial evaluation, provide pt/family education and to maximize pt's level of independence in the home and community environment.     Pt's spiritual, cultural and educational needs considered and pt agreeable to plan of care and goals.    Anticipated barriers to physical therapy: Severity of tear       GOALS: Short Term Goals:  6 weeks  1.Report decreased shoulder pain < / =  2/10  to increase tolerance for exercise  2. Assess shoulder ROM at 2 weeks post op and establish goals   3. Increased strength by 1/3 MMT grade in gross shoulder girdle to increase tolerance for ADL and work activities.  4. Pt to tolerate HEP to improve ROM and independence with " ADL's     Long Term Goals: 16 weeks  1.Report decreased shouler pain  < / =  0 /10  to increase tolerance for ADLs  2.Increase AROM to equal the contralateral limb  3.Increase strength to >/= 4/5 in right shoulder to increase tolerance for ADL and work activities.  4. Pt goal: Resume exercise program  5. Pt will have improved gcode of CJ (20-40% limited) on FOTO shoulder in order to demonstrate true functional improvement.    Plan   Plan of care Certification: 8/31/2020 to 03/21/2020     Outpatient Physical Therapy 1 times weekly for 8 weeks to include the following interventions: manual therapy, therapeutic exercise, therapeutic activities, and neuromuscular re-education. Progress to 2 session a week following 8 week post op iraida.     Abhijeet Suarez, PT, DPT

## 2020-10-12 ENCOUNTER — CLINICAL SUPPORT (OUTPATIENT)
Dept: REHABILITATION | Facility: HOSPITAL | Age: 70
End: 2020-10-12
Payer: MEDICARE

## 2020-10-12 DIAGNOSIS — M25.511 ACUTE PAIN OF RIGHT SHOULDER: ICD-10-CM

## 2020-10-12 PROCEDURE — 97110 THERAPEUTIC EXERCISES: CPT

## 2020-10-13 NOTE — PROGRESS NOTES
S:Calderon Burt Jr. presents for post-operative evaluation.     DATE OF PROCEDURE: 8/28/2020   OPERATION:   Right  1. Shoulder arthroscopic rotator cuff repair, 6 anchor (CPT 83453, complex modifier-22)  2. Shoulder arthroscopic biceps tenodesis (CPT 49570)  3. Shoulder arthroscopic extensive debridement (anterior, posterior glenohumeral joint, subacromial space) (CPT 59337)    4. Shoulder arthroscopic subacromial decompression (CPT 12974)        Calderon Burt Jr. reports to be doing well 7 wk s/p the above mentioned procedure. Going to PT 2xWeek at the [facility]. Seeing good progress daily. He states that he has been wearing his sling and performing his exercises as instructed.  Overall, he states that he feels good. No numbness or tingling.      O:  Exam of the right shoulder demonstrates well-healed incisions.  The biceps is well tensioned.  Passive forward elevation to 100°, external rotation with arm at side to 40°.     A/P:  Discussed rehab plan.  The patient may discontinue use of the abduction pillow.  Wean use of the sling after the next week or so.  May begin active range of motion in the next week or so.  Discussed avoidance of any heavy lifting.  No lifting more than a Coke can or small book.  This was a large rotator cuff tear.  We will need to take things a bit slower with him.  Return to clinic in 6 weeks.

## 2020-10-15 ENCOUNTER — OFFICE VISIT (OUTPATIENT)
Dept: SPORTS MEDICINE | Facility: CLINIC | Age: 70
End: 2020-10-15
Payer: MEDICARE

## 2020-10-15 VITALS
BODY MASS INDEX: 25.6 KG/M2 | DIASTOLIC BLOOD PRESSURE: 95 MMHG | HEART RATE: 95 BPM | WEIGHT: 189 LBS | HEIGHT: 72 IN | SYSTOLIC BLOOD PRESSURE: 146 MMHG

## 2020-10-15 DIAGNOSIS — Z09 SURGERY FOLLOW-UP EXAMINATION: Primary | ICD-10-CM

## 2020-10-15 PROCEDURE — 99024 POSTOP FOLLOW-UP VISIT: CPT | Mod: POP,,, | Performed by: ORTHOPAEDIC SURGERY

## 2020-10-15 PROCEDURE — 99999 PR PBB SHADOW E&M-EST. PATIENT-LVL III: CPT | Mod: PBBFAC,,, | Performed by: ORTHOPAEDIC SURGERY

## 2020-10-15 PROCEDURE — 99213 OFFICE O/P EST LOW 20 MIN: CPT | Mod: PBBFAC | Performed by: ORTHOPAEDIC SURGERY

## 2020-10-15 PROCEDURE — 99999 PR PBB SHADOW E&M-EST. PATIENT-LVL III: ICD-10-PCS | Mod: PBBFAC,,, | Performed by: ORTHOPAEDIC SURGERY

## 2020-10-15 PROCEDURE — 99024 PR POST-OP FOLLOW-UP VISIT: ICD-10-PCS | Mod: POP,,, | Performed by: ORTHOPAEDIC SURGERY

## 2020-10-15 RX ORDER — NAPROXEN 500 MG/1
500 TABLET ORAL 2 TIMES DAILY
Qty: 30 TABLET | Refills: 1 | Status: SHIPPED | OUTPATIENT
Start: 2020-10-15 | End: 2022-01-20

## 2020-10-18 RX ORDER — NAPROXEN 500 MG/1
500 TABLET ORAL 2 TIMES DAILY
Qty: 30 TABLET | Refills: 2 | Status: SHIPPED | OUTPATIENT
Start: 2020-10-18 | End: 2022-01-20

## 2020-10-19 ENCOUNTER — CLINICAL SUPPORT (OUTPATIENT)
Dept: REHABILITATION | Facility: HOSPITAL | Age: 70
End: 2020-10-19
Payer: MEDICARE

## 2020-10-19 DIAGNOSIS — M25.511 ACUTE PAIN OF RIGHT SHOULDER: ICD-10-CM

## 2020-10-19 PROCEDURE — 97110 THERAPEUTIC EXERCISES: CPT

## 2020-10-19 NOTE — PROGRESS NOTES
Physical Therapy Daily Treatment Note     Name: Calderon Burt Jr.  Clinic Number: [id number]    Therapy Diagnosis:   Encounter Diagnosis   Name Primary?    Acute pain of right shoulder      Physician: Ben Escoto PA*    Visit Date: 10/19/2020  Medical Diagnosis from Referral:   Diagnosis   S46.011A (ICD-10-CM) - Traumatic tear of right rotator cuff, unspecified tear extent, initial encounter   M75.21 (ICD-10-CM) - Biceps tendinitis of right upper extremity   M19.011 (ICD-10-CM) - Arthritis of right acromioclavicular joint         OPERATION:   Right  1. Shoulder arthroscopic rotator cuff repair, 6 anchor (CPT 44009, complex modifier-22)  2. Shoulder arthroscopic biceps tenodesis (CPT 64816)  3. Shoulder arthroscopic extensive debridement (anterior, posterior glenohumeral joint, subacromial space) (CPT 90273)    4. Shoulder arthroscopic subacromial decompression (CPT 46782)    Evaluation Date: 8/31/2020  Authorization Period Expiration: 08/20/2021  Plan of Care Expiration: 03/20/2021  Visit # / Visits authorized: 7/ 20     Time In: 0945  Time Out: 1030  Total Billable Time: 45 minutes    Precautions: Standard and High blood pressure  -Physical Therapy: Follow the > 3 cm cuff repair rehab protocol. PROM can start in 2 weeks. AROM starts at 6-7 weeks. No cuff resistive activity x 12 weeks. No biceps resistive activity x 8 weeks.    Subjective     Pt reports: he had follow up appt with MD last week and he said everything is looking good. He states he can take the pillow off of his sling now.     He was compliant with home exercise program.  Response to previous treatment: Decreased pain  Functional change: NA    Pain: 1/10  Location: right shoulder      Objective   DOS: 8/28/2020    Days post op: 7 weeks 3 days      Passive Range of Motion:   Shoulder Right Left   Flexion 90 -   Abduction 80 -   ER at 0 45 -   ER at 90 NA -   IR 30 -        Calderon received therapeutic exercises to develop strength, endurance,  "ROM and flexibility for 45 minutes including:  -LLLD stretch in seated 5'  -Swiss ball flexion AAROM x 40  -PROM within protocol limits for shoulder flexion, elbow flexion/ext  -Scapular retraction with manual cues x 30, 5" hold  -Table slides to 90 degrees x 40 flexion and ABD      Home Exercises Provided and Patient Education Provided     Education provided:   - Importance of post op precautions, continue AAROM for 1 more week        Written Home Exercises Provided: yes.  Exercises were reviewed and Calderon was able to demonstrate them prior to the end of the session.  Calderon demonstrated good  understanding of the education provided.      See EMR under Patient Instructions for exercises provided 8/31/2020.    Assessment   Calderon tolerated treatment well with continued focus on AAROM and activation of scapular musculature. Patient able to discontinue abduction pillow per MD starting today. Patient is progressing as expected, plan to initiate AROM beginning next week. Calderon is progressing well towards his goals.     Pt prognosis is Fair.     Pt will continue to benefit from skilled outpatient physical therapy to address the deficits listed in the problem list box on initial evaluation, provide pt/family education and to maximize pt's level of independence in the home and community environment.     Pt's spiritual, cultural and educational needs considered and pt agreeable to plan of care and goals.    Anticipated barriers to physical therapy: Severity of tear       GOALS: Short Term Goals:  6 weeks (met)  1.Report decreased shoulder pain < / =  2/10  to increase tolerance for exercise  2. Assess shoulder ROM at 2 weeks post op and establish goals   3. Increased strength by 1/3 MMT grade in gross shoulder girdle to increase tolerance for ADL and work activities.  4. Pt to tolerate HEP to improve ROM and independence with ADL's     Long Term Goals: 16 weeks (progressing, not met)  1.Report decreased shouler pain  < / " =  0 /10  to increase tolerance for ADLs  2.Increase AROM to equal the contralateral limb  3.Increase strength to >/= 4/5 in right shoulder to increase tolerance for ADL and work activities.  4. Pt goal: Resume exercise program  5. Pt will have improved gcode of CJ (20-40% limited) on FOTO shoulder in order to demonstrate true functional improvement.    Plan   Plan of care Certification: 8/31/2020 to 03/21/2020     Outpatient Physical Therapy 1 times weekly for 8 weeks to include the following interventions: manual therapy, therapeutic exercise, therapeutic activities, and neuromuscular re-education. Progress to 2 session a week following 8 week post op iraida.     Rishabh Carter, PT, DPT    Co-treated by Abhijeet Suarez, PT, DPT

## 2020-10-26 ENCOUNTER — CLINICAL SUPPORT (OUTPATIENT)
Dept: REHABILITATION | Facility: HOSPITAL | Age: 70
End: 2020-10-26
Payer: MEDICARE

## 2020-10-26 DIAGNOSIS — M25.511 ACUTE PAIN OF RIGHT SHOULDER: ICD-10-CM

## 2020-10-26 PROCEDURE — 97110 THERAPEUTIC EXERCISES: CPT

## 2020-10-27 NOTE — PROGRESS NOTES
Physical Therapy Daily Treatment Note     Name: Calderon Burt Jr.  Clinic Number: [id number]    Therapy Diagnosis:   Encounter Diagnosis   Name Primary?    Acute pain of right shoulder      Physician: Ben Escoto PA*    Visit Date: 10/26/2020  Medical Diagnosis from Referral:   Diagnosis   S46.011A (ICD-10-CM) - Traumatic tear of right rotator cuff, unspecified tear extent, initial encounter   M75.21 (ICD-10-CM) - Biceps tendinitis of right upper extremity   M19.011 (ICD-10-CM) - Arthritis of right acromioclavicular joint         OPERATION:   Right  1. Shoulder arthroscopic rotator cuff repair, 6 anchor (CPT 86342, complex modifier-22)  2. Shoulder arthroscopic biceps tenodesis (CPT 81013)  3. Shoulder arthroscopic extensive debridement (anterior, posterior glenohumeral joint, subacromial space) (CPT 65942)    4. Shoulder arthroscopic subacromial decompression (CPT 23139)    Evaluation Date: 8/31/2020  Authorization Period Expiration: 08/20/2021  Plan of Care Expiration: 03/20/2021  Visit # / Visits authorized: 8/ 20     Time In: 1115  Time Out: 1200  Total Billable Time: 45 minutes    Precautions: Standard and High blood pressure  -Physical Therapy: Follow the > 3 cm cuff repair rehab protocol. PROM can start in 2 weeks. AROM starts at 6-7 weeks. No cuff resistive activity x 12 weeks. No biceps resistive activity x 8 weeks.    Subjective     Pt reports: Minimal shoulder pain.    He was compliant with home exercise program.  Response to previous treatment: Decreased pain  Functional change: NA    Pain: 1/10  Location: right shoulder      Objective   DOS: 8/28/2020    Days post op: 8 weeks 3 days    Passive Range of Motion:   Shoulder Right Left   Flexion 120 -   Abduction 90 -   ER at 0 45 -   ER at 90 40 -   IR 30 -      Active Range of Motion:   Shoulder Right Left   Flexion 100 -   Abduction 80 -   ER at 0 40 -   ER at 90 NA -   IR ELIZABETH -        Calderon received therapeutic exercises to develop strength,  "endurance, ROM and flexibility for 45 minutes including:  -LLLD stretch in seated 5'  -Supine short to long lever flexion  -Sidelying ER AROM 3 x 15  -PROM within protocol limits for shoulder flexion, elbow flexion/ext  -Scapular retraction with manual cues x 30, 5" hold  -Table slides to 90 degrees x 40 flexion and ABD      Home Exercises Provided and Patient Education Provided     Education provided:   - Importance of post op precautions, continue AAROM for 1 more week   - Slowly discontinue sling at 8 weeks post op       Written Home Exercises Provided: yes.  Exercises were reviewed and Calderon was able to demonstrate them prior to the end of the session.  Calderon demonstrated good  understanding of the education provided.      See EMR under Patient Instructions for exercises provided 8/31/2020.    Assessment   Patient reached the 8 week post op iraida and was able to progress as per protocol. Able to progress with AROM. Slowly progress AROM and ROM as per protocol.    Pt prognosis is Fair.     Pt will continue to benefit from skilled outpatient physical therapy to address the deficits listed in the problem list box on initial evaluation, provide pt/family education and to maximize pt's level of independence in the home and community environment.     Pt's spiritual, cultural and educational needs considered and pt agreeable to plan of care and goals.    Anticipated barriers to physical therapy: Severity of tear       GOALS: Short Term Goals:  6 weeks (met)  1.Report decreased shoulder pain < / =  2/10  to increase tolerance for exercise  2. Assess shoulder ROM at 2 weeks post op and establish goals   3. Increased strength by 1/3 MMT grade in gross shoulder girdle to increase tolerance for ADL and work activities.  4. Pt to tolerate HEP to improve ROM and independence with ADL's     Long Term Goals: 16 weeks (progressing, not met)  1.Report decreased shouler pain  < / =  0 /10  to increase tolerance for " ADLs  2.Increase AROM to equal the contralateral limb  3.Increase strength to >/= 4/5 in right shoulder to increase tolerance for ADL and work activities.  4. Pt goal: Resume exercise program  5. Pt will have improved gcode of CJ (20-40% limited) on FOTO shoulder in order to demonstrate true functional improvement.    Plan   Plan of care Certification: 8/31/2020 to 03/21/2020     Outpatient Physical Therapy 1 times weekly for 8 weeks to include the following interventions: manual therapy, therapeutic exercise, therapeutic activities, and neuromuscular re-education. Progress to 2 session a week following 8 week post op iriada.     Abhijeet Suarez PT, DPT    Co-treated by Abhijeet Suarez PT, ANTOINETTET

## 2020-10-29 ENCOUNTER — CLINICAL SUPPORT (OUTPATIENT)
Dept: REHABILITATION | Facility: HOSPITAL | Age: 70
End: 2020-10-29
Payer: MEDICARE

## 2020-10-29 DIAGNOSIS — M25.511 ACUTE PAIN OF RIGHT SHOULDER: ICD-10-CM

## 2020-10-29 PROCEDURE — 97110 THERAPEUTIC EXERCISES: CPT

## 2020-10-29 PROCEDURE — 97140 MANUAL THERAPY 1/> REGIONS: CPT

## 2020-10-29 NOTE — PROGRESS NOTES
Physical Therapy Daily Treatment Note     Name: Calderon Burt Jr.  Clinic Number: [id number]    Therapy Diagnosis:   Encounter Diagnosis   Name Primary?    Acute pain of right shoulder      Physician: Ben Escoto PA*    Visit Date: 10/29/2020  Medical Diagnosis from Referral:   Diagnosis   S46.011A (ICD-10-CM) - Traumatic tear of right rotator cuff, unspecified tear extent, initial encounter   M75.21 (ICD-10-CM) - Biceps tendinitis of right upper extremity   M19.011 (ICD-10-CM) - Arthritis of right acromioclavicular joint         OPERATION:   Right  1. Shoulder arthroscopic rotator cuff repair, 6 anchor (CPT 43097, complex modifier-22)  2. Shoulder arthroscopic biceps tenodesis (CPT 33557)  3. Shoulder arthroscopic extensive debridement (anterior, posterior glenohumeral joint, subacromial space) (CPT 80779)    4. Shoulder arthroscopic subacromial decompression (CPT 11437)    Evaluation Date: 8/31/2020  Authorization Period Expiration: 08/20/2021  Plan of Care Expiration: 03/20/2021  Visit # / Visits authorized: 9/ 20     Time In: 1430  Time Out: 1520  Total Billable Time: 45 minutes    Precautions: Standard and High blood pressure  -Physical Therapy: Follow the > 3 cm cuff repair rehab protocol. PROM can start in 2 weeks. AROM starts at 6-7 weeks. No cuff resistive activity x 12 weeks. No biceps resistive activity x 8 weeks.    Subjective     Pt reports: Minimal shoulder pain.    He was compliant with home exercise program.  Response to previous treatment: Decreased pain  Functional change: NA    Pain: 1/10  Location: right shoulder      Objective   DOS: 8/28/2020    Days post op: 8 weeks 5 days    Passive Range of Motion:   Shoulder Right Left   Flexion 120 -   Abduction 90 -   ER at 0 55 -   ER at 90 55 -   IR 30 -      Active Range of Motion:   Shoulder Right Left   Flexion 100 -   Abduction 80 -   ER at 0 40 -   ER at 90 NA -   IR ELIZABETH -        Calderon received therapeutic exercises to develop strength,  "endurance, ROM and flexibility for 45 minutes including:  -LLLD stretch in seated 5'  -Supine short to long lever flexion  - Supine ER/IR AROM x 20  -Sidelying ER AROM 3 x 15  -PROM within protocol limits for shoulder flexion, elbow flexion/ext  -Scapular retraction with manual cues x 30, 5" hold  -Wall slide 2 x 10 ea      Home Exercises Provided and Patient Education Provided     Education provided:   - Importance of post op precautions, continue AAROM for 1 more week   - Slowly discontinue sling at 8 weeks post op       Written Home Exercises Provided: yes.  Exercises were reviewed and Calderon was able to demonstrate them prior to the end of the session.  Calderon demonstrated good  understanding of the education provided.      See EMR under Patient Instructions for exercises provided 8/31/2020.    Assessment   Continues to progress well with AAROM and AROM for rotation. Will continue to progress as per protocol allows.    Pt prognosis is Fair.     Pt will continue to benefit from skilled outpatient physical therapy to address the deficits listed in the problem list box on initial evaluation, provide pt/family education and to maximize pt's level of independence in the home and community environment.     Pt's spiritual, cultural and educational needs considered and pt agreeable to plan of care and goals.    Anticipated barriers to physical therapy: Severity of tear       GOALS: Short Term Goals:  6 weeks (met)  1.Report decreased shoulder pain < / =  2/10  to increase tolerance for exercise  2. Assess shoulder ROM at 2 weeks post op and establish goals   3. Increased strength by 1/3 MMT grade in gross shoulder girdle to increase tolerance for ADL and work activities.  4. Pt to tolerate HEP to improve ROM and independence with ADL's     Long Term Goals: 16 weeks (progressing, not met)  1.Report decreased shouler pain  < / =  0 /10  to increase tolerance for ADLs  2.Increase AROM to equal the contralateral " limb  3.Increase strength to >/= 4/5 in right shoulder to increase tolerance for ADL and work activities.  4. Pt goal: Resume exercise program  5. Pt will have improved gcode of CJ (20-40% limited) on FOTO shoulder in order to demonstrate true functional improvement.    Plan   Plan of care Certification: 8/31/2020 to 03/21/2020     Outpatient Physical Therapy 1 times weekly for 8 weeks to include the following interventions: manual therapy, therapeutic exercise, therapeutic activities, and neuromuscular re-education. Progress to 2 session a week following 8 week post op iraida.     Abhijeet Suarez, PT, DPT

## 2020-11-03 ENCOUNTER — CLINICAL SUPPORT (OUTPATIENT)
Dept: REHABILITATION | Facility: HOSPITAL | Age: 70
End: 2020-11-03
Payer: MEDICARE

## 2020-11-03 DIAGNOSIS — M25.511 ACUTE PAIN OF RIGHT SHOULDER: ICD-10-CM

## 2020-11-03 PROCEDURE — 97110 THERAPEUTIC EXERCISES: CPT

## 2020-11-03 NOTE — PROGRESS NOTES
Physical Therapy Daily Treatment Note     Name: Calderon Burt Jr.  Clinic Number: [id number]    Therapy Diagnosis:   No diagnosis found.  Physician: Ben Escoto PA*    Visit Date: 11/3/2020  Medical Diagnosis from Referral:   Diagnosis   S46.011A (ICD-10-CM) - Traumatic tear of right rotator cuff, unspecified tear extent, initial encounter   M75.21 (ICD-10-CM) - Biceps tendinitis of right upper extremity   M19.011 (ICD-10-CM) - Arthritis of right acromioclavicular joint         OPERATION:   Right  1. Shoulder arthroscopic rotator cuff repair, 6 anchor (CPT 40541, complex modifier-22)  2. Shoulder arthroscopic biceps tenodesis (CPT 43449)  3. Shoulder arthroscopic extensive debridement (anterior, posterior glenohumeral joint, subacromial space) (CPT 05348)    4. Shoulder arthroscopic subacromial decompression (CPT 95882)    Evaluation Date: 8/31/2020  Authorization Period Expiration: 08/20/2021  Plan of Care Expiration: 03/20/2021  Visit # / Visits authorized: 8/ 20     Time In: 1515  Time Out: 1600  Total Billable Time: 45 minutes    Precautions: Standard and High blood pressure  -Physical Therapy: Follow the > 3 cm cuff repair rehab protocol. PROM can start in 2 weeks. AROM starts at 6-7 weeks. No cuff resistive activity x 12 weeks. No biceps resistive activity x 8 weeks.    Subjective     Pt reports: Minimal shoulder pain.    He was compliant with home exercise program.  Response to previous treatment: Decreased pain  Functional change: NA    Pain: 1/10  Location: right shoulder      Objective   DOS: 8/28/2020    Days post op: 9 weeks 3 days    Passive Range of Motion:   Shoulder Right Left   Flexion 120 -   Abduction 90 -   ER at 0 55 -   ER at 90 55 -   IR 30 -      Active Range of Motion:   Shoulder Right Left   Flexion 100 -   Abduction 80 -   ER at 0 40 -   ER at 90 NA -   IR ELIZABETH -        Calderon received therapeutic exercises to develop strength, endurance, ROM and flexibility for 45 minutes  "including:  -LLLD stretch in seated 5'  -Supine AAROM flexion with 1# dowel 3x15  -Sidelying ER AROM 3 x 15 with 5s hold   -PROM within protocol limits for shoulder flexion, elbow flexion/ext  -Scapular retraction with manual cues x 30, 5" hold  -Wall slide 3 x 10 ea  -Hands behind head scap sets 20x5s hold  -AAROM ER stretch with dowel x20      Home Exercises Provided and Patient Education Provided     Education provided:   - Importance of post op precautions, continue AAROM for 1 more week   - Work on AAROM flexion at home and ER ROM to improve overhead mobility.      Written Home Exercises Provided: yes.  Exercises were reviewed and Calderon was able to demonstrate them prior to the end of the session.  Calderon demonstrated good  understanding of the education provided.      See EMR under Patient Instructions for exercises provided 8/31/2020.    Assessment   Calderon tolerated treatment well with focus on improving shoulder passive and AROM as well as periscapular strengthening. He is progressing well at 9 weeks post op, will continue to progress ROM and strength as allowed per protocol.     Pt prognosis is Fair.     Pt will continue to benefit from skilled outpatient physical therapy to address the deficits listed in the problem list box on initial evaluation, provide pt/family education and to maximize pt's level of independence in the home and community environment.     Pt's spiritual, cultural and educational needs considered and pt agreeable to plan of care and goals.    Anticipated barriers to physical therapy: Severity of tear       GOALS: Short Term Goals:  6 weeks (met)  1.Report decreased shoulder pain < / =  2/10  to increase tolerance for exercise  2. Assess shoulder ROM at 2 weeks post op and establish goals   3. Increased strength by 1/3 MMT grade in gross shoulder girdle to increase tolerance for ADL and work activities.  4. Pt to tolerate HEP to improve ROM and independence with ADL's     Long Term " Goals: 16 weeks (progressing, not met)  1.Report decreased shouler pain  < / =  0 /10  to increase tolerance for ADLs  2.Increase AROM to equal the contralateral limb  3.Increase strength to >/= 4/5 in right shoulder to increase tolerance for ADL and work activities.  4. Pt goal: Resume exercise program  5. Pt will have improved gcode of CJ (20-40% limited) on FOTO shoulder in order to demonstrate true functional improvement.    Plan   Plan of care Certification: 8/31/2020 to 03/21/2020     Outpatient Physical Therapy 1 times weekly for 8 weeks to include the following interventions: manual therapy, therapeutic exercise, therapeutic activities, and neuromuscular re-education. Progress to 2 session a week following 8 week post op iraida.     Rishabh Carter, PT, DPT

## 2020-11-05 ENCOUNTER — CLINICAL SUPPORT (OUTPATIENT)
Dept: REHABILITATION | Facility: HOSPITAL | Age: 70
End: 2020-11-05
Payer: MEDICARE

## 2020-11-05 DIAGNOSIS — M25.511 ACUTE PAIN OF RIGHT SHOULDER: ICD-10-CM

## 2020-11-05 PROCEDURE — 97110 THERAPEUTIC EXERCISES: CPT

## 2020-11-05 NOTE — PROGRESS NOTES
Physical Therapy Daily Treatment Note     Name: Calderon Burt Jr.  Clinic Number: [id number]    Therapy Diagnosis:   Encounter Diagnosis   Name Primary?    Acute pain of right shoulder      Physician: Ben Escoto PA*    Visit Date: 11/5/2020  Medical Diagnosis from Referral:   Diagnosis   S46.011A (ICD-10-CM) - Traumatic tear of right rotator cuff, unspecified tear extent, initial encounter   M75.21 (ICD-10-CM) - Biceps tendinitis of right upper extremity   M19.011 (ICD-10-CM) - Arthritis of right acromioclavicular joint         OPERATION:   Right  1. Shoulder arthroscopic rotator cuff repair, 6 anchor (CPT 89146, complex modifier-22)  2. Shoulder arthroscopic biceps tenodesis (CPT 47931)  3. Shoulder arthroscopic extensive debridement (anterior, posterior glenohumeral joint, subacromial space) (CPT 90978)    4. Shoulder arthroscopic subacromial decompression (CPT 87743)    Evaluation Date: 8/31/2020  Authorization Period Expiration: 08/20/2021  Plan of Care Expiration: 03/20/2021  Visit # / Visits authorized: 9/ 20     Time In: 1016  Time Out: 1101  Total Billable Time: 45 minutes    Precautions: Standard and High blood pressure  -Physical Therapy: Follow the > 3 cm cuff repair rehab protocol. PROM can start in 2 weeks. AROM starts at 6-7 weeks. No cuff resistive activity x 12 weeks. No biceps resistive activity x 8 weeks.    Subjective     Pt reports: doing well, some soreness in R shoulder from HEP.     He was compliant with home exercise program.  Response to previous treatment: None reported   Functional change: None reported    Pain: 1/10  Location: right shoulder      Objective   DOS: 8/28/2020    Days post op: 9 weeks 5 days      Calderon received therapeutic exercises to develop strength, endurance, ROM and flexibility for 45 minutes including:    -LLLD stretch in seated 5'   -PROM within protocol limits for shoulder flexion, elbow flexion/ext  -Supine AAROM flexion with 1# dowel 3x15  -AAROM ER  "stretch with dowel x20   -Sidelying ER AROM 3 x 15 with 5s hold   - Wall slide 3 x 10 ea  -Scapular retraction with manual cues x 30, 5" hold  - Prone scap sets with extension 3x10   - Prone row only even with body 3x10  - ABCs on wall below 90 degrees flexion x3 sets     NT:   -Level 1 Mid trap  20x5s hold        Home Exercises Provided and Patient Education Provided     Education provided:   - Importance of post op precautions, continue AAROM for 1 more week   - Work on AAROM flexion at home and ER ROM to improve overhead mobility.      Written Home Exercises Provided: yes.  Exercises were reviewed and Calderon was able to demonstrate them prior to the end of the session.  Calderon demonstrated good  understanding of the education provided.      See EMR under Patient Instructions for exercises provided 8/31/2020.    Assessment   Calderon tolerated treatment well with continued focus on improving shoulder passive and AROM as well as periscapular strengthening. He is progressing as expected with ROM and strength. Will continue progressing as able.     Pt prognosis is Fair.     Pt will continue to benefit from skilled outpatient physical therapy to address the deficits listed in the problem list box on initial evaluation, provide pt/family education and to maximize pt's level of independence in the home and community environment.     Pt's spiritual, cultural and educational needs considered and pt agreeable to plan of care and goals.    Anticipated barriers to physical therapy: Severity of tear       GOALS: Short Term Goals:  6 weeks (met)  1.Report decreased shoulder pain < / =  2/10  to increase tolerance for exercise  2. Assess shoulder ROM at 2 weeks post op and establish goals   3. Increased strength by 1/3 MMT grade in gross shoulder girdle to increase tolerance for ADL and work activities.  4. Pt to tolerate HEP to improve ROM and independence with ADL's     Long Term Goals: 16 weeks (progressing, not met)  1.Report " decreased shouler pain  < / =  0 /10  to increase tolerance for ADLs  2.Increase AROM to equal the contralateral limb  3.Increase strength to >/= 4/5 in right shoulder to increase tolerance for ADL and work activities.  4. Pt goal: Resume exercise program  5. Pt will have improved gcode of CJ (20-40% limited) on FOTO shoulder in order to demonstrate true functional improvement.    Plan   Plan of care Certification: 8/31/2020 to 03/21/2020     Outpatient Physical Therapy 1 times weekly for 8 weeks to include the following interventions: manual therapy, therapeutic exercise, therapeutic activities, and neuromuscular re-education. Progress to 2 session a week following 8 week post op iraida.     Rishabh Carter, PT, DPT

## 2020-11-09 ENCOUNTER — CLINICAL SUPPORT (OUTPATIENT)
Dept: REHABILITATION | Facility: HOSPITAL | Age: 70
End: 2020-11-09
Payer: MEDICARE

## 2020-11-09 DIAGNOSIS — M25.511 ACUTE PAIN OF RIGHT SHOULDER: ICD-10-CM

## 2020-11-09 PROCEDURE — 97112 NEUROMUSCULAR REEDUCATION: CPT

## 2020-11-09 PROCEDURE — 97110 THERAPEUTIC EXERCISES: CPT

## 2020-11-09 NOTE — PROGRESS NOTES
Physical Therapy Daily Treatment Note     Name: Calderon Burt Jr.  Clinic Number: [id number]    Therapy Diagnosis:   Encounter Diagnosis   Name Primary?    Acute pain of right shoulder      Physician: Ben Escoto PA*    Visit Date: 11/9/2020  Medical Diagnosis from Referral:   Diagnosis   S46.011A (ICD-10-CM) - Traumatic tear of right rotator cuff, unspecified tear extent, initial encounter   M75.21 (ICD-10-CM) - Biceps tendinitis of right upper extremity   M19.011 (ICD-10-CM) - Arthritis of right acromioclavicular joint         OPERATION:   Right  1. Shoulder arthroscopic rotator cuff repair, 6 anchor (CPT 68208, complex modifier-22)  2. Shoulder arthroscopic biceps tenodesis (CPT 66342)  3. Shoulder arthroscopic extensive debridement (anterior, posterior glenohumeral joint, subacromial space) (CPT 15872)    4. Shoulder arthroscopic subacromial decompression (CPT 02185)    Evaluation Date: 8/31/2020  Authorization Period Expiration: 08/20/2021  Plan of Care Expiration: 03/20/2021  Visit # / Visits authorized: 12/ 20     Time In: 0730  Time Out: 0815  Total Billable Time: 45 minutes    Precautions: Standard and High blood pressure  -Physical Therapy: Follow the > 3 cm cuff repair rehab protocol. PROM can start in 2 weeks. AROM starts at 6-7 weeks. No cuff resistive activity x 12 weeks. No biceps resistive activity x 8 weeks.    Subjective     Pt reports: Transitioning to sleeping on his bed instead of the couch which is a little challenging.     He was compliant with home exercise program.  Response to previous treatment: None reported   Functional change: None reported    Pain: 1/10  Location: right shoulder      Objective   DOS: 8/28/2020    Days post op: 10 weeks 2 days      Calderon received therapeutic exercises to develop strength, endurance, ROM and flexibility for 30 minutes including:    -LLLD stretch in seated 5'   -PROM within protocol limits for shoulder flexion, elbow flexion/ext  -Supine AAROM  flexion with 1# dowel 3x15  -AAROM ER stretch with dowel x20   -Sidelying ER AROM 4 x 8 with 5s hold, 1:00 rest in between sets  - Wall slide 3 x 10 ea        Calderon received neuromuscular re-education to develop postural control, movement patterns, and muscle sequencing for 15 minutes including:  Supine AROM ER/IR with cues for decreased anterior shear, 1 lb for 3'  Supine short to long lever flexion with gravity assist 4 x 10      Home Exercises Provided and Patient Education Provided     Education provided:   - Importance of post op precautions, continue AAROM for 1 more week   - Work on AAROM flexion at home and ER ROM to improve overhead mobility.      Written Home Exercises Provided: yes.  Exercises were reviewed and Calderon was able to demonstrate them prior to the end of the session.  Calderon demonstrated good  understanding of the education provided.      See EMR under Patient Instructions for exercises provided 8/31/2020.    Assessment   Progressing well. Focus on full ROM and AROM. Progress to resistive RTC in 2 weeks.    Pt prognosis is Fair.     Pt will continue to benefit from skilled outpatient physical therapy to address the deficits listed in the problem list box on initial evaluation, provide pt/family education and to maximize pt's level of independence in the home and community environment.     Pt's spiritual, cultural and educational needs considered and pt agreeable to plan of care and goals.    Anticipated barriers to physical therapy: Severity of tear       GOALS: Short Term Goals:  6 weeks (met)  1.Report decreased shoulder pain < / =  2/10  to increase tolerance for exercise  2. Assess shoulder ROM at 2 weeks post op and establish goals   3. Increased strength by 1/3 MMT grade in gross shoulder girdle to increase tolerance for ADL and work activities.  4. Pt to tolerate HEP to improve ROM and independence with ADL's     Long Term Goals: 16 weeks (progressing, not met)  1.Report decreased  shouler pain  < / =  0 /10  to increase tolerance for ADLs  2.Increase AROM to equal the contralateral limb  3.Increase strength to >/= 4/5 in right shoulder to increase tolerance for ADL and work activities.  4. Pt goal: Resume exercise program  5. Pt will have improved gcode of CJ (20-40% limited) on FOTO shoulder in order to demonstrate true functional improvement.    Plan   Plan of care Certification: 8/31/2020 to 03/21/2020     Outpatient Physical Therapy 1 times weekly for 8 weeks to include the following interventions: manual therapy, therapeutic exercise, therapeutic activities, and neuromuscular re-education. Progress to 2 session a week following 8 week post op iraida.     Abhijeet Suarez, PT, DPT

## 2020-11-13 ENCOUNTER — CLINICAL SUPPORT (OUTPATIENT)
Dept: REHABILITATION | Facility: HOSPITAL | Age: 70
End: 2020-11-13
Payer: MEDICARE

## 2020-11-13 DIAGNOSIS — M25.511 ACUTE PAIN OF RIGHT SHOULDER: ICD-10-CM

## 2020-11-13 PROCEDURE — 97110 THERAPEUTIC EXERCISES: CPT

## 2020-11-13 PROCEDURE — 97112 NEUROMUSCULAR REEDUCATION: CPT

## 2020-11-13 NOTE — PROGRESS NOTES
Physical Therapy Daily Treatment Note     Name: Calderon Burt Jr.  Clinic Number: [id number]    Therapy Diagnosis:   Encounter Diagnosis   Name Primary?    Acute pain of right shoulder      Physician: Ben Escoto PA*    Visit Date: 11/13/2020  Medical Diagnosis from Referral:   Diagnosis   S46.011A (ICD-10-CM) - Traumatic tear of right rotator cuff, unspecified tear extent, initial encounter   M75.21 (ICD-10-CM) - Biceps tendinitis of right upper extremity   M19.011 (ICD-10-CM) - Arthritis of right acromioclavicular joint         OPERATION:   Right  1. Shoulder arthroscopic rotator cuff repair, 6 anchor (CPT 53094, complex modifier-22)  2. Shoulder arthroscopic biceps tenodesis (CPT 40383)  3. Shoulder arthroscopic extensive debridement (anterior, posterior glenohumeral joint, subacromial space) (CPT 67950)    4. Shoulder arthroscopic subacromial decompression (CPT 70860)    Evaluation Date: 8/31/2020  Authorization Period Expiration: 08/20/2021  Plan of Care Expiration: 03/20/2021  Visit # / Visits authorized: 13/ 20     Time In: 1112  Time Out: 1200  Total Billable Time: 45 minutes    Precautions: Standard and High blood pressure  -Physical Therapy: Follow the > 3 cm cuff repair rehab protocol. PROM can start in 2 weeks. AROM starts at 6-7 weeks. No cuff resistive activity x 12 weeks. No biceps resistive activity x 8 weeks.    Subjective     Pt reports: No shoulder pain. Continues to progress with his HEP.    He was compliant with home exercise program.  Response to previous treatment: None reported   Functional change: None reported    Pain: 1/10  Location: right shoulder      Objective   DOS: 8/28/2020    Days post op: 10 weeks 6 days    Calderon received therapeutic exercises to develop strength, endurance, ROM and flexibility for 30 minutes including:    -PROM within protocol limits for shoulder flexion, elbow flexion/ext  -Supine AAROM flexion with 1# dowel 3x15  -AAROM ER stretch with dowel x20    -Sidelying ER AROM 4 x 8 with 5s hold, 1:00 rest in between sets  - Wall slide 3 x 10 ea    Calderon received neuromuscular re-education to develop postural control, movement patterns, and muscle sequencing for 15 minutes including:  Supine AROM ER/IR with cues for decreased anterior shear, 1 lb for 3'  Supine short to long lever flexion with gravity assist 4 x 10  Sidelying shoulder flexion with manual assist x 20      Home Exercises Provided and Patient Education Provided     Education provided:   - Importance of post op precautions, continue AAROM for 1 more week   - Work on AAROM flexion at home and ER ROM to improve overhead mobility.      Written Home Exercises Provided: yes.  Exercises were reviewed and Calderon was able to demonstrate them prior to the end of the session.  Calderon demonstrated good  understanding of the education provided.      See EMR under Patient Instructions for exercises provided 8/31/2020.    Assessment   ROM continues to improve. Focusing on gradually improving AROM in a gravity eliminated position. Posterior scapulohumeral muscle length is short. Continues to require manual cuing for NMR.     Pt prognosis is Fair.     Pt will continue to benefit from skilled outpatient physical therapy to address the deficits listed in the problem list box on initial evaluation, provide pt/family education and to maximize pt's level of independence in the home and community environment.     Pt's spiritual, cultural and educational needs considered and pt agreeable to plan of care and goals.    Anticipated barriers to physical therapy: Severity of tear       GOALS: Short Term Goals:  6 weeks (met)  1.Report decreased shoulder pain < / =  2/10  to increase tolerance for exercise  2. Assess shoulder ROM at 2 weeks post op and establish goals   3. Increased strength by 1/3 MMT grade in gross shoulder girdle to increase tolerance for ADL and work activities.  4. Pt to tolerate HEP to improve ROM and  independence with ADL's     Long Term Goals: 16 weeks (progressing, not met)  1.Report decreased shouler pain  < / =  0 /10  to increase tolerance for ADLs  2.Increase AROM to equal the contralateral limb  3.Increase strength to >/= 4/5 in right shoulder to increase tolerance for ADL and work activities.  4. Pt goal: Resume exercise program  5. Pt will have improved gcode of CJ (20-40% limited) on FOTO shoulder in order to demonstrate true functional improvement.    Plan   Plan of care Certification: 8/31/2020 to 03/21/2020     Outpatient Physical Therapy 1 times weekly for 8 weeks to include the following interventions: manual therapy, therapeutic exercise, therapeutic activities, and neuromuscular re-education. Progress to 2 session a week following 8 week post op iraida.     Abhijeet Suarez, PT, DPT

## 2020-11-16 ENCOUNTER — CLINICAL SUPPORT (OUTPATIENT)
Dept: REHABILITATION | Facility: HOSPITAL | Age: 70
End: 2020-11-16
Payer: MEDICARE

## 2020-11-16 DIAGNOSIS — M25.511 ACUTE PAIN OF RIGHT SHOULDER: ICD-10-CM

## 2020-11-16 PROCEDURE — 97110 THERAPEUTIC EXERCISES: CPT

## 2020-11-16 PROCEDURE — 97112 NEUROMUSCULAR REEDUCATION: CPT

## 2020-11-16 NOTE — PROGRESS NOTES
Physical Therapy Daily Treatment Note     Name: Calderon Burt Jr.  Clinic Number: [id number]    Therapy Diagnosis:   Encounter Diagnosis   Name Primary?    Acute pain of right shoulder      Physician: Ben Escoto PA*    Visit Date: 11/16/2020  Medical Diagnosis from Referral:   Diagnosis   S46.011A (ICD-10-CM) - Traumatic tear of right rotator cuff, unspecified tear extent, initial encounter   M75.21 (ICD-10-CM) - Biceps tendinitis of right upper extremity   M19.011 (ICD-10-CM) - Arthritis of right acromioclavicular joint         OPERATION:   Right  1. Shoulder arthroscopic rotator cuff repair, 6 anchor (CPT 60580, complex modifier-22)  2. Shoulder arthroscopic biceps tenodesis (CPT 62073)  3. Shoulder arthroscopic extensive debridement (anterior, posterior glenohumeral joint, subacromial space) (CPT 35568)    4. Shoulder arthroscopic subacromial decompression (CPT 76914)    Evaluation Date: 8/31/2020  Authorization Period Expiration: 08/20/2021  Plan of Care Expiration: 03/20/2021  Visit # / Visits authorized: 14/ 20     Time In: 0730  Time Out: 0830  Total Billable Time: 48 minutes    Precautions: Standard and High blood pressure  -Physical Therapy: Follow the > 3 cm cuff repair rehab protocol. PROM can start in 2 weeks. AROM starts at 6-7 weeks. No cuff resistive activity x 12 weeks. No biceps resistive activity x 8 weeks.    Subjective     Pt reports: Mild pain this morning but is progressing well.     He was compliant with home exercise program.  Response to previous treatment: None reported   Functional change: None reported    Pain: 1/10  Location: right shoulder      Objective   DOS: 8/28/2020  Days post op: 11 weeks 2 days      Passive Range of Motion:   Shoulder Right Left   Flexion 145 170   Abduction 110 170   ER at 0 45 60   ER at 90 60 90   IR 25 50        Calderon received therapeutic exercises to develop strength, endurance, ROM and flexibility for 30 minutes including:  -UBE 8 minutes for  shoulder strength and endurance  -PROM within protocol limits for shoulder flexion, elbow flexion/ext  -Supine AAROM flexion with 1# dowel 3x15  -AAROM ER supine AAROM #1 with dowel x30   -Sidelying ER AROM 4 x 8 with 5s hold, 1:00 rest in between sets  - Wall slide 3 x 10 ea  Supine AROM ER/IR with cues for decreased anterior shear, 1 lb for 3'    Calderon received neuromuscular re-education to develop postural control, movement patterns, and muscle sequencing for 20 minutes including:  Sidelying shoulder flexion with manual assist x 20  Quadruped serratus press x 15  Quadruped hand heel rocks for SA activation 3 x 10  Prone T lvl 1 3 x 10 with focus on middle trapezius activation  Rhymic stabilization in supine and quadruped      Home Exercises Provided and Patient Education Provided     Education provided:   - Importance of post op precautions, continue AAROM for 1 more week   - Work on AAROM flexion at home and ER ROM to improve overhead mobility.      Written Home Exercises Provided: yes.  Exercises were reviewed and Calderon was able to demonstrate them prior to the end of the session.  Calderon demonstrated good  understanding of the education provided.      See EMR under Patient Instructions for exercises provided 8/31/2020.    Assessment   Continues slow steady progression. PROM and AAROM continue to progress. Trapezius activation is challenging and requires cuing to avoid deltoid activation. Progression to closed chain is encouraging. Continue to progress full flexion and ER ROM as able     Pt prognosis is Fair.     Pt will continue to benefit from skilled outpatient physical therapy to address the deficits listed in the problem list box on initial evaluation, provide pt/family education and to maximize pt's level of independence in the home and community environment.     Pt's spiritual, cultural and educational needs considered and pt agreeable to plan of care and goals.    Anticipated barriers to physical  therapy: Severity of tear       GOALS: Short Term Goals:  6 weeks (met)  1.Report decreased shoulder pain < / =  2/10  to increase tolerance for exercise  2. Assess shoulder ROM at 2 weeks post op and establish goals   3. Increased strength by 1/3 MMT grade in gross shoulder girdle to increase tolerance for ADL and work activities.  4. Pt to tolerate HEP to improve ROM and independence with ADL's     Long Term Goals: 16 weeks (progressing, not met)  1.Report decreased shouler pain  < / =  0 /10  to increase tolerance for ADLs  2.Increase AROM to equal the contralateral limb  3.Increase strength to >/= 4/5 in right shoulder to increase tolerance for ADL and work activities.  4. Pt goal: Resume exercise program  5. Pt will have improved gcode of CJ (20-40% limited) on FOTO shoulder in order to demonstrate true functional improvement.    Plan   Plan of care Certification: 8/31/2020 to 03/21/2020     Outpatient Physical Therapy 1 times weekly for 8 weeks to include the following interventions: manual therapy, therapeutic exercise, therapeutic activities, and neuromuscular re-education. Progress to 2 session a week following 8 week post op iraida.     Abhijeet Suarez, PT, DPT

## 2020-11-16 NOTE — PROGRESS NOTES
CC: Right shoulder F/U    DATE OF PROCEDURE: 8/28/2020   OPERATION:   Right  1. Shoulder arthroscopic rotator cuff repair, 6 anchor (CPT 62759, complex modifier-22)  2. Shoulder arthroscopic biceps tenodesis (CPT 33496)  3. Shoulder arthroscopic extensive debridement (anterior, posterior glenohumeral joint, subacromial space) (CPT 87083)    4. Shoulder arthroscopic subacromial decompression (CPT 19221)        Calderon Burt Jr. reports to be doing well just over 12 wk s/p the above mentioned procedure. Going to PT 2xWeek at the [place] location working with Abhijeet. Has been getting to near symmetric rom. No pain issues and denies of any paresthesias in his hand.  Overall, he states that he feels good.     Pain Score: 0-No pain    PAST MEDICAL HISTORY:   Past Medical History:   Diagnosis Date    Cataract     Diabetes mellitus     Glaucoma     High cholesterol     Hypertension     Tendonitis        PAST SURGICAL HISTORY:  Past Surgical History:   Procedure Laterality Date    ARTHROSCOPIC REPAIR OF ROTATOR CUFF OF SHOULDER Right 8/28/2020    Procedure: REPAIR, ROTATOR CUFF, ARTHROSCOPIC;  Surgeon: CHENCHO Reyes MD;  Location: [facility] OR;  Service: Orthopedics;  Laterality: Right;  regional with catheter-interscalene  pericapsular injection: 30cc clonidine/epi/ketorolac/ropivacaine injection    CARPAL TUNNEL RELEASE      right/left    CATARACT EXTRACTION, BILATERAL      FIXATION OF TENDON Right 8/28/2020    Procedure: FIXATION, TENDON-BICEPS TENODESIS;  Surgeon: CHENCHO Reyes MD;  Location: [facility] OR;  Service: Orthopedics;  Laterality: Right;  FIXATION, TENDON-BICEPS TENODESIS    HEMORRHOID SURGERY      SHOULDER OPEN ROTATOR CUFF REPAIR      left    WRIST SURGERY      right/left       FAMILY HISTORY:  Family History   Problem Relation Age of Onset    Diabetes Mother     Alcohol abuse Mother     Hypertension Mother     Stroke Father     Alcohol abuse Father     Hypertension Father     Diabetes  Sister     Hypertension Sister     Diabetes Brother     Hypertension Brother     Diabetes Daughter     Diabetes Son     Vision loss Son     Stroke Son        MEDICATIONS:    Current Outpatient Medications:     benazepril (LOTENSIN) 20 MG tablet, Take 20 mg by mouth once daily., Disp: , Rfl:     bimatoprost (LUMIGAN) 0.01 % Drop, 1 drop every evening., Disp: , Rfl:     brimonidine-timolol (COMBIGAN) 0.2-0.5 % Drop, Place 1 drop into both eyes., Disp: , Rfl:     metFORMIN (GLUMETZA) 1000 MG (MOD) 24 hr tablet, Take 1,000 mg by mouth 2 (two) times daily with meals., Disp: , Rfl:     naproxen (NAPROSYN) 500 MG tablet, Take 1 tablet (500 mg total) by mouth 2 (two) times daily., Disp: 30 tablet, Rfl: 2    naproxen (NAPROSYN) 500 MG tablet, Take 1 tablet (500 mg total) by mouth 2 (two) times daily., Disp: 30 tablet, Rfl: 1    psyllium (METAMUCIL) powder, Take 1 packet by mouth once daily. Pt takes about twice a week, Disp: , Rfl:     tamsulosin (FLOMAX) 0.4 mg Cap, Take 0.4 mg by mouth once daily., Disp: , Rfl:     temazepam (RESTORIL) 7.5 MG Cap, Take 15 mg by mouth nightly as needed., Disp: , Rfl:     aspirin (ECOTRIN) 81 MG EC tablet, Take 1 tablet (81 mg total) by mouth 2 (two) times daily. for 14 days, Disp: 28 tablet, Rfl: 0    atorvastatin (LIPITOR) 40 MG tablet, Take 1 tablet (40 mg total) by mouth once daily., Disp: 90 tablet, Rfl: 0  No current facility-administered medications for this visit.     Facility-Administered Medications Ordered in Other Visits:     fentaNYL injection 25 mcg, 25 mcg, Intravenous, Q5 Min PRN, Marissa Luciano MD, 50 mcg at 08/28/20 0850    midazolam (VERSED) 1 mg/mL injection 0.5 mg, 0.5 mg, Intravenous, PRN, Marissa Luciano MD, 1 mg at 08/28/20 0850    ropivacaine 0.2% ON-Q C-BLOC 400 ML (SELECT A FLOW), , Perineural, Continuous, Marissa Luciano MD, Last Rate: 6 mL/hr at 08/28/20 5008    ALLERGIES:  Review of patient's allergies indicates:  No Known Allergies      REVIEW OF SYSTEMS:  Constitution: Negative. Negative for chills, fever and night sweats.    Hematologic/Lymphatic: Negative for bleeding problem. Does not bruise/bleed easily.   Skin: Negative for dry skin, itching and rash.   Musculoskeletal: Negative for falls. Negative for right shoulder pain and positive for muscle weakness.     All other review of symptoms were reviewed and found to be noncontributory.     PHYSICAL EXAMINATION:  Vitals:  BP (!) 151/93   Pulse 87   Ht 6' (1.829 m)   Wt 86.6 kg (191 lb)   BMI 25.90 kg/m²    General: Well-developed well-nourished 70 y.o. malein no acute distress   Cardiovascular: Regular rhythm by palpation of distal pulse, normal color and temperature, no concerning varicosities on symptomatic side   Lungs: No labored breathing or wheezing appreciated   Neuro: Alert and oriented ×3   Psychiatric: well oriented to person, place and time, demonstrates normal mood and affect   Skin: No rashes, lesions or ulcers, normal temperature, turgor, and texture on uninvolved extremity    Ortho/SPM Exam  Exam of the right shoulder demonstrates well-healed incisions.  The biceps is well tensioned.  Active forward elevation to 125°, external rotation with arm at side to 40°. Passive to 160. Symmetric to contralateral side.  IR R. To L4.  Left to T12.  No pain with resisted Supra / Infra testing. Good strength.     IMAGING:  No new imaging.    ASSESSMENT:      ICD-10-CM ICD-9-CM   1. Status post right rotator cuff repair  Z98.890 V45.89       PLAN:     The patient is making appropriate progress.  Continue physical therapy with focus on regaining active overhead range of motion and maximizing cuff strength.  -RTC in 8 wks   -Continue with therapy.   -All questions answered    Procedures

## 2020-11-18 ENCOUNTER — CLINICAL SUPPORT (OUTPATIENT)
Dept: REHABILITATION | Facility: HOSPITAL | Age: 70
End: 2020-11-18
Payer: MEDICARE

## 2020-11-18 DIAGNOSIS — M25.511 ACUTE PAIN OF RIGHT SHOULDER: ICD-10-CM

## 2020-11-18 PROCEDURE — 97140 MANUAL THERAPY 1/> REGIONS: CPT

## 2020-11-18 PROCEDURE — 97110 THERAPEUTIC EXERCISES: CPT

## 2020-11-18 NOTE — PROGRESS NOTES
Physical Therapy Daily Treatment Note     Name: Calderon Burt Jr.  Clinic Number: [id number]    Therapy Diagnosis:   Encounter Diagnosis   Name Primary?    Acute pain of right shoulder      Physician: Ben Escoto PA*    Visit Date: 11/18/2020  Medical Diagnosis from Referral:   Diagnosis   S46.011A (ICD-10-CM) - Traumatic tear of right rotator cuff, unspecified tear extent, initial encounter   M75.21 (ICD-10-CM) - Biceps tendinitis of right upper extremity   M19.011 (ICD-10-CM) - Arthritis of right acromioclavicular joint         OPERATION:   Right  1. Shoulder arthroscopic rotator cuff repair, 6 anchor (CPT 49981, complex modifier-22)  2. Shoulder arthroscopic biceps tenodesis (CPT 80391)  3. Shoulder arthroscopic extensive debridement (anterior, posterior glenohumeral joint, subacromial space) (CPT 46918)    4. Shoulder arthroscopic subacromial decompression (CPT 96254)    Evaluation Date: 8/31/2020  Authorization Period Expiration: 08/20/2021  Plan of Care Expiration: 03/20/2021  Visit # / Visits authorized: 15/ 20     Time In: 0840  Time Out: 0930  Total Billable Time: 48 minutes    Precautions: Standard and High blood pressure  -Physical Therapy: Follow the > 3 cm cuff repair rehab protocol. PROM can start in 2 weeks. AROM starts at 6-7 weeks. No cuff resistive activity x 12 weeks. No biceps resistive activity x 8 weeks.    Subjective     Pt reports: Felt great after the previous session but has some soreness following exercises at home.     He was compliant with home exercise program.  Response to previous treatment: None reported   Functional change: None reported    Pain: 1/10  Location: right shoulder      Objective   DOS: 8/28/2020  Days post op: 11 weeks 2 days      Passive Range of Motion:   Shoulder Right Left   Flexion 145 170   Abduction 110 170   ER at 0 50 60   ER at 90 70 90   IR 25 50        Calderon received therapeutic exercises to develop strength, endurance, ROM and flexibility for 30  minutes including:  -UBE 8 minutes for shoulder strength and endurance  -PROM within protocol limits for shoulder flexion, elbow flexion/ext  -Supine AAROM flexion with 1# dowel 3x15  -AAROM ER supine AAROM #1 with dowel x30   - Wall slide 3 x 10 ea with YTB  LLLD stretch shoulder abduction 5 lbs  Supine AROM ER/IR with cues for decreased anterior shear, 1 lb for 3'    Calderon received neuromuscular re-education to develop postural control, movement patterns, and muscle sequencing for 20 minutes including:  Sidelying shoulder flexion with manual assist x 20  Quadruped serratus press x 15  Quadruped hand heel rocks for SA activation 3 x 10  Prone T lvl 1 3 x 10 with focus on middle trapezius activation  Rhymic stabilization in supine and quadruped      Home Exercises Provided and Patient Education Provided     Education provided:   - Importance of post op precautions, continue AAROM for 1 more week   - Work on AAROM flexion at home and ER ROM to improve overhead mobility.      Written Home Exercises Provided: yes.  Exercises were reviewed and Calderon was able to demonstrate them prior to the end of the session.  Calderon demonstrated good  understanding of the education provided.      See EMR under Patient Instructions for exercises provided 8/31/2020.    Assessment   Continues slow steady progression. PROM and AAROM continue to progress. Continue to progress full flexion and ER ROM as able     Pt prognosis is Fair.     Pt will continue to benefit from skilled outpatient physical therapy to address the deficits listed in the problem list box on initial evaluation, provide pt/family education and to maximize pt's level of independence in the home and community environment.     Pt's spiritual, cultural and educational needs considered and pt agreeable to plan of care and goals.    Anticipated barriers to physical therapy: Severity of tear       GOALS: Short Term Goals:  6 weeks (met)  1.Report decreased shoulder pain < / =   2/10  to increase tolerance for exercise  2. Assess shoulder ROM at 2 weeks post op and establish goals   3. Increased strength by 1/3 MMT grade in gross shoulder girdle to increase tolerance for ADL and work activities.  4. Pt to tolerate HEP to improve ROM and independence with ADL's     Long Term Goals: 16 weeks (progressing, not met)  1.Report decreased shouler pain  < / =  0 /10  to increase tolerance for ADLs  2.Increase AROM to equal the contralateral limb  3.Increase strength to >/= 4/5 in right shoulder to increase tolerance for ADL and work activities.  4. Pt goal: Resume exercise program  5. Pt will have improved gcode of CJ (20-40% limited) on FOTO shoulder in order to demonstrate true functional improvement.    Plan   Plan of care Certification: 8/31/2020 to 03/21/2020     Outpatient Physical Therapy 1 times weekly for 8 weeks to include the following interventions: manual therapy, therapeutic exercise, therapeutic activities, and neuromuscular re-education. Progress to 2 session a week following 8 week post op iraida.     Abhijeet Suarez, PT, DPT

## 2020-11-24 ENCOUNTER — OFFICE VISIT (OUTPATIENT)
Dept: SPORTS MEDICINE | Facility: CLINIC | Age: 70
End: 2020-11-24
Payer: MEDICARE

## 2020-11-24 VITALS
SYSTOLIC BLOOD PRESSURE: 151 MMHG | WEIGHT: 191 LBS | BODY MASS INDEX: 25.87 KG/M2 | DIASTOLIC BLOOD PRESSURE: 93 MMHG | HEIGHT: 72 IN | HEART RATE: 87 BPM

## 2020-11-24 DIAGNOSIS — Z98.890 STATUS POST RIGHT ROTATOR CUFF REPAIR: Primary | ICD-10-CM

## 2020-11-24 PROCEDURE — 99999 PR PBB SHADOW E&M-EST. PATIENT-LVL III: ICD-10-PCS | Mod: PBBFAC,,, | Performed by: ORTHOPAEDIC SURGERY

## 2020-11-24 PROCEDURE — 99024 POSTOP FOLLOW-UP VISIT: CPT | Mod: POP,,, | Performed by: ORTHOPAEDIC SURGERY

## 2020-11-24 PROCEDURE — 99024 PR POST-OP FOLLOW-UP VISIT: ICD-10-PCS | Mod: POP,,, | Performed by: ORTHOPAEDIC SURGERY

## 2020-11-24 PROCEDURE — 99213 OFFICE O/P EST LOW 20 MIN: CPT | Mod: PBBFAC | Performed by: ORTHOPAEDIC SURGERY

## 2020-11-24 PROCEDURE — 99999 PR PBB SHADOW E&M-EST. PATIENT-LVL III: CPT | Mod: PBBFAC,,, | Performed by: ORTHOPAEDIC SURGERY

## 2020-11-30 ENCOUNTER — CLINICAL SUPPORT (OUTPATIENT)
Dept: REHABILITATION | Facility: HOSPITAL | Age: 70
End: 2020-11-30
Payer: MEDICARE

## 2020-11-30 DIAGNOSIS — M25.511 ACUTE PAIN OF RIGHT SHOULDER: ICD-10-CM

## 2020-11-30 PROCEDURE — 97112 NEUROMUSCULAR REEDUCATION: CPT

## 2020-11-30 PROCEDURE — 97110 THERAPEUTIC EXERCISES: CPT

## 2020-11-30 NOTE — PROGRESS NOTES
Physical Therapy Daily Treatment Note     Name: Calderon Burt Jr.  Clinic Number: [id number]    Therapy Diagnosis:   Encounter Diagnosis   Name Primary?    Acute pain of right shoulder      Physician: Ben Escoto PA*    Visit Date: 11/30/2020  Medical Diagnosis from Referral:   Diagnosis   S46.011A (ICD-10-CM) - Traumatic tear of right rotator cuff, unspecified tear extent, initial encounter   M75.21 (ICD-10-CM) - Biceps tendinitis of right upper extremity   M19.011 (ICD-10-CM) - Arthritis of right acromioclavicular joint         OPERATION:   Right  1. Shoulder arthroscopic rotator cuff repair, 6 anchor (CPT 62736, complex modifier-22)  2. Shoulder arthroscopic biceps tenodesis (CPT 29007)  3. Shoulder arthroscopic extensive debridement (anterior, posterior glenohumeral joint, subacromial space) (CPT 07065)    4. Shoulder arthroscopic subacromial decompression (CPT 45922)    Evaluation Date: 8/31/2020  Authorization Period Expiration: 08/20/2021  Plan of Care Expiration: 03/20/2021  Visit # / Visits authorized: 16/ 20     Time In: 0730  Time Out: 0827  Total Billable Time: 55 minutes    Precautions: Standard and High blood pressure  -Physical Therapy: Follow the > 3 cm cuff repair rehab protocol. PROM can start in 2 weeks. AROM starts at 6-7 weeks. No cuff resistive activity x 12 weeks. No biceps resistive activity x 8 weeks.    Subjective     Pt reports: Had increased pain over the weekend because he road his motorcycle and did other strenuous activities. Pain reduced with 2 tylenol and was gone the next day  He was compliant with home exercise program.  Response to previous treatment: None reported   Functional change: None reported    Pain: 1/10  Location: right shoulder      Objective   DOS: 8/28/2020    Passive Range of Motion:   Shoulder Right Left   Flexion 145 170   Abduction 110 170   ER at 0 50 60   ER at 90 70 90   IR 25 50        Calderon received therapeutic exercises to develop strength,  endurance, ROM and flexibility for 30 minutes including:  -UBE 8 minutes for shoulder strength and endurance  -PROM within protocol limits for shoulder flexion, elbow flexion/ext  -Supine AAROM flexion with 1# dowel 3x15  - Wall slide 3 x 10 ea with RTB  Supine AROM ER/IR with cues for decreased anterior shear, 2 lb for 3'  ER and IR walkouts at 0 degrees abduction 3 x 10 ea    Calderon received neuromuscular re-education to develop postural control, movement patterns, and muscle sequencing for 25 minutes including:  Sidelying shoulder flexion with manual assist x 20  Quadruped serratus press x 15  Quadruped hand heel rocks for SA activation 3 x 10  Prone T lvl 2 3 x 10 with focus on middle trapezius activation  Rhymic stabilization in supine and quadruped  Seated ER at 90 with manual inferior mobilization 3 x 15  Wall alphabets with 2 lb ball, 4x      Home Exercises Provided and Patient Education Provided     Education provided:   - Importance of post op precautions, continue AAROM for 1 more week   - Work on AAROM flexion at home and ER ROM to improve overhead mobility.      Written Home Exercises Provided: yes.  Exercises were reviewed and Calderon was able to demonstrate them prior to the end of the session.  Calderon demonstrated good  understanding of the education provided.      See EMR under Patient Instructions for exercises provided 11/30/2020.    Assessment   Progressing into RTC and scapular strengthening overhead. Progressing well. Some ROM limitations persist. Continuing with progression of both ROM and strength. Updated HEP to progress RTC and periscapular strengthening.     Pt prognosis is Fair.     Pt will continue to benefit from skilled outpatient physical therapy to address the deficits listed in the problem list box on initial evaluation, provide pt/family education and to maximize pt's level of independence in the home and community environment.     Pt's spiritual, cultural and educational needs  considered and pt agreeable to plan of care and goals.    Anticipated barriers to physical therapy: Severity of tear       GOALS: Short Term Goals:  6 weeks (met)  1.Report decreased shoulder pain < / =  2/10  to increase tolerance for exercise  2. Assess shoulder ROM at 2 weeks post op and establish goals   3. Increased strength by 1/3 MMT grade in gross shoulder girdle to increase tolerance for ADL and work activities.  4. Pt to tolerate HEP to improve ROM and independence with ADL's     Long Term Goals: 16 weeks (progressing, not met)  1.Report decreased shouler pain  < / =  0 /10  to increase tolerance for ADLs  2.Increase AROM to equal the contralateral limb  3.Increase strength to >/= 4/5 in right shoulder to increase tolerance for ADL and work activities.  4. Pt goal: Resume exercise program  5. Pt will have improved gcode of CJ (20-40% limited) on FOTO shoulder in order to demonstrate true functional improvement.    Plan   Plan of care Certification: 8/31/2020 to 03/21/2020     Outpatient Physical Therapy 1 times weekly for 8 weeks to include the following interventions: manual therapy, therapeutic exercise, therapeutic activities, and neuromuscular re-education. Progress to 2 session a week following 8 week post op iraida.     Abhijeet Suarez, PT, DPT

## 2020-12-02 ENCOUNTER — CLINICAL SUPPORT (OUTPATIENT)
Dept: REHABILITATION | Facility: HOSPITAL | Age: 70
End: 2020-12-02
Payer: MEDICARE

## 2020-12-02 DIAGNOSIS — M25.511 ACUTE PAIN OF RIGHT SHOULDER: ICD-10-CM

## 2020-12-02 PROCEDURE — 97110 THERAPEUTIC EXERCISES: CPT

## 2020-12-02 PROCEDURE — 97112 NEUROMUSCULAR REEDUCATION: CPT

## 2020-12-02 NOTE — PROGRESS NOTES
Physical Therapy Daily Treatment Note     Name: Calderon Burt Jr.  Clinic Number: [id number]    Therapy Diagnosis:   Encounter Diagnosis   Name Primary?    Acute pain of right shoulder      Physician: Ben Escoto PA*    Visit Date: 12/2/2020  Medical Diagnosis from Referral:   Diagnosis   S46.011A (ICD-10-CM) - Traumatic tear of right rotator cuff, unspecified tear extent, initial encounter   M75.21 (ICD-10-CM) - Biceps tendinitis of right upper extremity   M19.011 (ICD-10-CM) - Arthritis of right acromioclavicular joint         OPERATION:   Right  1. Shoulder arthroscopic rotator cuff repair, 6 anchor (CPT 28035, complex modifier-22)  2. Shoulder arthroscopic biceps tenodesis (CPT 71202)  3. Shoulder arthroscopic extensive debridement (anterior, posterior glenohumeral joint, subacromial space) (CPT 49994)    4. Shoulder arthroscopic subacromial decompression (CPT 70641)    Evaluation Date: 8/31/2020  Authorization Period Expiration: 08/20/2021  Plan of Care Expiration: 03/20/2021  Visit # / Visits authorized: 17/ 20     Time In: 0750  Time Out: 0905  Total Billable Time: 63 minutes    Precautions: Standard and High blood pressure  -Physical Therapy: Follow the > 3 cm cuff repair rehab protocol. PROM can start in 2 weeks. AROM starts at 6-7 weeks. No cuff resistive activity x 12 weeks. No biceps resistive activity x 8 weeks.    Subjective     Pt reports: No reports of pain today.    He was compliant with home exercise program.  Response to previous treatment: None reported   Functional change: None reported    Pain: 1/10  Location: right shoulder      Objective   DOS: 8/28/2020    Passive Range of Motion:   Shoulder Right Left   Flexion 145 170   Abduction 110 170   ER at 0 50 60   ER at 90 70 90   IR 25 50        Calderon received therapeutic exercises to develop strength, endurance, ROM and flexibility for 35 minutes including:  -UBE 10 minutes for shoulder strength and endurance  -PROM within protocol  limits for shoulder flexion, elbow flexion/ext  Supine AROM ER/IR with cues for decreased anterior shear, 2 lb for 3'  ER and IR walkouts at 0 degrees abduction 3 x 10 ea  SL ER 3 x 8 to 12 with 2 lbs    Calderon received neuromuscular re-education to develop postural control, movement patterns, and muscle sequencing for 25 minutes including:  Quadruped serratus press x 15  Quadruped hand heel rocks for SA activation 3 x 10  Prone T lvl 2 3 x 10 with focus on middle trapezius activation  Seated ER at 90 with manual inferior mobilization 3 x 15        Home Exercises Provided and Patient Education Provided     Education provided:   - Importance of post op precautions, continue AAROM for 1 more week   - Work on AAROM flexion at home and ER ROM to improve overhead mobility.      Written Home Exercises Provided: yes.  Exercises were reviewed and Calderon was able to demonstrate them prior to the end of the session.  Calderon demonstrated good  understanding of the education provided.      See EMR under Patient Instructions for exercises provided 11/30/2020.    Assessment   Slight limitations in overhead flexion and ER PROM result in limited in overhead reaching. RTC strength is improving, scapular stabilization needed with RTC exercises. Suspect extended rehabilitation time line due to the severity of the repair and conservative nature of the protocol.     Pt prognosis is Fair.     Pt will continue to benefit from skilled outpatient physical therapy to address the deficits listed in the problem list box on initial evaluation, provide pt/family education and to maximize pt's level of independence in the home and community environment.     Pt's spiritual, cultural and educational needs considered and pt agreeable to plan of care and goals.    Anticipated barriers to physical therapy: Severity of tear       GOALS: Short Term Goals:  6 weeks (met)  1.Report decreased shoulder pain < / =  2/10  to increase tolerance for  exercise  2. Assess shoulder ROM at 2 weeks post op and establish goals   3. Increased strength by 1/3 MMT grade in gross shoulder girdle to increase tolerance for ADL and work activities.  4. Pt to tolerate HEP to improve ROM and independence with ADL's     Long Term Goals: 16 weeks (progressing, not met)  1.Report decreased shouler pain  < / =  0 /10  to increase tolerance for ADLs  2.Increase AROM to equal the contralateral limb  3.Increase strength to >/= 4/5 in right shoulder to increase tolerance for ADL and work activities.  4. Pt goal: Resume exercise program  5. Pt will have improved gcode of CJ (20-40% limited) on FOTO shoulder in order to demonstrate true functional improvement.    Plan   Plan of care Certification: 8/31/2020 to 03/21/2020     Outpatient Physical Therapy 1 times weekly for 8 weeks to include the following interventions: manual therapy, therapeutic exercise, therapeutic activities, and neuromuscular re-education. Progress to 2 session a week following 8 week post op iraida.     Abhijeet Suarez, PT, DPT

## 2020-12-07 ENCOUNTER — CLINICAL SUPPORT (OUTPATIENT)
Dept: REHABILITATION | Facility: HOSPITAL | Age: 70
End: 2020-12-07
Payer: MEDICARE

## 2020-12-07 DIAGNOSIS — M25.511 ACUTE PAIN OF RIGHT SHOULDER: ICD-10-CM

## 2020-12-07 PROCEDURE — 97112 NEUROMUSCULAR REEDUCATION: CPT

## 2020-12-07 PROCEDURE — 97110 THERAPEUTIC EXERCISES: CPT

## 2020-12-07 NOTE — PROGRESS NOTES
Physical Therapy Daily Treatment Note     Name: Calderon Burt Jr.  Clinic Number: [id number]    Therapy Diagnosis:   Encounter Diagnosis   Name Primary?    Acute pain of right shoulder      Physician: Ben Escoto PA*    Visit Date: 12/7/2020  Medical Diagnosis from Referral:   Diagnosis   S46.011A (ICD-10-CM) - Traumatic tear of right rotator cuff, unspecified tear extent, initial encounter   M75.21 (ICD-10-CM) - Biceps tendinitis of right upper extremity   M19.011 (ICD-10-CM) - Arthritis of right acromioclavicular joint         OPERATION:   Right  1. Shoulder arthroscopic rotator cuff repair, 6 anchor (CPT 26060, complex modifier-22)  2. Shoulder arthroscopic biceps tenodesis (CPT 20679)  3. Shoulder arthroscopic extensive debridement (anterior, posterior glenohumeral joint, subacromial space) (CPT 85553)    4. Shoulder arthroscopic subacromial decompression (CPT 04406)    Evaluation Date: 8/31/2020  Authorization Period Expiration: 08/20/2021  Plan of Care Expiration: 03/20/2021  Visit # / Visits authorized: 18/ 20     Time In: 0745  Time Out: 0900  Total Billable Time: 60 minutes    Precautions: Standard and High blood pressure  -Physical Therapy: Follow the > 3 cm cuff repair rehab protocol. PROM can start in 2 weeks. AROM starts at 6-7 weeks. No cuff resistive activity x 12 weeks. No biceps resistive activity x 8 weeks.    Subjective     Pt reports: Mild anterior shoulder pain.   He was compliant with home exercise program.  Response to previous treatment: None reported   Functional change: None reported    Pain: 1/10  Location: right shoulder      Objective   DOS: 8/28/2020    Passive Range of Motion:   Shoulder Right Left   Flexion 145 170   Abduction 110 170   ER at 0 50 60   ER at 90 70 90   IR 25 50        Calderon received therapeutic exercises to develop strength, endurance, ROM and flexibility for 35 minutes including:  -LLLD seated shoulder inferior capsule stretch 5'  -UBE 10 minutes for  "shoulder strength and endurance  -PROM within protocol limits for shoulder flexion, elbow flexion/ext  Supine AROM ER/IR with cues for decreased anterior shear, 2 lb for 3'  ER and IR walkouts at 0 degrees abduction 3 x 10 ea  SL ER 3 x 8 to 12 with 2 lbs  Posterior capsule stretch 3 x 30"  Supine press to overhead flexion with 3 lbs, 3 x 10    Calderon received neuromuscular re-education to develop postural control, movement patterns, and muscle sequencing for 25 minutes including:  MET for end range ER  Quadruped serratus press x 15  Quadruped hand heel rocks for SA activation 3 x 10  Prone T lvl 2 3 x 10 with focus on middle trapezius activation  Seated ER at 90 with manual inferior mobilization 3 x 15  Slot machine with 5 lbs 3 x 12      Home Exercises Provided and Patient Education Provided     Education provided:   - Importance of post op precautions, continue AAROM for 1 more week   - Work on AAROM flexion at home and ER ROM to improve overhead mobility.      Written Home Exercises Provided: yes.  Exercises were reviewed and Calderon was able to demonstrate them prior to the end of the session.  Calderon demonstrated good  understanding of the education provided.      See EMR under Patient Instructions for exercises provided 11/30/2020.    Assessment   Continue to focus on RTC and periscapular strengthening. Challenged most with ER strengthening.    Pt prognosis is Fair.     Pt will continue to benefit from skilled outpatient physical therapy to address the deficits listed in the problem list box on initial evaluation, provide pt/family education and to maximize pt's level of independence in the home and community environment.     Pt's spiritual, cultural and educational needs considered and pt agreeable to plan of care and goals.    Anticipated barriers to physical therapy: Severity of tear       GOALS: Short Term Goals:  6 weeks (met)  1.Report decreased shoulder pain < / =  2/10  to increase tolerance for " exercise  2. Assess shoulder ROM at 2 weeks post op and establish goals   3. Increased strength by 1/3 MMT grade in gross shoulder girdle to increase tolerance for ADL and work activities.  4. Pt to tolerate HEP to improve ROM and independence with ADL's     Long Term Goals: 16 weeks (progressing, not met)  1.Report decreased shouler pain  < / =  0 /10  to increase tolerance for ADLs  2.Increase AROM to equal the contralateral limb  3.Increase strength to >/= 4/5 in right shoulder to increase tolerance for ADL and work activities.  4. Pt goal: Resume exercise program  5. Pt will have improved gcode of CJ (20-40% limited) on FOTO shoulder in order to demonstrate true functional improvement.    Plan   Plan of care Certification: 8/31/2020 to 03/21/2020     Outpatient Physical Therapy 1 times weekly for 8 weeks to include the following interventions: manual therapy, therapeutic exercise, therapeutic activities, and neuromuscular re-education. Progress to 2 session a week following 8 week post op iraida.     Abhijeet Suarez, PT, DPT

## 2020-12-09 ENCOUNTER — CLINICAL SUPPORT (OUTPATIENT)
Dept: REHABILITATION | Facility: HOSPITAL | Age: 70
End: 2020-12-09
Payer: MEDICARE

## 2020-12-09 DIAGNOSIS — M25.511 ACUTE PAIN OF RIGHT SHOULDER: ICD-10-CM

## 2020-12-09 PROCEDURE — 97110 THERAPEUTIC EXERCISES: CPT

## 2020-12-09 PROCEDURE — 97112 NEUROMUSCULAR REEDUCATION: CPT

## 2020-12-09 NOTE — PROGRESS NOTES
Physical Therapy Daily Treatment Note     Name: Calderon Burt Jr.  Clinic Number: [id number]    Therapy Diagnosis:   Encounter Diagnosis   Name Primary?    Acute pain of right shoulder      Physician: Ben Escoto PA*    Visit Date: 12/9/2020  Medical Diagnosis from Referral:   Diagnosis   S46.011A (ICD-10-CM) - Traumatic tear of right rotator cuff, unspecified tear extent, initial encounter   M75.21 (ICD-10-CM) - Biceps tendinitis of right upper extremity   M19.011 (ICD-10-CM) - Arthritis of right acromioclavicular joint         OPERATION:   Right  1. Shoulder arthroscopic rotator cuff repair, 6 anchor (CPT 58962, complex modifier-22)  2. Shoulder arthroscopic biceps tenodesis (CPT 08513)  3. Shoulder arthroscopic extensive debridement (anterior, posterior glenohumeral joint, subacromial space) (CPT 69222)    4. Shoulder arthroscopic subacromial decompression (CPT 76297)    Evaluation Date: 8/31/2020  Authorization Period Expiration: 08/20/2021  Plan of Care Expiration: 03/20/2021  Visit # / Visits authorized: 18/ 20     Time In: 0745  Time Out: 0855  Total Billable Time: 58 minutes    Precautions: Standard and High blood pressure  -Physical Therapy: Follow the > 3 cm cuff repair rehab protocol. PROM can start in 2 weeks. AROM starts at 6-7 weeks. No cuff resistive activity x 12 weeks. No biceps resistive activity x 8 weeks.    Subjective     Pt reports: Mild anterior shoulder pain continues.   He was compliant with home exercise program.  Response to previous treatment: None reported   Functional change: None reported    Pain: 1/10  Location: right shoulder      Objective   DOS: 8/28/2020    Passive Range of Motion:   Shoulder Right Left   Flexion 160 170   Abduction 140 170   ER at 0 50 60   ER at 90 80 90   IR 25 50        Calderon received therapeutic exercises to develop strength, endurance, ROM and flexibility for 35 minutes including:  -UBE 10 minutes for shoulder strength and endurance  -PROM within  "protocol limits for shoulder flexion, elbow flexion/ext  Supine AROM ER/IR with cues for decreased anterior shear, 3 lb for 3'  ER and IR walkouts at 0 degrees abduction 3 x 10 ea  SL ER 3 x 8 to 12 with 2 lbs  Posterior capsule stretch 3 x 30"  Supine press to overhead flexion with 3 lbs, 3 x 10  Lat stretch 3" x 20    Calderon received neuromuscular re-education to develop postural control, movement patterns, and muscle sequencing for 23 minutes including:  MET for end range ER  Quadruped hand heel rocks for SA activation 3 x 10  Prone T lvl 2 3 x 10 with focus on middle trapezius activation  Seated ER at 90 with manual inferior mobilization 3 x 15  SL Flexion x 20      Home Exercises Provided and Patient Education Provided     Education provided:   - Importance of post op precautions, continue AAROM for 1 more week   - Work on AAROM flexion at home and ER ROM to improve overhead mobility.      Written Home Exercises Provided: yes.  Exercises were reviewed and Calderon was able to demonstrate them prior to the end of the session.  Calderon demonstrated good  understanding of the education provided.      See EMR under Patient Instructions for exercises provided 11/30/2020.    Assessment   Progressing with AROM and PROM. Most limited with shoulder flexion due to insufficient inferior glide. Continuing to progress in this area. Posterior capsule mobility has improved.    Pt prognosis is Fair.     Pt will continue to benefit from skilled outpatient physical therapy to address the deficits listed in the problem list box on initial evaluation, provide pt/family education and to maximize pt's level of independence in the home and community environment.     Pt's spiritual, cultural and educational needs considered and pt agreeable to plan of care and goals.    Anticipated barriers to physical therapy: Severity of tear       GOALS: Short Term Goals:  6 weeks (met)  1.Report decreased shoulder pain < / =  2/10  to increase " tolerance for exercise  2. Assess shoulder ROM at 2 weeks post op and establish goals   3. Increased strength by 1/3 MMT grade in gross shoulder girdle to increase tolerance for ADL and work activities.  4. Pt to tolerate HEP to improve ROM and independence with ADL's     Long Term Goals: 16 weeks (progressing, not met)  1.Report decreased shouler pain  < / =  0 /10  to increase tolerance for ADLs  2.Increase AROM to equal the contralateral limb  3.Increase strength to >/= 4/5 in right shoulder to increase tolerance for ADL and work activities.  4. Pt goal: Resume exercise program  5. Pt will have improved gcode of CJ (20-40% limited) on FOTO shoulder in order to demonstrate true functional improvement.    Plan   Plan of care Certification: 8/31/2020 to 03/21/2020     Outpatient Physical Therapy 1 times weekly for 8 weeks to include the following interventions: manual therapy, therapeutic exercise, therapeutic activities, and neuromuscular re-education. Progress to 2 session a week following 8 week post op iraida.     Abhijeet Suarez, PT, DPT

## 2020-12-14 ENCOUNTER — CLINICAL SUPPORT (OUTPATIENT)
Dept: REHABILITATION | Facility: HOSPITAL | Age: 70
End: 2020-12-14
Payer: MEDICARE

## 2020-12-14 DIAGNOSIS — M25.511 ACUTE PAIN OF RIGHT SHOULDER: ICD-10-CM

## 2020-12-14 PROCEDURE — 97112 NEUROMUSCULAR REEDUCATION: CPT

## 2020-12-14 PROCEDURE — 97110 THERAPEUTIC EXERCISES: CPT

## 2020-12-14 NOTE — PROGRESS NOTES
"  Physical Therapy Daily Treatment Note     Name: Calderon Burt Jr.  Clinic Number: [id number]    Therapy Diagnosis:   Encounter Diagnosis   Name Primary?    Acute pain of right shoulder      Physician: Ben Escoto PA*    Visit Date: 12/14/2020  Medical Diagnosis from Referral:   Diagnosis   S46.011A (ICD-10-CM) - Traumatic tear of right rotator cuff, unspecified tear extent, initial encounter   M75.21 (ICD-10-CM) - Biceps tendinitis of right upper extremity   M19.011 (ICD-10-CM) - Arthritis of right acromioclavicular joint         OPERATION:   Right  1. Shoulder arthroscopic rotator cuff repair, 6 anchor (CPT 35000, complex modifier-22)  2. Shoulder arthroscopic biceps tenodesis (CPT 74521)  3. Shoulder arthroscopic extensive debridement (anterior, posterior glenohumeral joint, subacromial space) (CPT 71861)    4. Shoulder arthroscopic subacromial decompression (CPT 93127)    Evaluation Date: 8/31/2020  Authorization Period Expiration: 08/20/2021  Plan of Care Expiration: 03/20/2021  Visit # / Visits authorized: 19/ 20     Time In: 0745  Time Out: 0905  Total Billable Time: 60 minutes    Precautions: Standard and High blood pressure  -Physical Therapy: Follow the > 3 cm cuff repair rehab protocol. PROM can start in 2 weeks. AROM starts at 6-7 weeks. No cuff resistive activity x 12 weeks. No biceps resistive activity x 8 weeks.    Subjective     Pt reports: "I don't really have any pain."  He was compliant with home exercise program.  Response to previous treatment: None reported   Functional change: None reported    Pain: 1/10  Location: right shoulder      Objective   DOS: 8/28/2020    Passive Range of Motion:   Shoulder Right Left   Flexion 160 170   Abduction 145 170   ER at 0 50 60   ER at 90 85 90   IR 25 50        Calderon received therapeutic exercises to develop strength, endurance, ROM and flexibility for 35 minutes including:  -UBE 10 minutes for shoulder strength and endurance  -PROM within protocol " "limits for shoulder flexion, elbow flexion/ext  Supine AROM ER/IR with cues for decreased anterior shear, 3 lb for 3'  ER and IR OTB walkouts at 0 degrees abduction 3 x 10 ea  SL ER 3 x 8 to 12 with 2 lbs  Posterior capsule stretch 3 x 30"  Wall slide with YTB 3 x 8  Supine press to overhead flexion with 3 lbs, 3 x 10  Lat stretch 3" x 20    Calderon received neuromuscular re-education to develop postural control, movement patterns, and muscle sequencing for 25 minutes including:  MET for end range ER  Quadruped hand heel rocks for SA activation 3 x 10  Prone T lvl 2 3 x 10 with focus on middle trapezius activation  Seated ER at 90 with manual inferior mobilization 3 x 15  Quadruped RTB star 3 x 10      Home Exercises Provided and Patient Education Provided     Education provided:   - Importance of post op precautions, continue AAROM for 1 more week   - Work on AAROM flexion at home and ER ROM to improve overhead mobility.      Written Home Exercises Provided: yes.  Exercises were reviewed and Calderon was able to demonstrate them prior to the end of the session.  Calderon demonstrated good  understanding of the education provided.      See EMR under Patient Instructions for exercises provided 11/30/2020.    Assessment   Pain is decreasing. Scapulohumeral muscles remain guarded limiting overhead motion. Rotator strength continuing to progress. Will require additional visits above and beyond his initial 20 authorized due to the severity of his surgery.     Pt prognosis is Fair.     Pt will continue to benefit from skilled outpatient physical therapy to address the deficits listed in the problem list box on initial evaluation, provide pt/family education and to maximize pt's level of independence in the home and community environment.     Pt's spiritual, cultural and educational needs considered and pt agreeable to plan of care and goals.    Anticipated barriers to physical therapy: Severity of tear       GOALS: Short Term " Goals:  6 weeks (met)  1.Report decreased shoulder pain < / =  2/10  to increase tolerance for exercise  2. Assess shoulder ROM at 2 weeks post op and establish goals   3. Increased strength by 1/3 MMT grade in gross shoulder girdle to increase tolerance for ADL and work activities.  4. Pt to tolerate HEP to improve ROM and independence with ADL's     Long Term Goals: 16 weeks (progressing, not met)  1.Report decreased shouler pain  < / =  0 /10  to increase tolerance for ADLs  2.Increase AROM to equal the contralateral limb  3.Increase strength to >/= 4/5 in right shoulder to increase tolerance for ADL and work activities.  4. Pt goal: Resume exercise program  5. Pt will have improved gcode of CJ (20-40% limited) on FOTO shoulder in order to demonstrate true functional improvement.    Plan   Plan of care Certification: 8/31/2020 to 03/21/2020     Outpatient Physical Therapy 1 times weekly for 8 weeks to include the following interventions: manual therapy, therapeutic exercise, therapeutic activities, and neuromuscular re-education. Progress to 2 session a week following 8 week post op iraida.     Abhijeet Suarez, PT, DPT

## 2020-12-16 ENCOUNTER — CLINICAL SUPPORT (OUTPATIENT)
Dept: REHABILITATION | Facility: HOSPITAL | Age: 70
End: 2020-12-16
Payer: MEDICARE

## 2020-12-16 DIAGNOSIS — M25.511 ACUTE PAIN OF RIGHT SHOULDER: ICD-10-CM

## 2020-12-16 PROCEDURE — 97112 NEUROMUSCULAR REEDUCATION: CPT

## 2020-12-16 PROCEDURE — 97110 THERAPEUTIC EXERCISES: CPT

## 2020-12-16 PROCEDURE — 97140 MANUAL THERAPY 1/> REGIONS: CPT

## 2020-12-17 NOTE — PROGRESS NOTES
Physical Therapy Daily Treatment Note     Name: Calderon Burt Jr.  Clinic Number: [id number]    Therapy Diagnosis:   Encounter Diagnosis   Name Primary?    Acute pain of right shoulder      Physician: Ben Escoto PA*    Visit Date: 12/16/2020  Medical Diagnosis from Referral:   Diagnosis   S46.011A (ICD-10-CM) - Traumatic tear of right rotator cuff, unspecified tear extent, initial encounter   M75.21 (ICD-10-CM) - Biceps tendinitis of right upper extremity   M19.011 (ICD-10-CM) - Arthritis of right acromioclavicular joint         OPERATION:   Right  1. Shoulder arthroscopic rotator cuff repair, 6 anchor (CPT 54823, complex modifier-22)  2. Shoulder arthroscopic biceps tenodesis (CPT 39811)  3. Shoulder arthroscopic extensive debridement (anterior, posterior glenohumeral joint, subacromial space) (CPT 92012)    4. Shoulder arthroscopic subacromial decompression (CPT 98860)    Evaluation Date: 8/31/2020  Authorization Period Expiration: 08/20/2021  Plan of Care Expiration: 03/20/2021  Visit # / Visits authorized: 19/ 20     Time In: 0830  Time Out: 0930  Total Billable Time: 55 minutes    Precautions: Standard and High blood pressure  -Physical Therapy: Follow the > 3 cm cuff repair rehab protocol. PROM can start in 2 weeks. AROM starts at 6-7 weeks. No cuff resistive activity x 12 weeks. No biceps resistive activity x 8 weeks.    Subjective     Pt reports: he was able to ride his motor cycle for a short drive over the weekend.   He was compliant with home exercise program.  Response to previous treatment: None reported   Functional change: None reported    Pain: 1/10  Location: right shoulder      Objective   DOS: 8/28/2020    Passive Range of Motion:   Shoulder Right Left   Flexion 160 170   Abduction 145 170   ER at 0 50 60   ER at 90 85 90   IR 25 50        Calderon received therapeutic exercises to develop strength, endurance, ROM and flexibility for 35 minutes including:  -UBE 10 minutes for shoulder  "strength and endurance  -PROM within protocol limits for shoulder flexion, elbow flexion/ext  Supine AROM ER/IR with cues for decreased anterior shear, 3 lb for 3'  ER and IR OTB walkouts at 0 degrees abduction 3 x 10 ea  SL ER 3 x 8 to 12 with 2 lbs  Posterior capsule stretch 3 x 30"  Wall slide with YTB 3 x 8  Supine press to overhead flexion with 3 lbs, 3 x 10  Overhead alphabets 2 lb ball      Calderon received neuromuscular re-education to develop postural control, movement patterns, and muscle sequencing for 20 minutes including:  MET for end range ER  Quadruped hand heel rocks for SA activation 3 x 10  Prone T lvl 2 3 x 10 with focus on middle trapezius activation  Seated ER at 90 with manual inferior mobilization 3 x 15  Standing ER with arm on ball 30x    Home Exercises Provided and Patient Education Provided     Education provided:   - Importance of post op precautions, continue AAROM for 1 more week   - Work on AAROM flexion at home and ER ROM to improve overhead mobility.      Written Home Exercises Provided: yes.  Exercises were reviewed and Calderon was able to demonstrate them prior to the end of the session.  Calderon demonstrated good  understanding of the education provided.      See EMR under Patient Instructions for exercises provided 11/30/2020.    Assessment   Patient is progressing with AROM and functional activities. Continues to show mobility deficits due to the severity of his surgery and subsequently conservative post op protocol. ROM is improving but he will require continued treatment to achieve full functional use of the limb. Has been able progress with RTC and scapular strengthening in his available range.    Pt prognosis is Fair.     Pt will continue to benefit from skilled outpatient physical therapy to address the deficits listed in the problem list box on initial evaluation, provide pt/family education and to maximize pt's level of independence in the home and community environment. "     Pt's spiritual, cultural and educational needs considered and pt agreeable to plan of care and goals.    Anticipated barriers to physical therapy: Severity of tear       GOALS: Short Term Goals:  6 weeks (met)  1.Report decreased shoulder pain < / =  2/10  to increase tolerance for exercise  2. Assess shoulder ROM at 2 weeks post op and establish goals   3. Increased strength by 1/3 MMT grade in gross shoulder girdle to increase tolerance for ADL and work activities.  4. Pt to tolerate HEP to improve ROM and independence with ADL's     Long Term Goals: 16 weeks (progressing, not met)  1.Report decreased shouler pain  < / =  0 /10  to increase tolerance for ADLs  2.Increase AROM to equal the contralateral limb  3.Increase strength to >/= 4/5 in right shoulder to increase tolerance for ADL and work activities.  4. Pt goal: Resume exercise program  5. Pt will have improved gcode of CJ (20-40% limited) on FOTO shoulder in order to demonstrate true functional improvement.    Plan   Plan of care Certification: 8/31/2020 to 03/21/2020     Outpatient Physical Therapy 2 times weekly for 6 weeks to include the following interventions: manual therapy, therapeutic exercise, therapeutic activities, and neuromuscular re-education.   Abhijeet Suarez, PT, DPT

## 2020-12-17 NOTE — PLAN OF CARE
Physical Therapy Daily Treatment Note     Name: Calderon Burt Jr.  Clinic Number: [id number]    Therapy Diagnosis:   Encounter Diagnosis   Name Primary?    Acute pain of right shoulder      Physician: Ben Escoto PA*    Visit Date: 12/16/2020  Medical Diagnosis from Referral:   Diagnosis   S46.011A (ICD-10-CM) - Traumatic tear of right rotator cuff, unspecified tear extent, initial encounter   M75.21 (ICD-10-CM) - Biceps tendinitis of right upper extremity   M19.011 (ICD-10-CM) - Arthritis of right acromioclavicular joint         OPERATION:   Right  1. Shoulder arthroscopic rotator cuff repair, 6 anchor (CPT 65685, complex modifier-22)  2. Shoulder arthroscopic biceps tenodesis (CPT 38197)  3. Shoulder arthroscopic extensive debridement (anterior, posterior glenohumeral joint, subacromial space) (CPT 73738)    4. Shoulder arthroscopic subacromial decompression (CPT 72394)    Evaluation Date: 8/31/2020  Authorization Period Expiration: 08/20/2021  Plan of Care Expiration: 03/20/2021  Visit # / Visits authorized: 19/ 20     Time In: 0830  Time Out: 0930  Total Billable Time: 55 minutes    Precautions: Standard and High blood pressure  -Physical Therapy: Follow the > 3 cm cuff repair rehab protocol. PROM can start in 2 weeks. AROM starts at 6-7 weeks. No cuff resistive activity x 12 weeks. No biceps resistive activity x 8 weeks.    Subjective     Pt reports: he was able to ride his motor cycle for a short drive over the weekend.   He was compliant with home exercise program.  Response to previous treatment: None reported   Functional change: None reported    Pain: 1/10  Location: right shoulder      Objective   DOS: 8/28/2020    Passive Range of Motion:   Shoulder Right Left   Flexion 160 170   Abduction 145 170   ER at 0 50 60   ER at 90 85 90   IR 25 50        Calderon received therapeutic exercises to develop strength, endurance, ROM and flexibility for 35 minutes including:  -UBE 10 minutes for shoulder  "strength and endurance  -PROM within protocol limits for shoulder flexion, elbow flexion/ext  Supine AROM ER/IR with cues for decreased anterior shear, 3 lb for 3'  ER and IR OTB walkouts at 0 degrees abduction 3 x 10 ea  SL ER 3 x 8 to 12 with 2 lbs  Posterior capsule stretch 3 x 30"  Wall slide with YTB 3 x 8  Supine press to overhead flexion with 3 lbs, 3 x 10  Overhead alphabets 2 lb ball      Calderon received neuromuscular re-education to develop postural control, movement patterns, and muscle sequencing for 20 minutes including:  MET for end range ER  Quadruped hand heel rocks for SA activation 3 x 10  Prone T lvl 2 3 x 10 with focus on middle trapezius activation  Seated ER at 90 with manual inferior mobilization 3 x 15  Standing ER with arm on ball 30x    Home Exercises Provided and Patient Education Provided     Education provided:   - Importance of post op precautions, continue AAROM for 1 more week   - Work on AAROM flexion at home and ER ROM to improve overhead mobility.      Written Home Exercises Provided: yes.  Exercises were reviewed and Calderon was able to demonstrate them prior to the end of the session.  Calderon demonstrated good  understanding of the education provided.      See EMR under Patient Instructions for exercises provided 11/30/2020.    Assessment   Patient is progressing with AROM and functional activities. Continues to show mobility deficits due to the severity of his surgery and subsequently conservative post op protocol. ROM is improving but he will require continued treatment to achieve full functional use of the limb. Has been able progress with RTC and scapular strengthening in his available range.    Pt prognosis is Fair.     Pt will continue to benefit from skilled outpatient physical therapy to address the deficits listed in the problem list box on initial evaluation, provide pt/family education and to maximize pt's level of independence in the home and community environment. "     Pt's spiritual, cultural and educational needs considered and pt agreeable to plan of care and goals.    Anticipated barriers to physical therapy: Severity of tear       GOALS: Short Term Goals:  6 weeks (met)  1.Report decreased shoulder pain < / =  2/10  to increase tolerance for exercise  2. Assess shoulder ROM at 2 weeks post op and establish goals   3. Increased strength by 1/3 MMT grade in gross shoulder girdle to increase tolerance for ADL and work activities.  4. Pt to tolerate HEP to improve ROM and independence with ADL's     Long Term Goals: 16 weeks (progressing, not met)  1.Report decreased shouler pain  < / =  0 /10  to increase tolerance for ADLs  2.Increase AROM to equal the contralateral limb  3.Increase strength to >/= 4/5 in right shoulder to increase tolerance for ADL and work activities.  4. Pt goal: Resume exercise program  5. Pt will have improved gcode of CJ (20-40% limited) on FOTO shoulder in order to demonstrate true functional improvement.    Plan   Plan of care Certification: 8/31/2020 to 03/21/2020     Outpatient Physical Therapy 2 times weekly for 6 weeks to include the following interventions: manual therapy, therapeutic exercise, therapeutic activities, and neuromuscular re-education.   Abhijeet Suarez, PT, DPT

## 2020-12-21 ENCOUNTER — CLINICAL SUPPORT (OUTPATIENT)
Dept: REHABILITATION | Facility: HOSPITAL | Age: 70
End: 2020-12-21
Payer: MEDICARE

## 2020-12-21 DIAGNOSIS — M25.511 ACUTE PAIN OF RIGHT SHOULDER: ICD-10-CM

## 2020-12-21 PROCEDURE — 97112 NEUROMUSCULAR REEDUCATION: CPT

## 2020-12-21 PROCEDURE — 97110 THERAPEUTIC EXERCISES: CPT

## 2020-12-21 NOTE — PLAN OF CARE
Physical Therapy Daily Treatment Note     Name: Calderon Burt Jr.  Clinic Number: [id number]    Therapy Diagnosis:   Encounter Diagnosis   Name Primary?    Acute pain of right shoulder      Physician: Ben Escoto PA*    Visit Date: 12/21/2020  Medical Diagnosis from Referral:   Diagnosis   S46.011A (ICD-10-CM) - Traumatic tear of right rotator cuff, unspecified tear extent, initial encounter   M75.21 (ICD-10-CM) - Biceps tendinitis of right upper extremity   M19.011 (ICD-10-CM) - Arthritis of right acromioclavicular joint         OPERATION:   Right  1. Shoulder arthroscopic rotator cuff repair, 6 anchor (CPT 40070, complex modifier-22)  2. Shoulder arthroscopic biceps tenodesis (CPT 43533)  3. Shoulder arthroscopic extensive debridement (anterior, posterior glenohumeral joint, subacromial space) (CPT 77505)    4. Shoulder arthroscopic subacromial decompression (CPT 40582)    Evaluation Date: 8/31/2020  Authorization Period Expiration: 08/20/2021  Plan of Care Expiration: 03/20/2021  Visit # / Visits authorized: 20/ 20     Time In: 0748  Time Out: 0830  Total Billable Time: 45 minutes    Precautions: Standard and High blood pressure  -Physical Therapy: Follow the > 3 cm cuff repair rehab protocol. PROM can start in 2 weeks. AROM starts at 6-7 weeks. No cuff resistive activity x 12 weeks. No biceps resistive activity x 8 weeks.    Subjective     Pt reports: Increased pain following push ups this weekend. Pain lasted for 1 day and improved with rest.  He was compliant with home exercise program.  Response to previous treatment: None reported   Functional change: None reported    Pain: 1/10  Location: right shoulder      Objective   DOS: 8/28/2020    Passive Range of Motion:   Shoulder Right Left   Flexion 160 170   Abduction 145 170   ER at 0 50 60   ER at 90 85 90   IR 25 50        Calderon received therapeutic exercises to develop strength, endurance, ROM and flexibility for 35 minutes including:  -UBE 10  "minutes for shoulder strength and endurance  -PROM within protocol limits for shoulder flexion, elbow flexion/ext  Supine AROM ER/IR with cues for decreased anterior shear, 3 lb for 3'  ER and IR OTB walkouts at 0 degrees abduction 3 x 10 ea  SL ER 3 x 8 to 12 with 3 lbs  Posterior capsule stretch 3 x 30"  Supine press to overhead flexion with 3 lbs, 3 x 10  Overhead alphabets 2 lb ball      Calderon received neuromuscular re-education to develop postural control, movement patterns, and muscle sequencing for 10 minutes including:  MET for end range ER  Quadruped stars with RTB 3 x 6  Prone T lvl 2 3 x 10 with focus on middle trapezius activation      Home Exercises Provided and Patient Education Provided     Education provided:   - Importance of post op precautions, continue AAROM for 1 more week   - Work on AAROM flexion at home and ER ROM to improve overhead mobility.      Written Home Exercises Provided: yes.  Exercises were reviewed and Calderon was able to demonstrate them prior to the end of the session.  Calderon demonstrated good  understanding of the education provided.      See EMR under Patient Instructions for exercises provided 11/30/2020.    Assessment   Continues to show deficits in rotator cuff strength and active flexion which limit his ability to complete functional reaching tasks. Able to progress with periscapular strengthening. Patient will continue to benefit from skilled therapeutic intervention to improve rotator cuff strength and ROM. Extended course of treatment required due to severity of tear and subsequently conservative protocol.    Pt prognosis is Fair.     Pt will continue to benefit from skilled outpatient physical therapy to address the deficits listed in the problem list box on initial evaluation, provide pt/family education and to maximize pt's level of independence in the home and community environment.     Pt's spiritual, cultural and educational needs considered and pt agreeable to " plan of care and goals.    Anticipated barriers to physical therapy: Severity of tear       GOALS: Short Term Goals:  6 weeks (met)  1.Report decreased shoulder pain < / =  2/10  to increase tolerance for exercise  2. Assess shoulder ROM at 2 weeks post op and establish goals   3. Increased strength by 1/3 MMT grade in gross shoulder girdle to increase tolerance for ADL and work activities.  4. Pt to tolerate HEP to improve ROM and independence with ADL's     Long Term Goals: 16 weeks (progressing, not met)  1.Report decreased shouler pain  < / =  0 /10  to increase tolerance for ADLs  2.Increase AROM to equal the contralateral limb  3.Increase strength to >/= 4/5 in right shoulder to increase tolerance for ADL and work activities.  4. Pt goal: Resume exercise program  5. Pt will have improved gcode of CJ (20-40% limited) on FOTO shoulder in order to demonstrate true functional improvement.    Plan     Outpatient Physical Therapy 2 times weekly for and additional 4 weeks to include the following interventions: manual therapy, therapeutic exercise, therapeutic activities, and neuromuscular re-education.   Abhijeet Suarez, PT, DPT

## 2020-12-21 NOTE — PROGRESS NOTES
Physical Therapy Daily Treatment Note     Name: Calderon Burt Jr.  Clinic Number: [id number]    Therapy Diagnosis:   Encounter Diagnosis   Name Primary?    Acute pain of right shoulder      Physician: Ben Escoto PA*    Visit Date: 12/21/2020  Medical Diagnosis from Referral:   Diagnosis   S46.011A (ICD-10-CM) - Traumatic tear of right rotator cuff, unspecified tear extent, initial encounter   M75.21 (ICD-10-CM) - Biceps tendinitis of right upper extremity   M19.011 (ICD-10-CM) - Arthritis of right acromioclavicular joint         OPERATION:   Right  1. Shoulder arthroscopic rotator cuff repair, 6 anchor (CPT 04515, complex modifier-22)  2. Shoulder arthroscopic biceps tenodesis (CPT 70594)  3. Shoulder arthroscopic extensive debridement (anterior, posterior glenohumeral joint, subacromial space) (CPT 99605)    4. Shoulder arthroscopic subacromial decompression (CPT 61481)    Evaluation Date: 8/31/2020  Authorization Period Expiration: 08/20/2021  Plan of Care Expiration: 03/20/2021  Visit # / Visits authorized: 20/ 20     Time In: 0748  Time Out: 0830  Total Billable Time: 45 minutes    Precautions: Standard and High blood pressure  -Physical Therapy: Follow the > 3 cm cuff repair rehab protocol. PROM can start in 2 weeks. AROM starts at 6-7 weeks. No cuff resistive activity x 12 weeks. No biceps resistive activity x 8 weeks.    Subjective     Pt reports: Increased pain following push ups this weekend. Pain lasted for 1 day and improved with rest.  He was compliant with home exercise program.  Response to previous treatment: None reported   Functional change: None reported    Pain: 1/10  Location: right shoulder      Objective   DOS: 8/28/2020    Passive Range of Motion:   Shoulder Right Left   Flexion 160 170   Abduction 145 170   ER at 0 50 60   ER at 90 85 90   IR 25 50        Calderon received therapeutic exercises to develop strength, endurance, ROM and flexibility for 35 minutes including:  -UBE 10  "minutes for shoulder strength and endurance  -PROM within protocol limits for shoulder flexion, elbow flexion/ext  Supine AROM ER/IR with cues for decreased anterior shear, 3 lb for 3'  ER and IR OTB walkouts at 0 degrees abduction 3 x 10 ea  SL ER 3 x 8 to 12 with 3 lbs  Posterior capsule stretch 3 x 30"  Supine press to overhead flexion with 3 lbs, 3 x 10  Overhead alphabets 2 lb ball      Calderon received neuromuscular re-education to develop postural control, movement patterns, and muscle sequencing for 10 minutes including:  MET for end range ER  Quadruped stars with RTB 3 x 6  Prone T lvl 2 3 x 10 with focus on middle trapezius activation      Home Exercises Provided and Patient Education Provided     Education provided:   - Importance of post op precautions, continue AAROM for 1 more week   - Work on AAROM flexion at home and ER ROM to improve overhead mobility.      Written Home Exercises Provided: yes.  Exercises were reviewed and Calderon was able to demonstrate them prior to the end of the session.  Calderon demonstrated good  understanding of the education provided.      See EMR under Patient Instructions for exercises provided 11/30/2020.    Assessment   Continues to show deficits in rotator cuff strength and active flexion which limit his ability to complete functional reaching tasks. Able to progress with periscapular strengthening. Patient will continue to benefit from skilled therapeutic intervention to improve rotator cuff strength and ROM. Extended course of treatment required due to severity of tear and subsequently conservative protocol.    Pt prognosis is Fair.     Pt will continue to benefit from skilled outpatient physical therapy to address the deficits listed in the problem list box on initial evaluation, provide pt/family education and to maximize pt's level of independence in the home and community environment.     Pt's spiritual, cultural and educational needs considered and pt agreeable to " plan of care and goals.    Anticipated barriers to physical therapy: Severity of tear       GOALS: Short Term Goals:  6 weeks (met)  1.Report decreased shoulder pain < / =  2/10  to increase tolerance for exercise  2. Assess shoulder ROM at 2 weeks post op and establish goals   3. Increased strength by 1/3 MMT grade in gross shoulder girdle to increase tolerance for ADL and work activities.  4. Pt to tolerate HEP to improve ROM and independence with ADL's     Long Term Goals: 16 weeks (progressing, not met)  1.Report decreased shouler pain  < / =  0 /10  to increase tolerance for ADLs  2.Increase AROM to equal the contralateral limb  3.Increase strength to >/= 4/5 in right shoulder to increase tolerance for ADL and work activities.  4. Pt goal: Resume exercise program  5. Pt will have improved gcode of CJ (20-40% limited) on FOTO shoulder in order to demonstrate true functional improvement.    Plan   Plan of care Certification: 8/31/2020 to 03/21/2020     Outpatient Physical Therapy 2 times weekly for 6 weeks to include the following interventions: manual therapy, therapeutic exercise, therapeutic activities, and neuromuscular re-education.   Abhijeet Suarez, PT, DPT

## 2020-12-30 ENCOUNTER — CLINICAL SUPPORT (OUTPATIENT)
Dept: REHABILITATION | Facility: HOSPITAL | Age: 70
End: 2020-12-30
Payer: MEDICARE

## 2020-12-30 DIAGNOSIS — M25.511 ACUTE PAIN OF RIGHT SHOULDER: ICD-10-CM

## 2020-12-30 PROCEDURE — 97112 NEUROMUSCULAR REEDUCATION: CPT

## 2020-12-30 PROCEDURE — 97110 THERAPEUTIC EXERCISES: CPT

## 2020-12-30 NOTE — PROGRESS NOTES
Physical Therapy Daily Treatment Note     Name: Calderon Burt Jr.  Clinic Number: [id number]    Therapy Diagnosis:   Encounter Diagnosis   Name Primary?    Acute pain of right shoulder      Physician: Ben Escoto PA*    Visit Date: 12/30/2020  Medical Diagnosis from Referral:   Diagnosis   S46.011A (ICD-10-CM) - Traumatic tear of right rotator cuff, unspecified tear extent, initial encounter   M75.21 (ICD-10-CM) - Biceps tendinitis of right upper extremity   M19.011 (ICD-10-CM) - Arthritis of right acromioclavicular joint         OPERATION:   Right  1. Shoulder arthroscopic rotator cuff repair, 6 anchor (CPT 94359, complex modifier-22)  2. Shoulder arthroscopic biceps tenodesis (CPT 97210)  3. Shoulder arthroscopic extensive debridement (anterior, posterior glenohumeral joint, subacromial space) (CPT 68324)    4. Shoulder arthroscopic subacromial decompression (CPT 82672)    Evaluation Date: 8/31/2020  Authorization Period Expiration: 08/20/2021  Plan of Care Expiration: 03/20/2021  Visit # / Visits authorized: 1/ 1     Time In: 0825  Time Out: 0925  Total Billable Time: 55 minutes    Precautions: Standard and High blood pressure  -Physical Therapy: Follow the > 3 cm cuff repair rehab protocol. PROM can start in 2 weeks. AROM starts at 6-7 weeks. No cuff resistive activity x 12 weeks. No biceps resistive activity x 8 weeks.    Subjective     Pt reports: Missed his previous 2 sessions because he son passed away. Was able to complete his HEP and continues to slowly feel better.  He was compliant with home exercise program.  Response to previous treatment: None reported   Functional change: None reported    Pain: 1/10  Location: right shoulder      Objective   DOS: 8/28/2020    Passive Range of Motion:   Shoulder Right Left   Flexion 160 170   Abduction 145 170   ER at 0 50 60   ER at 90 85 90   IR 25 50        Calderon received therapeutic exercises to develop strength, endurance, ROM and flexibility for 45  "minutes including:  -UBE 10 minutes for shoulder strength and endurance  -PROM within protocol limits for shoulder flexion, elbow flexion/ext  Supine AROM ER/IR with cues for decreased anterior shear, 3 lb for 3'  [place] with 3 lbs 3 x burn  Posterior capsule stretch 3 x 30"  Supine press to overhead flexion with 3 lbs, 3 x 10  Overhead alphabets 3 x ea  Prone T and Y lvl 3, 3 x 8      Calderon received neuromuscular re-education to develop postural control, movement patterns, and muscle sequencing for 10 minutes including:  MET for end range ER  SL ER with manual resistance         Home Exercises Provided and Patient Education Provided     Education provided:   - Importance of post op precautions, continue AAROM for 1 more week   - Work on AAROM flexion at home and ER ROM to improve overhead mobility.      Written Home Exercises Provided: yes.  Exercises were reviewed and Calderon was able to demonstrate them prior to the end of the session.  Calderon demonstrated good  understanding of the education provided.      See EMR under Patient Instructions for exercises provided 11/30/2020.    Assessment   Progressing in RTC and periscapular strength. End range flexion and ER deficits persist which limit functional reaching but this is improving. Requires further skilled intervention to resume full functional activities with the shoulder. Extended rehabilitation is required due to the size of the repair.     Pt prognosis is Fair.     Pt will continue to benefit from skilled outpatient physical therapy to address the deficits listed in the problem list box on initial evaluation, provide pt/family education and to maximize pt's level of independence in the home and community environment.     Pt's spiritual, cultural and educational needs considered and pt agreeable to plan of care and goals.    Anticipated barriers to physical therapy: Severity of tear       GOALS: Short Term Goals:  6 weeks (met)  1.Report decreased shoulder " pain < / =  2/10  to increase tolerance for exercise  2. Assess shoulder ROM at 2 weeks post op and establish goals   3. Increased strength by 1/3 MMT grade in gross shoulder girdle to increase tolerance for ADL and work activities.  4. Pt to tolerate HEP to improve ROM and independence with ADL's     Long Term Goals: 16 weeks (progressing, not met)  1.Report decreased shouler pain  < / =  0 /10  to increase tolerance for ADLs  2.Increase AROM to equal the contralateral limb  3.Increase strength to >/= 4/5 in right shoulder to increase tolerance for ADL and work activities.  4. Pt goal: Resume exercise program  5. Pt will have improved gcode of CJ (20-40% limited) on FOTO shoulder in order to demonstrate true functional improvement.    Plan   Plan of care Certification: 8/31/2020 to 03/21/2020     Outpatient Physical Therapy 2 times weekly for 6 weeks to include the following interventions: manual therapy, therapeutic exercise, therapeutic activities, and neuromuscular re-education.   Abhijeet Suarez, PT, DPT

## 2021-01-04 ENCOUNTER — CLINICAL SUPPORT (OUTPATIENT)
Dept: REHABILITATION | Facility: HOSPITAL | Age: 71
End: 2021-01-04
Payer: MEDICARE

## 2021-01-04 DIAGNOSIS — M25.511 ACUTE PAIN OF RIGHT SHOULDER: ICD-10-CM

## 2021-01-04 PROCEDURE — 97110 THERAPEUTIC EXERCISES: CPT | Performed by: PHYSICAL THERAPIST

## 2021-01-04 PROCEDURE — 97112 NEUROMUSCULAR REEDUCATION: CPT | Performed by: PHYSICAL THERAPIST

## 2021-01-06 ENCOUNTER — CLINICAL SUPPORT (OUTPATIENT)
Dept: REHABILITATION | Facility: HOSPITAL | Age: 71
End: 2021-01-06
Payer: MEDICARE

## 2021-01-06 DIAGNOSIS — M25.511 ACUTE PAIN OF RIGHT SHOULDER: ICD-10-CM

## 2021-01-06 PROCEDURE — 97140 MANUAL THERAPY 1/> REGIONS: CPT | Performed by: PHYSICAL THERAPIST

## 2021-01-06 PROCEDURE — 97110 THERAPEUTIC EXERCISES: CPT | Performed by: PHYSICAL THERAPIST

## 2021-01-11 ENCOUNTER — CLINICAL SUPPORT (OUTPATIENT)
Dept: REHABILITATION | Facility: HOSPITAL | Age: 71
End: 2021-01-11
Payer: MEDICARE

## 2021-01-11 DIAGNOSIS — M25.511 ACUTE PAIN OF RIGHT SHOULDER: ICD-10-CM

## 2021-01-11 PROCEDURE — 97110 THERAPEUTIC EXERCISES: CPT | Performed by: PHYSICAL THERAPIST

## 2021-01-11 PROCEDURE — 97112 NEUROMUSCULAR REEDUCATION: CPT | Performed by: PHYSICAL THERAPIST

## 2021-01-13 ENCOUNTER — CLINICAL SUPPORT (OUTPATIENT)
Dept: REHABILITATION | Facility: HOSPITAL | Age: 71
End: 2021-01-13
Payer: MEDICARE

## 2021-01-13 DIAGNOSIS — M25.511 ACUTE PAIN OF RIGHT SHOULDER: ICD-10-CM

## 2021-01-13 PROCEDURE — 97112 NEUROMUSCULAR REEDUCATION: CPT | Performed by: PHYSICAL THERAPIST

## 2021-01-13 PROCEDURE — 97110 THERAPEUTIC EXERCISES: CPT | Performed by: PHYSICAL THERAPIST

## 2021-01-20 ENCOUNTER — CLINICAL SUPPORT (OUTPATIENT)
Dept: REHABILITATION | Facility: HOSPITAL | Age: 71
End: 2021-01-20
Attending: OPHTHALMOLOGY
Payer: MEDICARE

## 2021-01-20 DIAGNOSIS — M25.511 ACUTE PAIN OF RIGHT SHOULDER: ICD-10-CM

## 2021-01-20 PROCEDURE — 97110 THERAPEUTIC EXERCISES: CPT | Performed by: PHYSICAL THERAPIST

## 2021-01-22 ENCOUNTER — CLINICAL SUPPORT (OUTPATIENT)
Dept: REHABILITATION | Facility: HOSPITAL | Age: 71
End: 2021-01-22
Payer: MEDICARE

## 2021-01-22 DIAGNOSIS — M25.511 ACUTE PAIN OF RIGHT SHOULDER: ICD-10-CM

## 2021-01-22 PROCEDURE — 97110 THERAPEUTIC EXERCISES: CPT | Performed by: PHYSICAL THERAPIST

## 2021-01-25 ENCOUNTER — CLINICAL SUPPORT (OUTPATIENT)
Dept: REHABILITATION | Facility: HOSPITAL | Age: 71
End: 2021-01-25
Payer: MEDICARE

## 2021-01-25 DIAGNOSIS — M25.511 ACUTE PAIN OF RIGHT SHOULDER: ICD-10-CM

## 2021-01-25 PROCEDURE — 97110 THERAPEUTIC EXERCISES: CPT | Performed by: PHYSICAL THERAPIST

## 2021-01-26 ENCOUNTER — OFFICE VISIT (OUTPATIENT)
Dept: SPORTS MEDICINE | Facility: CLINIC | Age: 71
End: 2021-01-26
Payer: MEDICARE

## 2021-01-26 VITALS
BODY MASS INDEX: 25.87 KG/M2 | HEIGHT: 72 IN | SYSTOLIC BLOOD PRESSURE: 169 MMHG | HEART RATE: 72 BPM | WEIGHT: 191 LBS | DIASTOLIC BLOOD PRESSURE: 91 MMHG

## 2021-01-26 DIAGNOSIS — R29.898 SHOULDER WEAKNESS: Primary | ICD-10-CM

## 2021-01-26 PROCEDURE — 99213 OFFICE O/P EST LOW 20 MIN: CPT | Mod: S$PBB,,, | Performed by: ORTHOPAEDIC SURGERY

## 2021-01-26 PROCEDURE — 99213 PR OFFICE/OUTPT VISIT, EST, LEVL III, 20-29 MIN: ICD-10-PCS | Mod: S$PBB,,, | Performed by: ORTHOPAEDIC SURGERY

## 2021-01-26 PROCEDURE — 99999 PR PBB SHADOW E&M-EST. PATIENT-LVL III: CPT | Mod: PBBFAC,,, | Performed by: ORTHOPAEDIC SURGERY

## 2021-01-26 PROCEDURE — 99213 OFFICE O/P EST LOW 20 MIN: CPT | Mod: PBBFAC | Performed by: ORTHOPAEDIC SURGERY

## 2021-01-26 PROCEDURE — 99999 PR PBB SHADOW E&M-EST. PATIENT-LVL III: ICD-10-PCS | Mod: PBBFAC,,, | Performed by: ORTHOPAEDIC SURGERY

## 2021-01-27 ENCOUNTER — CLINICAL SUPPORT (OUTPATIENT)
Dept: REHABILITATION | Facility: HOSPITAL | Age: 71
End: 2021-01-27
Payer: MEDICARE

## 2021-01-27 DIAGNOSIS — M25.511 ACUTE PAIN OF RIGHT SHOULDER: ICD-10-CM

## 2021-01-27 PROCEDURE — 97110 THERAPEUTIC EXERCISES: CPT | Performed by: PHYSICAL THERAPIST

## 2021-02-01 ENCOUNTER — CLINICAL SUPPORT (OUTPATIENT)
Dept: REHABILITATION | Facility: HOSPITAL | Age: 71
End: 2021-02-01
Payer: MEDICARE

## 2021-02-01 DIAGNOSIS — M25.511 ACUTE PAIN OF RIGHT SHOULDER: ICD-10-CM

## 2021-02-01 PROCEDURE — 97110 THERAPEUTIC EXERCISES: CPT | Performed by: PHYSICAL THERAPIST

## 2021-02-03 ENCOUNTER — CLINICAL SUPPORT (OUTPATIENT)
Dept: REHABILITATION | Facility: HOSPITAL | Age: 71
End: 2021-02-03
Payer: MEDICARE

## 2021-02-03 DIAGNOSIS — M25.511 ACUTE PAIN OF RIGHT SHOULDER: ICD-10-CM

## 2021-02-03 PROCEDURE — 97110 THERAPEUTIC EXERCISES: CPT | Performed by: PHYSICAL THERAPIST

## 2021-02-08 ENCOUNTER — CLINICAL SUPPORT (OUTPATIENT)
Dept: REHABILITATION | Facility: HOSPITAL | Age: 71
End: 2021-02-08
Payer: MEDICARE

## 2021-02-08 DIAGNOSIS — M25.511 ACUTE PAIN OF RIGHT SHOULDER: ICD-10-CM

## 2021-02-08 PROCEDURE — 97110 THERAPEUTIC EXERCISES: CPT | Performed by: PHYSICAL THERAPIST

## 2021-02-10 ENCOUNTER — CLINICAL SUPPORT (OUTPATIENT)
Dept: REHABILITATION | Facility: HOSPITAL | Age: 71
End: 2021-02-10
Payer: MEDICARE

## 2021-02-10 DIAGNOSIS — M25.511 ACUTE PAIN OF RIGHT SHOULDER: ICD-10-CM

## 2021-02-10 PROCEDURE — 97110 THERAPEUTIC EXERCISES: CPT | Performed by: PHYSICAL THERAPIST

## 2021-02-17 ENCOUNTER — CLINICAL SUPPORT (OUTPATIENT)
Dept: REHABILITATION | Facility: HOSPITAL | Age: 71
End: 2021-02-17
Payer: MEDICARE

## 2021-02-17 DIAGNOSIS — M25.511 ACUTE PAIN OF RIGHT SHOULDER: ICD-10-CM

## 2021-02-17 PROCEDURE — 97110 THERAPEUTIC EXERCISES: CPT | Performed by: PHYSICAL THERAPIST

## 2021-03-24 ENCOUNTER — TELEPHONE (OUTPATIENT)
Dept: SPORTS MEDICINE | Facility: CLINIC | Age: 71
End: 2021-03-24

## 2021-03-30 ENCOUNTER — OFFICE VISIT (OUTPATIENT)
Dept: SPORTS MEDICINE | Facility: CLINIC | Age: 71
End: 2021-03-30
Payer: MEDICARE

## 2021-03-30 VITALS
WEIGHT: 188.63 LBS | DIASTOLIC BLOOD PRESSURE: 97 MMHG | HEIGHT: 72 IN | BODY MASS INDEX: 25.55 KG/M2 | HEART RATE: 78 BPM | SYSTOLIC BLOOD PRESSURE: 168 MMHG

## 2021-03-30 DIAGNOSIS — R29.898 SHOULDER WEAKNESS: Primary | ICD-10-CM

## 2021-03-30 PROCEDURE — 99213 OFFICE O/P EST LOW 20 MIN: CPT | Mod: PBBFAC | Performed by: ORTHOPAEDIC SURGERY

## 2021-03-30 PROCEDURE — 99213 PR OFFICE/OUTPT VISIT, EST, LEVL III, 20-29 MIN: ICD-10-PCS | Mod: S$PBB,,, | Performed by: ORTHOPAEDIC SURGERY

## 2021-03-30 PROCEDURE — 99999 PR PBB SHADOW E&M-EST. PATIENT-LVL III: CPT | Mod: PBBFAC,,, | Performed by: ORTHOPAEDIC SURGERY

## 2021-03-30 PROCEDURE — 99213 OFFICE O/P EST LOW 20 MIN: CPT | Mod: S$PBB,,, | Performed by: ORTHOPAEDIC SURGERY

## 2021-03-30 PROCEDURE — 99999 PR PBB SHADOW E&M-EST. PATIENT-LVL III: ICD-10-PCS | Mod: PBBFAC,,, | Performed by: ORTHOPAEDIC SURGERY

## 2021-06-01 ENCOUNTER — HOSPITAL ENCOUNTER (OUTPATIENT)
Dept: CARDIOLOGY | Facility: OTHER | Age: 71
Discharge: HOME | End: 2021-06-01
Attending: INTERNAL MEDICINE
Payer: MEDICARE

## 2021-06-01 DIAGNOSIS — R07.2 PRECORDIAL CHEST PAIN: ICD-10-CM

## 2021-06-03 ENCOUNTER — OFFICE VISIT (OUTPATIENT)
Dept: CARDIOLOGY | Facility: CLINIC | Age: 71
End: 2021-06-03
Attending: INTERNAL MEDICINE
Payer: MEDICARE

## 2021-06-03 VITALS
SYSTOLIC BLOOD PRESSURE: 140 MMHG | HEART RATE: 66 BPM | WEIGHT: 190.38 LBS | DIASTOLIC BLOOD PRESSURE: 84 MMHG | HEIGHT: 72 IN | OXYGEN SATURATION: 98 % | BODY MASS INDEX: 25.78 KG/M2

## 2021-06-03 DIAGNOSIS — E78.00 HYPERCHOLESTEROLEMIA: ICD-10-CM

## 2021-06-03 DIAGNOSIS — E78.1 HYPERTRIGLYCERIDEMIA: ICD-10-CM

## 2021-06-03 DIAGNOSIS — E66.3 OVERWEIGHT: ICD-10-CM

## 2021-06-03 DIAGNOSIS — R07.2 PRECORDIAL PAIN: ICD-10-CM

## 2021-06-03 DIAGNOSIS — I44.7 LEFT BUNDLE BRANCH BLOCK: ICD-10-CM

## 2021-06-03 DIAGNOSIS — Z86.73 HISTORY OF CEREBROVASCULAR ACCIDENT: ICD-10-CM

## 2021-06-03 DIAGNOSIS — I10 ESSENTIAL HYPERTENSION: ICD-10-CM

## 2021-06-03 DIAGNOSIS — E11.59 TYPE 2 DIABETES MELLITUS WITH OTHER CIRCULATORY COMPLICATION, WITHOUT LONG-TERM CURRENT USE OF INSULIN: ICD-10-CM

## 2021-06-03 PROBLEM — R07.9 CHEST PAIN: Status: ACTIVE | Noted: 2021-06-03

## 2021-06-03 PROCEDURE — 93005 ELECTROCARDIOGRAM TRACING: CPT

## 2021-06-03 PROCEDURE — 99205 PR OFFICE/OUTPT VISIT, NEW, LEVL V, 60-74 MIN: ICD-10-PCS | Mod: S$PBB,25,, | Performed by: INTERNAL MEDICINE

## 2021-06-03 PROCEDURE — 93010 PR ELECTROCARDIOGRAM REPORT: ICD-10-PCS | Mod: S$PBB,,, | Performed by: INTERNAL MEDICINE

## 2021-06-03 PROCEDURE — 99999 PR PBB SHADOW E&M-EST. PATIENT-LVL III: CPT | Mod: PBBFAC,,, | Performed by: INTERNAL MEDICINE

## 2021-06-03 PROCEDURE — 99213 OFFICE O/P EST LOW 20 MIN: CPT | Mod: PBBFAC | Performed by: INTERNAL MEDICINE

## 2021-06-03 PROCEDURE — 99205 OFFICE O/P NEW HI 60 MIN: CPT | Mod: S$PBB,25,, | Performed by: INTERNAL MEDICINE

## 2021-06-03 PROCEDURE — 99999 PR PBB SHADOW E&M-EST. PATIENT-LVL III: ICD-10-PCS | Mod: PBBFAC,,, | Performed by: INTERNAL MEDICINE

## 2021-06-03 PROCEDURE — 93005 ELECTROCARDIOGRAM TRACING: CPT | Mod: PBBFAC | Performed by: INTERNAL MEDICINE

## 2021-06-03 PROCEDURE — 93010 ELECTROCARDIOGRAM REPORT: CPT | Mod: S$PBB,,, | Performed by: INTERNAL MEDICINE

## 2021-06-14 ENCOUNTER — HOSPITAL ENCOUNTER (OUTPATIENT)
Dept: RADIOLOGY | Facility: OTHER | Age: 71
Discharge: HOME | End: 2021-06-14
Attending: INTERNAL MEDICINE
Payer: MEDICARE

## 2021-06-14 ENCOUNTER — HOSPITAL ENCOUNTER (OUTPATIENT)
Dept: CARDIOLOGY | Facility: OTHER | Age: 71
Discharge: HOME | End: 2021-06-14
Attending: INTERNAL MEDICINE
Payer: MEDICARE

## 2021-06-14 VITALS
DIASTOLIC BLOOD PRESSURE: 84 MMHG | HEIGHT: 72 IN | WEIGHT: 190 LBS | SYSTOLIC BLOOD PRESSURE: 140 MMHG | BODY MASS INDEX: 25.73 KG/M2

## 2021-06-14 VITALS
HEART RATE: 61 BPM | BODY MASS INDEX: 25.73 KG/M2 | DIASTOLIC BLOOD PRESSURE: 91 MMHG | SYSTOLIC BLOOD PRESSURE: 152 MMHG | HEIGHT: 72 IN | WEIGHT: 190 LBS

## 2021-06-14 DIAGNOSIS — R07.2 PRECORDIAL PAIN: ICD-10-CM

## 2021-06-14 DIAGNOSIS — I44.7 LEFT BUNDLE BRANCH BLOCK: ICD-10-CM

## 2021-06-14 LAB
ASCENDING AORTA: 2.75 CM
AV INDEX (PROSTH): 0.73
AV MEAN GRADIENT: 3 MMHG
AV PEAK GRADIENT: 5 MMHG
AV VALVE AREA: 2.22 CM2
AV VELOCITY RATIO: 0.71
BSA FOR ECHO PROCEDURE: 2.09 M2
CV ECHO LV RWT: 0.39 CM
CV PHARM DOSE: 0.4 MG
CV STRESS BASE HR: 61 BPM
DIASTOLIC BLOOD PRESSURE: 91 MMHG
DOP CALC AO PEAK VEL: 1.07 M/S
DOP CALC AO VTI: 23.54 CM
DOP CALC LVOT AREA: 3 CM2
DOP CALC LVOT DIAMETER: 1.97 CM
DOP CALC LVOT PEAK VEL: 0.76 M/S
DOP CALC LVOT STROKE VOLUME: 52.28 CM3
DOP CALCLVOT PEAK VEL VTI: 17.16 CM
E WAVE DECELERATION TIME: 173.96 MSEC
E/A RATIO: 0.65
E/E' RATIO: 7.87 M/S
ECHO LV POSTERIOR WALL: 0.92 CM (ref 0.6–1.1)
EJECTION FRACTION: 55 %
END DIASTOLIC INDEX-HIGH: 170 ML/M2
END SYSTOLIC INDEX-HIGH: 70 ML/M2
FRACTIONAL SHORTENING: 36 % (ref 28–44)
INTERVENTRICULAR SEPTUM: 0.84 CM (ref 0.6–1.1)
IVRT: 125.69 MSEC
LA MAJOR: 3.35 CM
LA MINOR: 4.59 CM
LA WIDTH: 3.84 CM
LEFT ATRIUM SIZE: 3.21 CM
LEFT ATRIUM VOLUME INDEX: 19.5 ML/M2
LEFT ATRIUM VOLUME: 40.58 CM3
LEFT INTERNAL DIMENSION IN SYSTOLE: 3.01 CM (ref 2.1–4)
LEFT VENTRICLE DIASTOLIC VOLUME INDEX: 49.13 ML/M2
LEFT VENTRICLE DIASTOLIC VOLUME: 102.2 ML
LEFT VENTRICLE MASS INDEX: 67 G/M2
LEFT VENTRICLE SYSTOLIC VOLUME INDEX: 17 ML/M2
LEFT VENTRICLE SYSTOLIC VOLUME: 35.32 ML
LEFT VENTRICULAR INTERNAL DIMENSION IN DIASTOLE: 4.7 CM (ref 3.5–6)
LEFT VENTRICULAR MASS: 138.51 G
LV LATERAL E/E' RATIO: 7.38 M/S
LV SEPTAL E/E' RATIO: 8.43 M/S
MV A" WAVE DURATION": 8.13 MSEC
MV PEAK A VEL: 0.91 M/S
MV PEAK E VEL: 0.59 M/S
MV STENOSIS PRESSURE HALF TIME: 50.45 MS
MV VALVE AREA P 1/2 METHOD: 4.36 CM2
NUC REST DIASTOLIC VOLUME INDEX: 98
NUC REST EJECTION FRACTION: 71
NUC REST SYSTOLIC VOLUME INDEX: 28
NUC STRESS DIASTOLIC VOLUME INDEX: 115
NUC STRESS EJECTION FRACTION: 63 %
NUC STRESS SYSTOLIC VOLUME INDEX: 43
OHS CV CPX 85 PERCENT MAX PREDICTED HEART RATE MALE: 129
OHS CV CPX MAX PREDICTED HEART RATE: 152
OHS CV CPX PATIENT IS FEMALE: 0
OHS CV CPX PATIENT IS MALE: 1
OHS CV CPX PEAK DIASTOLIC BLOOD PRESSURE: 75 MMHG
OHS CV CPX PEAK HEAR RATE: 113 BPM
OHS CV CPX PEAK RATE PRESSURE PRODUCT: NORMAL
OHS CV CPX PEAK SYSTOLIC BLOOD PRESSURE: 134 MMHG
OHS CV CPX PERCENT MAX PREDICTED HEART RATE ACHIEVED: 74
OHS CV CPX RATE PRESSURE PRODUCT PRESENTING: 9272
PISA TR MAX VEL: 2.03 M/S
PULM VEIN S/D RATIO: 1.63
PV PEAK D VEL: 0.32 M/S
PV PEAK S VEL: 0.52 M/S
PV PEAK VELOCITY: 1.28 CM/S
RA MAJOR: 3.25 CM
RA PRESSURE: 3 MMHG
RA WIDTH: 3.31 CM
RETIRED EF AND QEF - SEE NOTES: 51 %
RIGHT VENTRICULAR END-DIASTOLIC DIMENSION: 3.22 CM
SINUS: 2.89 CM
STJ: 2.51 CM
SYSTOLIC BLOOD PRESSURE: 152 MMHG
TDI LATERAL: 0.08 M/S
TDI SEPTAL: 0.07 M/S
TDI: 0.08 M/S
TR MAX PG: 16 MMHG
TRICUSPID ANNULAR PLANE SYSTOLIC EXCURSION: 1.53 CM
TV REST PULMONARY ARTERY PRESSURE: 19 MMHG

## 2021-06-14 PROCEDURE — 78452 HT MUSCLE IMAGE SPECT MULT: CPT

## 2021-06-14 PROCEDURE — 93306 ECHO (CUPID ONLY): ICD-10-PCS | Mod: 26,,, | Performed by: INTERNAL MEDICINE

## 2021-06-14 PROCEDURE — 93018 CV STRESS TEST I&R ONLY: CPT | Mod: ,,, | Performed by: INTERNAL MEDICINE

## 2021-06-14 PROCEDURE — A9500 TC99M SESTAMIBI: HCPCS

## 2021-06-14 PROCEDURE — 78452 STRESS TEST WITH MYOCARDIAL PERFUSION (CUPID ONLY): ICD-10-PCS | Mod: 26,,, | Performed by: INTERNAL MEDICINE

## 2021-06-14 PROCEDURE — 93306 TTE W/DOPPLER COMPLETE: CPT

## 2021-06-14 PROCEDURE — 63600175 PHARM REV CODE 636 W HCPCS: Performed by: INTERNAL MEDICINE

## 2021-06-14 PROCEDURE — 93018 STRESS TEST WITH MYOCARDIAL PERFUSION (CUPID ONLY): ICD-10-PCS | Mod: ,,, | Performed by: INTERNAL MEDICINE

## 2021-06-14 PROCEDURE — 93016 STRESS TEST WITH MYOCARDIAL PERFUSION (CUPID ONLY): ICD-10-PCS | Mod: ,,, | Performed by: INTERNAL MEDICINE

## 2021-06-14 PROCEDURE — 93016 CV STRESS TEST SUPVJ ONLY: CPT | Mod: ,,, | Performed by: INTERNAL MEDICINE

## 2021-06-14 PROCEDURE — 78452 HT MUSCLE IMAGE SPECT MULT: CPT | Mod: 26,,, | Performed by: INTERNAL MEDICINE

## 2021-06-14 PROCEDURE — 93306 TTE W/DOPPLER COMPLETE: CPT | Mod: 26,,, | Performed by: INTERNAL MEDICINE

## 2021-06-14 RX ORDER — REGADENOSON 0.08 MG/ML
0.4 INJECTION, SOLUTION INTRAVENOUS ONCE
Status: COMPLETED | OUTPATIENT
Start: 2021-06-14 | End: 2021-06-14

## 2021-06-14 RX ADMIN — REGADENOSON 0.4 MG: 0.08 INJECTION, SOLUTION INTRAVENOUS at 11:06

## 2021-07-12 ENCOUNTER — OFFICE VISIT (OUTPATIENT)
Dept: CARDIOLOGY | Facility: CLINIC | Age: 71
End: 2021-07-12
Attending: INTERNAL MEDICINE
Payer: MEDICARE

## 2021-07-12 VITALS
HEIGHT: 72 IN | HEART RATE: 65 BPM | SYSTOLIC BLOOD PRESSURE: 165 MMHG | WEIGHT: 187.63 LBS | DIASTOLIC BLOOD PRESSURE: 89 MMHG | BODY MASS INDEX: 25.41 KG/M2

## 2021-07-12 DIAGNOSIS — E11.59 TYPE 2 DIABETES MELLITUS WITH OTHER CIRCULATORY COMPLICATION, WITHOUT LONG-TERM CURRENT USE OF INSULIN: ICD-10-CM

## 2021-07-12 DIAGNOSIS — I10 ESSENTIAL HYPERTENSION: ICD-10-CM

## 2021-07-12 DIAGNOSIS — E66.3 OVERWEIGHT: ICD-10-CM

## 2021-07-12 DIAGNOSIS — I44.7 LEFT BUNDLE BRANCH BLOCK: ICD-10-CM

## 2021-07-12 DIAGNOSIS — Z87.898 HISTORY OF CHEST PAIN: ICD-10-CM

## 2021-07-12 DIAGNOSIS — Z86.73 HISTORY OF CEREBROVASCULAR ACCIDENT: ICD-10-CM

## 2021-07-12 DIAGNOSIS — E78.00 HYPERCHOLESTEROLEMIA: ICD-10-CM

## 2021-07-12 DIAGNOSIS — E78.1 HYPERTRIGLYCERIDEMIA: ICD-10-CM

## 2021-07-12 PROCEDURE — 99214 PR OFFICE/OUTPT VISIT, EST, LEVL IV, 30-39 MIN: ICD-10-PCS | Mod: S$PBB,,, | Performed by: INTERNAL MEDICINE

## 2021-07-12 PROCEDURE — 99999 PR PBB SHADOW E&M-EST. PATIENT-LVL III: ICD-10-PCS | Mod: PBBFAC,,, | Performed by: INTERNAL MEDICINE

## 2021-07-12 PROCEDURE — 99214 OFFICE O/P EST MOD 30 MIN: CPT | Mod: S$PBB,,, | Performed by: INTERNAL MEDICINE

## 2021-07-12 PROCEDURE — 99999 PR PBB SHADOW E&M-EST. PATIENT-LVL III: CPT | Mod: PBBFAC,,, | Performed by: INTERNAL MEDICINE

## 2021-07-12 PROCEDURE — 99213 OFFICE O/P EST LOW 20 MIN: CPT | Mod: PBBFAC | Performed by: INTERNAL MEDICINE

## 2021-07-12 RX ORDER — AMLODIPINE BESYLATE 10 MG/1
10 TABLET ORAL DAILY
Qty: 90 TABLET | Refills: 3 | Status: SHIPPED | OUTPATIENT
Start: 2021-07-12 | End: 2021-09-16 | Stop reason: SDUPTHER

## 2021-09-16 ENCOUNTER — OFFICE VISIT (OUTPATIENT)
Dept: CARDIOLOGY | Facility: CLINIC | Age: 71
End: 2021-09-16
Attending: INTERNAL MEDICINE
Payer: MEDICARE

## 2021-09-16 VITALS
BODY MASS INDEX: 25.4 KG/M2 | WEIGHT: 187.5 LBS | HEIGHT: 72 IN | SYSTOLIC BLOOD PRESSURE: 136 MMHG | HEART RATE: 60 BPM | OXYGEN SATURATION: 98 % | DIASTOLIC BLOOD PRESSURE: 80 MMHG

## 2021-09-16 DIAGNOSIS — E78.00 HYPERCHOLESTEROLEMIA: ICD-10-CM

## 2021-09-16 DIAGNOSIS — S46.011A TRAUMATIC COMPLETE TEAR OF RIGHT ROTATOR CUFF, INITIAL ENCOUNTER: ICD-10-CM

## 2021-09-16 DIAGNOSIS — I10 ESSENTIAL HYPERTENSION: ICD-10-CM

## 2021-09-16 DIAGNOSIS — E66.3 OVERWEIGHT: ICD-10-CM

## 2021-09-16 DIAGNOSIS — E78.1 HYPERTRIGLYCERIDEMIA: ICD-10-CM

## 2021-09-16 DIAGNOSIS — Z86.73 HISTORY OF CEREBROVASCULAR ACCIDENT: ICD-10-CM

## 2021-09-16 DIAGNOSIS — E11.59 TYPE 2 DIABETES MELLITUS WITH OTHER CIRCULATORY COMPLICATION, WITHOUT LONG-TERM CURRENT USE OF INSULIN: ICD-10-CM

## 2021-09-16 DIAGNOSIS — Z87.898 HISTORY OF CHEST PAIN: ICD-10-CM

## 2021-09-16 DIAGNOSIS — I44.7 LEFT BUNDLE BRANCH BLOCK: ICD-10-CM

## 2021-09-16 PROCEDURE — 99213 OFFICE O/P EST LOW 20 MIN: CPT | Mod: PBBFAC | Performed by: INTERNAL MEDICINE

## 2021-09-16 PROCEDURE — 99999 PR PBB SHADOW E&M-EST. PATIENT-LVL III: ICD-10-PCS | Mod: PBBFAC,,, | Performed by: INTERNAL MEDICINE

## 2021-09-16 PROCEDURE — 99214 OFFICE O/P EST MOD 30 MIN: CPT | Mod: S$PBB,,, | Performed by: INTERNAL MEDICINE

## 2021-09-16 PROCEDURE — 99214 PR OFFICE/OUTPT VISIT, EST, LEVL IV, 30-39 MIN: ICD-10-PCS | Mod: S$PBB,,, | Performed by: INTERNAL MEDICINE

## 2021-09-16 PROCEDURE — 99999 PR PBB SHADOW E&M-EST. PATIENT-LVL III: CPT | Mod: PBBFAC,,, | Performed by: INTERNAL MEDICINE

## 2021-09-16 RX ORDER — AMLODIPINE BESYLATE 10 MG/1
10 TABLET ORAL DAILY
Qty: 90 TABLET | Refills: 3 | Status: SHIPPED | OUTPATIENT
Start: 2021-09-16 | End: 2022-07-14 | Stop reason: SDUPTHER

## 2021-09-16 RX ORDER — BENAZEPRIL HYDROCHLORIDE 20 MG/1
20 TABLET ORAL DAILY
Qty: 90 TABLET | Refills: 3 | Status: SHIPPED | OUTPATIENT
Start: 2021-09-16 | End: 2022-07-14 | Stop reason: SDUPTHER

## 2021-09-16 RX ORDER — ASPIRIN 81 MG/1
81 TABLET ORAL DAILY
Qty: 90 TABLET | Refills: 3 | Status: SHIPPED | OUTPATIENT
Start: 2021-09-16 | End: 2022-07-14 | Stop reason: SDUPTHER

## 2021-09-16 RX ORDER — ATORVASTATIN CALCIUM 40 MG/1
40 TABLET, FILM COATED ORAL DAILY
Qty: 90 TABLET | Refills: 3 | Status: SHIPPED | OUTPATIENT
Start: 2021-09-16 | End: 2022-07-14 | Stop reason: SDUPTHER

## 2022-01-20 ENCOUNTER — OFFICE VISIT (OUTPATIENT)
Dept: CARDIOLOGY | Facility: CLINIC | Age: 72
End: 2022-01-20
Attending: INTERNAL MEDICINE
Payer: MEDICARE

## 2022-01-20 VITALS
BODY MASS INDEX: 25.84 KG/M2 | HEIGHT: 72 IN | HEART RATE: 80 BPM | SYSTOLIC BLOOD PRESSURE: 125 MMHG | WEIGHT: 190.81 LBS | DIASTOLIC BLOOD PRESSURE: 75 MMHG

## 2022-01-20 DIAGNOSIS — I44.7 LEFT BUNDLE BRANCH BLOCK: ICD-10-CM

## 2022-01-20 DIAGNOSIS — Z86.73 HISTORY OF CEREBROVASCULAR ACCIDENT: ICD-10-CM

## 2022-01-20 DIAGNOSIS — E78.1 HYPERTRIGLYCERIDEMIA: ICD-10-CM

## 2022-01-20 DIAGNOSIS — I10 PRIMARY HYPERTENSION: ICD-10-CM

## 2022-01-20 DIAGNOSIS — E66.3 OVERWEIGHT: ICD-10-CM

## 2022-01-20 DIAGNOSIS — Z87.898 HISTORY OF CHEST PAIN: ICD-10-CM

## 2022-01-20 DIAGNOSIS — E78.00 HYPERCHOLESTEROLEMIA: ICD-10-CM

## 2022-01-20 DIAGNOSIS — E11.59 TYPE 2 DIABETES MELLITUS WITH OTHER CIRCULATORY COMPLICATION, WITHOUT LONG-TERM CURRENT USE OF INSULIN: ICD-10-CM

## 2022-01-20 PROCEDURE — 99999 PR PBB SHADOW E&M-EST. PATIENT-LVL III: ICD-10-PCS | Mod: PBBFAC,,, | Performed by: INTERNAL MEDICINE

## 2022-01-20 PROCEDURE — 99214 PR OFFICE/OUTPT VISIT, EST, LEVL IV, 30-39 MIN: ICD-10-PCS | Mod: S$PBB,,, | Performed by: INTERNAL MEDICINE

## 2022-01-20 PROCEDURE — 99999 PR PBB SHADOW E&M-EST. PATIENT-LVL III: CPT | Mod: PBBFAC,,, | Performed by: INTERNAL MEDICINE

## 2022-01-20 PROCEDURE — 99214 OFFICE O/P EST MOD 30 MIN: CPT | Mod: S$PBB,,, | Performed by: INTERNAL MEDICINE

## 2022-01-20 PROCEDURE — 99213 OFFICE O/P EST LOW 20 MIN: CPT | Mod: PBBFAC | Performed by: INTERNAL MEDICINE

## 2022-01-20 NOTE — PROGRESS NOTES
Subjective:     Calderon Burt Jr. is a 71 y.o. male with hypertension, hypercholesterolemia, hypertriglyceridemia and diabetes mellitus type 2. He is overweight. In 12/2019 he suffered a thalamic cerebrovascular accident when he presented with weakness in the left arm. In 4/2021 he began to experience a left sided chest discomfort. The pain came on when he was moving furniture at home. He has not had any chest pain since. On 6/14/2021 he had an Echo that revealed normal left ventricular size with low normal systolic function with an ejection fraction of 55%. The septum contracted as with left bundle branch block. There was mild diastolic dysfunction. On 6/14/2021 he had a Regadenoson MPI that revealed normal perfusion. The ejection fraction was 63%. Accordingly there was nothing to suggest obstructive coronary artery disease an it appeared the chest pain most certainly was non-cardiac in origin. He has been noted to have left bundle branch block. No exertional shortness of breath. No palpitations or weak spells. No issues with any of his prescribed medications. Feeling well.          Chest Pain   This is a chronic problem. The current episode started more than 1 year ago. The problem has been resolved. Pertinent negatives include no abdominal pain, back pain, claudication, cough, diaphoresis, dizziness, exertional chest pressure, fever, headaches, hemoptysis, irregular heartbeat, leg pain, lower extremity edema, malaise/fatigue, nausea, near-syncope, numbness, orthopnea, palpitations, PND, shortness of breath, sputum production, syncope, vomiting or weakness.   His past medical history is significant for diabetes and hyperlipidemia.   Pertinent negatives for past medical history include no muscle weakness.   Cerebrovascular Accident  This is a chronic problem. Pertinent negatives include no abdominal pain, anorexia, arthralgias, change in bowel habit, chest pain, chills, congestion, coughing, diaphoresis, fatigue,  fever, headaches, joint swelling, myalgias, nausea, neck pain, numbness, rash, sore throat, swollen glands, urinary symptoms, vertigo, visual change, vomiting or weakness.   Hypertension  This is a chronic problem. The current episode started more than 1 year ago. The problem is controlled (usually 125-130/75-80 mmHg at home). Pertinent negatives include no anxiety, blurred vision, chest pain, headaches, malaise/fatigue, neck pain, orthopnea, palpitations, peripheral edema, PND, shortness of breath or sweats. There is no history of chronic renal disease.   Hyperlipidemia  This is a chronic problem. The current episode started more than 1 year ago. Recent lipid tests were reviewed and are normal. Exacerbating diseases include diabetes. He has no history of chronic renal disease, hypothyroidism, liver disease, obesity or nephrotic syndrome. Pertinent negatives include no chest pain, focal sensory loss, focal weakness, leg pain, myalgias or shortness of breath.       Review of Systems   Constitutional: Negative for chills, diaphoresis, fatigue, fever and malaise/fatigue.   HENT: Negative for congestion, nosebleeds and sore throat.    Eyes: Negative for blurred vision, double vision, vision loss in left eye and vision loss in right eye.   Cardiovascular: Negative for chest pain, claudication, dyspnea on exertion, irregular heartbeat, leg swelling, near-syncope, orthopnea, palpitations, paroxysmal nocturnal dyspnea and syncope.   Respiratory: Negative for cough, hemoptysis, shortness of breath, sputum production and wheezing.    Endocrine: Negative for cold intolerance and heat intolerance.   Hematologic/Lymphatic: Negative for bleeding problem. Does not bruise/bleed easily.   Skin: Negative for color change and rash.   Musculoskeletal: Negative for arthralgias, back pain, falls, joint swelling, muscle weakness, myalgias and neck pain.   Gastrointestinal: Negative for abdominal pain, anorexia, change in bowel habit,  heartburn, hematemesis, hematochezia, hemorrhoids, jaundice, melena, nausea and vomiting.   Genitourinary: Negative for dysuria and hematuria.   Neurological: Negative for dizziness, focal weakness, headaches, light-headedness, loss of balance, numbness, tremors, vertigo and weakness.   Psychiatric/Behavioral: Negative for altered mental status, depression and memory loss. The patient is not nervous/anxious.    Allergic/Immunologic: Negative for hives and persistent infections.       Current Outpatient Medications on File Prior to Visit   Medication Sig Dispense Refill    amLODIPine (NORVASC) 10 MG tablet Take 1 tablet (10 mg total) by mouth once daily. 90 tablet 3    aspirin (ECOTRIN) 81 MG EC tablet Take 1 tablet (81 mg total) by mouth once daily. 90 tablet 3    atorvastatin (LIPITOR) 40 MG tablet Take 1 tablet (40 mg total) by mouth once daily. 90 tablet 3    benazepriL (LOTENSIN) 20 MG tablet Take 1 tablet (20 mg total) by mouth once daily. 90 tablet 3    metFORMIN (GLUMETZA) 1000 MG (MOD) 24 hr tablet Take 1,000 mg by mouth 2 (two) times daily with meals.      bimatoprost (LUMIGAN) 0.01 % Drop 1 drop every evening.      brimonidine-timolol (COMBIGAN) 0.2-0.5 % Drop Place 1 drop into both eyes.      naproxen (NAPROSYN) 500 MG tablet Take 1 tablet (500 mg total) by mouth 2 (two) times daily. (Patient not taking: Reported on 9/16/2021) 30 tablet 2    naproxen (NAPROSYN) 500 MG tablet Take 1 tablet (500 mg total) by mouth 2 (two) times daily. (Patient not taking: Reported on 6/3/2021) 30 tablet 1    psyllium (METAMUCIL) powder Take 1 packet by mouth once daily. Pt takes about twice a week      tamsulosin (FLOMAX) 0.4 mg Cap Take 0.4 mg by mouth once daily.      temazepam (RESTORIL) 7.5 MG Cap Take 15 mg by mouth nightly as needed.       Current Facility-Administered Medications on File Prior to Visit   Medication Dose Route Frequency Provider Last Rate Last Admin    fentaNYL injection 25 mcg  25 mcg  Intravenous Q5 Min PRN Marissa Luciano MD   50 mcg at 08/28/20 0850    midazolam (VERSED) 1 mg/mL injection 0.5 mg  0.5 mg Intravenous PRN Marissa Luciano MD   1 mg at 08/28/20 0850    ropivacaine 0.2% ON-Q C-BLOC 400 ML (SELECT A FLOW)   Perineural Continuous Marissa Luciano MD 6 mL/hr at 08/28/20 1258 New Bag at 08/28/20 1258       /75 Comment: average at home  Pulse 80   Ht 6' (1.829 m)   Wt 86.5 kg (190 lb 12.9 oz)   BMI 25.88 kg/m²       Objective:     Physical Exam  Constitutional:       General: He is not in acute distress.     Appearance: Normal appearance. He is well-developed. He is not toxic-appearing or diaphoretic.   HENT:      Head: Normocephalic and atraumatic.      Nose: Nose normal.   Eyes:      General:         Right eye: No discharge.         Left eye: No discharge.      Conjunctiva/sclera:      Right eye: Right conjunctiva is not injected.      Left eye: Left conjunctiva is not injected.      Pupils: Pupils are equal.      Right eye: Pupil is round.      Left eye: Pupil is round.   Neck:      Thyroid: No thyromegaly.      Vascular: No carotid bruit or JVD.   Cardiovascular:      Rate and Rhythm: Normal rate and regular rhythm.  No extrasystoles are present.     Chest Wall: PMI is not displaced.      Pulses:           Radial pulses are 2+ on the right side and 2+ on the left side.        Femoral pulses are 2+ on the right side and 2+ on the left side.       Dorsalis pedis pulses are 2+ on the right side and 2+ on the left side.        Posterior tibial pulses are 2+ on the right side and 2+ on the left side.      Heart sounds: S1 normal and S2 normal. No murmur heard.  Gallop present. S4 sounds present.    Pulmonary:      Effort: Pulmonary effort is normal.      Breath sounds: Normal breath sounds.   Chest:      Chest wall: No swelling or tenderness.   Abdominal:      Palpations: Abdomen is soft.      Tenderness: There is no abdominal tenderness.   Musculoskeletal:      Cervical  back: Neck supple.      Right ankle: No swelling, deformity or ecchymosis.      Left ankle: No swelling, deformity or ecchymosis.   Lymphadenopathy:      Head:      Right side of head: No submandibular adenopathy.      Left side of head: No submandibular adenopathy.      Cervical: No cervical adenopathy.   Skin:     General: Skin is warm and dry.      Findings: No rash.      Nails: There is no clubbing.   Neurological:      General: No focal deficit present.      Mental Status: He is alert and oriented to person, place, and time. He is not disoriented.      Cranial Nerves: No cranial nerve deficit.   Psychiatric:         Attention and Perception: Attention and perception normal.         Mood and Affect: Mood and affect normal.         Speech: Speech normal.         Behavior: Behavior normal.         Thought Content: Thought content normal.         Cognition and Memory: Cognition and memory normal.         Judgment: Judgment normal.       Assessment:     1. History of chest pain    2. Left bundle branch block    3. History of cerebrovascular accident    4. Primary hypertension    5. Hypercholesterolemia    6. Hypertriglyceridemia    7. Type 2 diabetes mellitus with other circulatory complication, without long-term current use of insulin    8. Overweight        Plan:     1. History of Chest Pain   4/2021: Began experience chest pain.   12/17/2019: Echo: Normal left ventricular size and systolic function.   6/14/2021: Echo: Normal left ventricular size with low normal systolic function. EF 55%. LBBB. Mild diastolic dysfunction.    6/14/2021: Regadenoson MPI: Normal perfusion. EF 63%.   Nothing to suggest obstructive CAD. Chest pain was most certainly non-cardiac in origin.   Chest pain appeared atypical.   Mostly resolved.    2. Left Bundle Branch Block   2021: Diagnosed.     3. History of Cerebrovascular Accident   12/2019: Thalamic CVA. Left arm weakness.   All risk factors are addressed.   On aspirin 81 mg  Q24.     4. Hypertension   2016: Diagnosed.    7/12/2021: Amlodipine 10 mg Q24 was begun as running high.    On amlodipine 10 mg Q24 and benazepril 20 mg Q24.   Keeping log at home.   Well controlled.    5. Hypercholesterolemia   2016: Began statin.    On atorvastatin 40 mg Q24.   12/16/2019: Chol 153. HDL 46. LDL 62. .   On atorvastatin 40 mg Q24.   Favorable lipid panel.    6. Hypertriglyceridemia   2016: Diagnosed.   As above.    7. Diabetes Mellitus, Type 2   2018: Diagnosed. Complications: CVA. Medications: Oral agent.    On metformin 500 mg Q12.   Tells me well controlled.    8. Overweight   6/3/2021: Weight 86 kg. BMI 26.     9. Primary Care   Dr. Jigar uDrant.    F/u 6 months.    Kevin Ham M.D.

## 2022-07-03 NOTE — ASSESSMENT & PLAN NOTE
- elevated triglycerides and decreased LDL  - start high intensity statin for secondary stroke prevention, atorvastatin 40 mg daily  - dietician consult placed        Pt was given urinal. Now ambulating to bathroom w/ steady gait.

## 2022-07-14 ENCOUNTER — OFFICE VISIT (OUTPATIENT)
Dept: CARDIOLOGY | Facility: CLINIC | Age: 72
End: 2022-07-14
Attending: INTERNAL MEDICINE
Payer: MEDICARE

## 2022-07-14 VITALS
OXYGEN SATURATION: 98 % | BODY MASS INDEX: 25.68 KG/M2 | SYSTOLIC BLOOD PRESSURE: 124 MMHG | WEIGHT: 189.63 LBS | HEIGHT: 72 IN | DIASTOLIC BLOOD PRESSURE: 70 MMHG | HEART RATE: 70 BPM

## 2022-07-14 DIAGNOSIS — E78.00 HYPERCHOLESTEROLEMIA: ICD-10-CM

## 2022-07-14 DIAGNOSIS — I44.7 LEFT BUNDLE BRANCH BLOCK: ICD-10-CM

## 2022-07-14 DIAGNOSIS — Z87.898 HISTORY OF CHEST PAIN: ICD-10-CM

## 2022-07-14 DIAGNOSIS — I10 ESSENTIAL HYPERTENSION: ICD-10-CM

## 2022-07-14 DIAGNOSIS — E11.59 TYPE 2 DIABETES MELLITUS WITH OTHER CIRCULATORY COMPLICATION, WITHOUT LONG-TERM CURRENT USE OF INSULIN: ICD-10-CM

## 2022-07-14 DIAGNOSIS — I10 PRIMARY HYPERTENSION: ICD-10-CM

## 2022-07-14 DIAGNOSIS — E78.1 HYPERTRIGLYCERIDEMIA: ICD-10-CM

## 2022-07-14 DIAGNOSIS — E66.3 OVERWEIGHT: ICD-10-CM

## 2022-07-14 DIAGNOSIS — Z86.73 HISTORY OF CEREBROVASCULAR ACCIDENT: ICD-10-CM

## 2022-07-14 PROCEDURE — 93010 ELECTROCARDIOGRAM REPORT: CPT | Mod: S$PBB,,, | Performed by: INTERNAL MEDICINE

## 2022-07-14 PROCEDURE — 99213 OFFICE O/P EST LOW 20 MIN: CPT | Mod: PBBFAC | Performed by: INTERNAL MEDICINE

## 2022-07-14 PROCEDURE — 99214 OFFICE O/P EST MOD 30 MIN: CPT | Mod: S$PBB,25,, | Performed by: INTERNAL MEDICINE

## 2022-07-14 PROCEDURE — 93010 EKG 12-LEAD: ICD-10-PCS | Mod: ,,, | Performed by: INTERNAL MEDICINE

## 2022-07-14 PROCEDURE — 93005 ELECTROCARDIOGRAM TRACING: CPT

## 2022-07-14 PROCEDURE — 99999 PR PBB SHADOW E&M-EST. PATIENT-LVL III: ICD-10-PCS | Mod: PBBFAC,,, | Performed by: INTERNAL MEDICINE

## 2022-07-14 PROCEDURE — 99214 PR OFFICE/OUTPT VISIT, EST, LEVL IV, 30-39 MIN: ICD-10-PCS | Mod: S$PBB,25,, | Performed by: INTERNAL MEDICINE

## 2022-07-14 PROCEDURE — 99999 PR PBB SHADOW E&M-EST. PATIENT-LVL III: CPT | Mod: PBBFAC,,, | Performed by: INTERNAL MEDICINE

## 2022-07-14 PROCEDURE — 93005 ELECTROCARDIOGRAM TRACING: CPT | Mod: PBBFAC | Performed by: INTERNAL MEDICINE

## 2022-07-14 PROCEDURE — 93010 PR ELECTROCARDIOGRAM REPORT: ICD-10-PCS | Mod: S$PBB,,, | Performed by: INTERNAL MEDICINE

## 2022-07-14 PROCEDURE — 93010 ELECTROCARDIOGRAM REPORT: CPT | Mod: ,,, | Performed by: INTERNAL MEDICINE

## 2022-07-14 RX ORDER — ATORVASTATIN CALCIUM 40 MG/1
40 TABLET, FILM COATED ORAL DAILY
Qty: 90 TABLET | Refills: 3 | Status: SHIPPED | OUTPATIENT
Start: 2022-07-14 | End: 2023-01-13 | Stop reason: SDUPTHER

## 2022-07-14 RX ORDER — AMLODIPINE BESYLATE 10 MG/1
10 TABLET ORAL DAILY
Qty: 90 TABLET | Refills: 3 | Status: SHIPPED | OUTPATIENT
Start: 2022-07-14 | End: 2023-01-13 | Stop reason: SDUPTHER

## 2022-07-14 RX ORDER — BENAZEPRIL HYDROCHLORIDE 20 MG/1
20 TABLET ORAL DAILY
Qty: 90 TABLET | Refills: 3 | Status: SHIPPED | OUTPATIENT
Start: 2022-07-14 | End: 2023-01-13 | Stop reason: SDUPTHER

## 2022-07-14 RX ORDER — ASPIRIN 81 MG/1
81 TABLET ORAL DAILY
Qty: 90 TABLET | Refills: 3 | Status: SHIPPED | OUTPATIENT
Start: 2022-07-14 | End: 2023-01-13 | Stop reason: SDUPTHER

## 2022-07-14 NOTE — PROGRESS NOTES
Subjective:     Calderon Burt Jr. is a 71 y.o. male with hypertension, hypercholesterolemia, hypertriglyceridemia and diabetes mellitus type 2. He is overweight. In 12/2019 he suffered a thalamic cerebrovascular accident when he presented with weakness in the left arm. In 4/2021 he began to experience a left sided chest discomfort. The pain came on when he was moving furniture at home. He has not had any chest pain since. On 6/14/2021 he had an echocardiogram that revealed normal left ventricular size with low normal systolic function with an ejection fraction of 55%. The septum contracted as with left bundle branch block. There was mild diastolic dysfunction. On 6/14/2021 he had a regadenoson MPI that revealed normal perfusion. The ejection fraction was 63%. Accordingly there was nothing to suggest obstructive coronary artery disease and it appeared the chest pain most certainly was non-cardiac in origin. He has been noted to have left bundle branch block. No exertional shortness of breath. No palpitations or weak spells. No issues with any of his prescribed medications. Feeling well.          Chest Pain   This is a chronic problem. The current episode started more than 1 year ago. The problem has been resolved. Pertinent negatives include no abdominal pain, back pain, claudication, cough, diaphoresis, dizziness, exertional chest pressure, fever, headaches, hemoptysis, irregular heartbeat, leg pain, lower extremity edema, malaise/fatigue, nausea, near-syncope, numbness, orthopnea, palpitations, PND, shortness of breath, sputum production, syncope, vomiting or weakness.   His past medical history is significant for diabetes and hyperlipidemia.   Pertinent negatives for past medical history include no muscle weakness.   Cerebrovascular Accident  This is a chronic problem. Pertinent negatives include no abdominal pain, anorexia, arthralgias, change in bowel habit, chest pain, chills, congestion, coughing,  diaphoresis, fatigue, fever, headaches, joint swelling, myalgias, nausea, neck pain, numbness, rash, sore throat, swollen glands, urinary symptoms, vertigo, visual change, vomiting or weakness.   Hypertension  This is a chronic problem. The current episode started more than 1 year ago. The problem is controlled (usually 125-130/75-80 mmHg at home). Pertinent negatives include no anxiety, blurred vision, chest pain, headaches, malaise/fatigue, neck pain, orthopnea, palpitations, peripheral edema, PND, shortness of breath or sweats. There is no history of chronic renal disease.   Hyperlipidemia  This is a chronic problem. The current episode started more than 1 year ago. Recent lipid tests were reviewed and are normal. Exacerbating diseases include diabetes. He has no history of chronic renal disease, hypothyroidism, liver disease, obesity or nephrotic syndrome. Pertinent negatives include no chest pain, focal sensory loss, focal weakness, leg pain, myalgias or shortness of breath.       Review of Systems   Constitutional: Negative for chills, diaphoresis, fatigue, fever and malaise/fatigue.   HENT: Negative for congestion, nosebleeds and sore throat.    Eyes: Negative for blurred vision, double vision, vision loss in left eye and vision loss in right eye.   Cardiovascular: Negative for chest pain, claudication, dyspnea on exertion, irregular heartbeat, leg swelling, near-syncope, orthopnea, palpitations, paroxysmal nocturnal dyspnea and syncope.   Respiratory: Negative for cough, hemoptysis, shortness of breath, sputum production and wheezing.    Endocrine: Negative for cold intolerance and heat intolerance.   Hematologic/Lymphatic: Negative for bleeding problem. Does not bruise/bleed easily.   Skin: Negative for color change and rash.   Musculoskeletal: Negative for arthralgias, back pain, falls, joint swelling, muscle weakness, myalgias and neck pain.   Gastrointestinal: Negative for abdominal pain, anorexia,  change in bowel habit, heartburn, hematemesis, hematochezia, hemorrhoids, jaundice, melena, nausea and vomiting.   Genitourinary: Negative for dysuria and hematuria.   Neurological: Negative for dizziness, focal weakness, headaches, light-headedness, loss of balance, numbness, tremors, vertigo and weakness.   Psychiatric/Behavioral: Negative for altered mental status, depression and memory loss. The patient is not nervous/anxious.    Allergic/Immunologic: Negative for hives and persistent infections.       Current Outpatient Medications on File Prior to Visit   Medication Sig Dispense Refill    amLODIPine (NORVASC) 10 MG tablet Take 1 tablet (10 mg total) by mouth once daily. 90 tablet 3    aspirin (ECOTRIN) 81 MG EC tablet Take 1 tablet (81 mg total) by mouth once daily. 90 tablet 3    atorvastatin (LIPITOR) 40 MG tablet Take 1 tablet (40 mg total) by mouth once daily. 90 tablet 3    benazepriL (LOTENSIN) 20 MG tablet Take 1 tablet (20 mg total) by mouth once daily. 90 tablet 3    metFORMIN (GLUMETZA) 1000 MG (MOD) 24 hr tablet Take 1,000 mg by mouth 2 (two) times daily with meals.      bimatoprost (LUMIGAN) 0.01 % Drop 1 drop every evening.      brimonidine-timolol (COMBIGAN) 0.2-0.5 % Drop Place 1 drop into both eyes.      psyllium (METAMUCIL) powder Take 1 packet by mouth once daily. Pt takes about twice a week      tamsulosin (FLOMAX) 0.4 mg Cap Take 0.4 mg by mouth once daily.      temazepam (RESTORIL) 7.5 MG Cap Take 15 mg by mouth nightly as needed.       No current facility-administered medications on file prior to visit.       /70 (BP Location: Right arm, Patient Position: Sitting)   Pulse 70   Ht 6' (1.829 m)   Wt 86 kg (189 lb 9.5 oz)   SpO2 98%   BMI 25.71 kg/m²       Objective:     Physical Exam  Constitutional:       General: He is not in acute distress.     Appearance: Normal appearance. He is well-developed. He is not toxic-appearing or diaphoretic.   HENT:      Head:  Normocephalic and atraumatic.      Nose: Nose normal.   Eyes:      General:         Right eye: No discharge.         Left eye: No discharge.      Conjunctiva/sclera:      Right eye: Right conjunctiva is not injected.      Left eye: Left conjunctiva is not injected.      Pupils: Pupils are equal.      Right eye: Pupil is round.      Left eye: Pupil is round.   Neck:      Thyroid: No thyromegaly.      Vascular: No carotid bruit or JVD.   Cardiovascular:      Rate and Rhythm: Normal rate and regular rhythm.  No extrasystoles are present.     Chest Wall: PMI is not displaced.      Pulses:           Radial pulses are 2+ on the right side and 2+ on the left side.        Femoral pulses are 2+ on the right side and 2+ on the left side.       Dorsalis pedis pulses are 2+ on the right side and 2+ on the left side.        Posterior tibial pulses are 2+ on the right side and 2+ on the left side.      Heart sounds: S1 normal and S2 normal. No murmur heard.    Gallop present. S4 sounds present.   Pulmonary:      Effort: Pulmonary effort is normal.      Breath sounds: Normal breath sounds.   Chest:      Chest wall: No swelling or tenderness.   Abdominal:      Palpations: Abdomen is soft.      Tenderness: There is no abdominal tenderness.   Musculoskeletal:      Cervical back: Neck supple.      Right ankle: No swelling, deformity or ecchymosis.      Left ankle: No swelling, deformity or ecchymosis.   Lymphadenopathy:      Head:      Right side of head: No submandibular adenopathy.      Left side of head: No submandibular adenopathy.      Cervical: No cervical adenopathy.   Skin:     General: Skin is warm and dry.      Findings: No rash.      Nails: There is no clubbing.      Comments: Mass consistent with lipoma right upper back.   Neurological:      General: No focal deficit present.      Mental Status: He is alert and oriented to person, place, and time. He is not disoriented.      Cranial Nerves: No cranial nerve deficit.    Psychiatric:         Attention and Perception: Attention and perception normal.         Mood and Affect: Mood and affect normal.         Speech: Speech normal.         Behavior: Behavior normal.         Thought Content: Thought content normal.         Cognition and Memory: Cognition and memory normal.         Judgment: Judgment normal.       Assessment:     1. History of chest pain    2. Left bundle branch block    3. History of cerebrovascular accident    4. Primary hypertension    5. Hypercholesterolemia    6. Hypertriglyceridemia    7. Type 2 diabetes mellitus with other circulatory complication, without long-term current use of insulin    8. Overweight        Plan:     1. History of Chest Pain   4/2021: Began experience chest pain.   12/17/2019: Echo: Normal left ventricular size and systolic function.   6/14/2021: Echo: Normal left ventricular size with low normal systolic function. EF 55%. LBBB. Mild diastolic dysfunction.    6/14/2021: Regadenoson MPI: Normal perfusion. EF 63%.   Nothing to suggest obstructive CAD. Chest pain was most certainly non-cardiac in origin.   Chest pain appeared atypical.   Mostly resolved.    2. Left Bundle Branch Block   2021: Diagnosed.   6/2023: Plan next Echo.     3. History of Cerebrovascular Accident   12/2019: Thalamic CVA. Left arm weakness.   All risk factors are addressed.   On aspirin 81 mg Q24.     4. Hypertension   2016: Diagnosed.    7/12/2021: Amlodipine 10 mg Q24 was begun as running high.    On amlodipine 10 mg Q24 and benazepril 20 mg Q24.   Keeping log at home.   Well controlled.    5. Hypercholesterolemia   2016: Began statin.    On atorvastatin 40 mg Q24.   12/16/2019: Chol 153. HDL 46. LDL 62. .   On atorvastatin 40 mg Q24.   Favorable lipid panel.    6. Hypertriglyceridemia   2016: Diagnosed.   As above.    7. Diabetes Mellitus, Type 2   2018: Diagnosed. Complications: CVA. Medications: Oral agent.    On metformin 500 mg Q12.   Tells me well  controlled.    8. Overweight   6/3/2021: Weight 86 kg. BMI 26.     9. Primary Care   Dr. Jigar Durant.    F/u 6 months.    Kevin Ham M.D.

## 2022-09-20 ENCOUNTER — CLINICAL SUPPORT (OUTPATIENT)
Dept: URGENT CARE | Facility: CLINIC | Age: 72
End: 2022-09-20
Payer: MEDICARE

## 2022-09-20 DIAGNOSIS — Z11.52 ENCOUNTER FOR PREPROCEDURE SCREENING LABORATORY TESTING FOR COVID-19: ICD-10-CM

## 2022-09-20 DIAGNOSIS — Z01.812 ENCOUNTER FOR PREPROCEDURE SCREENING LABORATORY TESTING FOR COVID-19: ICD-10-CM

## 2022-09-20 DIAGNOSIS — Z11.52 ENCOUNTER FOR SCREENING FOR COVID-19: Primary | ICD-10-CM

## 2022-09-20 LAB
CTP QC/QA: YES
SARS-COV-2 RDRP RESP QL NAA+PROBE: NEGATIVE

## 2022-09-20 PROCEDURE — U0002: ICD-10-PCS | Mod: QW,CR,S$GLB, | Performed by: NURSE PRACTITIONER

## 2022-09-20 PROCEDURE — U0002 COVID-19 LAB TEST NON-CDC: HCPCS | Mod: QW,CR,S$GLB, | Performed by: NURSE PRACTITIONER

## 2022-09-20 NOTE — PATIENT INSTRUCTIONS
Your test was NEGATIVE for COVID-19 (coronavirus).      You may leave home and/or return to work when the following conditions are met:  24 hours fever free without fever-reducing medications AND  Improved symptoms  You are fully vaccinated or have not had close contact with someone with COVID-19 (within 6 feet for 15 minutes or more)    If you are fully vaccinated and had a close contact, there is no need for quarantine, unless you develop symptoms.      If you are not fully vaccinated and had a close contact:  Retest at 5 to 7 days post-exposure  If possible, it is recommended that you quarantine for 5 days from the time of contact regardless of your test status.  A mask should be worn indoors post quarantine.    If your symptoms worsen or if you have any other concerns, please contact [facility] at [phone].     Sincerely,    Oneil Herrera MA

## 2022-09-26 ENCOUNTER — CLINICAL SUPPORT (OUTPATIENT)
Dept: URGENT CARE | Facility: CLINIC | Age: 72
End: 2022-09-26
Payer: MEDICARE

## 2022-09-26 DIAGNOSIS — Z11.52 ENCOUNTER FOR SCREENING FOR COVID-19: Primary | ICD-10-CM

## 2022-09-26 LAB
CTP QC/QA: YES
SARS-COV-2 RDRP RESP QL NAA+PROBE: NEGATIVE

## 2022-09-26 PROCEDURE — U0002 COVID-19 LAB TEST NON-CDC: HCPCS | Mod: QW,CR,S$GLB, | Performed by: FAMILY MEDICINE

## 2022-09-26 PROCEDURE — U0002: ICD-10-PCS | Mod: QW,CR,S$GLB, | Performed by: FAMILY MEDICINE

## 2023-01-13 ENCOUNTER — OFFICE VISIT (OUTPATIENT)
Dept: CARDIOLOGY | Facility: CLINIC | Age: 73
End: 2023-01-13
Attending: INTERNAL MEDICINE
Payer: MEDICARE

## 2023-01-13 VITALS
SYSTOLIC BLOOD PRESSURE: 146 MMHG | HEART RATE: 80 BPM | HEIGHT: 72 IN | WEIGHT: 199.88 LBS | BODY MASS INDEX: 27.07 KG/M2 | OXYGEN SATURATION: 99 % | DIASTOLIC BLOOD PRESSURE: 86 MMHG

## 2023-01-13 DIAGNOSIS — Z87.898 HISTORY OF CHEST PAIN: ICD-10-CM

## 2023-01-13 DIAGNOSIS — E11.59 TYPE 2 DIABETES MELLITUS WITH OTHER CIRCULATORY COMPLICATION, WITHOUT LONG-TERM CURRENT USE OF INSULIN: ICD-10-CM

## 2023-01-13 DIAGNOSIS — E78.1 HYPERTRIGLYCERIDEMIA: ICD-10-CM

## 2023-01-13 DIAGNOSIS — I44.7 LEFT BUNDLE BRANCH BLOCK: ICD-10-CM

## 2023-01-13 DIAGNOSIS — E78.00 HYPERCHOLESTEROLEMIA: ICD-10-CM

## 2023-01-13 DIAGNOSIS — I50.22 HEART FAILURE, SYSTOLIC, CHRONIC: ICD-10-CM

## 2023-01-13 DIAGNOSIS — I10 PRIMARY HYPERTENSION: ICD-10-CM

## 2023-01-13 DIAGNOSIS — Z86.73 HISTORY OF CEREBROVASCULAR ACCIDENT: ICD-10-CM

## 2023-01-13 DIAGNOSIS — E66.3 OVERWEIGHT: ICD-10-CM

## 2023-01-13 PROCEDURE — 99214 PR OFFICE/OUTPT VISIT, EST, LEVL IV, 30-39 MIN: ICD-10-PCS | Mod: S$PBB,,, | Performed by: INTERNAL MEDICINE

## 2023-01-13 PROCEDURE — 99214 OFFICE O/P EST MOD 30 MIN: CPT | Mod: S$PBB,,, | Performed by: INTERNAL MEDICINE

## 2023-01-13 PROCEDURE — 99999 PR PBB SHADOW E&M-EST. PATIENT-LVL III: ICD-10-PCS | Mod: PBBFAC,,, | Performed by: INTERNAL MEDICINE

## 2023-01-13 PROCEDURE — 99999 PR PBB SHADOW E&M-EST. PATIENT-LVL III: CPT | Mod: PBBFAC,,, | Performed by: INTERNAL MEDICINE

## 2023-01-13 PROCEDURE — 99213 OFFICE O/P EST LOW 20 MIN: CPT | Mod: PBBFAC | Performed by: INTERNAL MEDICINE

## 2023-01-13 RX ORDER — AMLODIPINE BESYLATE 10 MG/1
10 TABLET ORAL DAILY
Qty: 90 TABLET | Refills: 3 | Status: SHIPPED | OUTPATIENT
Start: 2023-01-13 | End: 2023-07-07

## 2023-01-13 RX ORDER — BENAZEPRIL HYDROCHLORIDE 20 MG/1
20 TABLET ORAL DAILY
Qty: 90 TABLET | Refills: 3 | Status: SHIPPED | OUTPATIENT
Start: 2023-01-13 | End: 2023-07-07 | Stop reason: SDUPTHER

## 2023-01-13 RX ORDER — ATORVASTATIN CALCIUM 40 MG/1
40 TABLET, FILM COATED ORAL DAILY
Qty: 90 TABLET | Refills: 3 | Status: SHIPPED | OUTPATIENT
Start: 2023-01-13 | End: 2023-01-17

## 2023-01-13 RX ORDER — ASPIRIN 81 MG/1
81 TABLET ORAL DAILY
Qty: 90 TABLET | Refills: 3 | Status: SHIPPED | OUTPATIENT
Start: 2023-01-13 | End: 2023-07-07 | Stop reason: SDUPTHER

## 2023-01-13 NOTE — PROGRESS NOTES
Subjective:     Calderon Burt Jr. is a 72 y.o. male with hypertension, hypercholesterolemia, hypertriglyceridemia and diabetes mellitus type 2. He is overweight. In 12/2019 he suffered a thalamic cerebrovascular accident . He presented with weakness in the left arm. In 4/2021 he began to experience a left sided chest discomfort. The pain came on when he was moving furniture at home. He was noted to have a left bundle branch block. On 6/14/2021 he had an echocardiogram that revealed normal left ventricular size with low normal systolic function with an ejection fraction of 55%. The septum contracted as with left bundle branch block. There was mild diastolic dysfunction. On 6/14/2021 he had a regadenoson MPI that revealed normal perfusion. The ejection fraction was 63%. Accordingly there was nothing to suggest obstructive coronary artery disease. It appeared the chest pain most certainly was non cardiac in origin.  He has not had any chest pain since. No exertional shortness of breath. No palpitations or weak spells. No issues with any of his prescribed medications. Feeling well.          Chest Pain   This is a chronic problem. The current episode started more than 1 year ago. The problem has been resolved. Pertinent negatives include no abdominal pain, back pain, claudication, cough, diaphoresis, dizziness, exertional chest pressure, fever, headaches, hemoptysis, irregular heartbeat, leg pain, lower extremity edema, malaise/fatigue, nausea, near-syncope, numbness, orthopnea, palpitations, PND, shortness of breath, sputum production, syncope, vomiting or weakness.   His past medical history is significant for diabetes and hyperlipidemia.   Pertinent negatives for past medical history include no muscle weakness.   Cerebrovascular Accident  This is a chronic problem. Pertinent negatives include no abdominal pain, anorexia, arthralgias, change in bowel habit, chest pain, chills, congestion, coughing, diaphoresis,  fatigue, fever, headaches, joint swelling, myalgias, nausea, neck pain, numbness, rash, sore throat, swollen glands, urinary symptoms, vertigo, visual change, vomiting or weakness.   Hypertension  This is a chronic problem. The current episode started more than 1 year ago. The problem is controlled (usually 125-130/75-80 mmHg at home). Pertinent negatives include no anxiety, blurred vision, chest pain, headaches, malaise/fatigue, neck pain, orthopnea, palpitations, peripheral edema, PND, shortness of breath or sweats. There is no history of chronic renal disease.   Hyperlipidemia  This is a chronic problem. The current episode started more than 1 year ago. Recent lipid tests were reviewed and are normal. Exacerbating diseases include diabetes. He has no history of chronic renal disease, hypothyroidism, liver disease, obesity or nephrotic syndrome. Pertinent negatives include no chest pain, focal sensory loss, focal weakness, leg pain, myalgias or shortness of breath.     Review of Systems   Constitutional: Negative for chills, diaphoresis, fatigue, fever and malaise/fatigue.   HENT:  Negative for congestion, nosebleeds and sore throat.    Eyes:  Negative for blurred vision, double vision, vision loss in left eye and vision loss in right eye.   Cardiovascular:  Negative for chest pain, claudication, dyspnea on exertion, irregular heartbeat, leg swelling, near-syncope, orthopnea, palpitations, paroxysmal nocturnal dyspnea and syncope.   Respiratory:  Negative for cough, hemoptysis, shortness of breath, sputum production and wheezing.    Endocrine: Negative for cold intolerance and heat intolerance.   Hematologic/Lymphatic: Negative for bleeding problem. Does not bruise/bleed easily.   Skin:  Negative for color change and rash.   Musculoskeletal:  Negative for arthralgias, back pain, falls, joint swelling, muscle weakness, myalgias and neck pain.   Gastrointestinal:  Negative for abdominal pain, anorexia, change in  bowel habit, heartburn, hematemesis, hematochezia, hemorrhoids, jaundice, melena, nausea and vomiting.   Genitourinary:  Negative for dysuria and hematuria.   Neurological:  Negative for dizziness, focal weakness, headaches, light-headedness, loss of balance, numbness, tremors, vertigo and weakness.   Psychiatric/Behavioral:  Negative for altered mental status, depression and memory loss. The patient is not nervous/anxious.    Allergic/Immunologic: Negative for hives and persistent infections.       Current Outpatient Medications on File Prior to Visit   Medication Sig Dispense Refill    amLODIPine (NORVASC) 10 MG tablet Take 1 tablet (10 mg total) by mouth once daily. 90 tablet 3    aspirin (ECOTRIN) 81 MG EC tablet Take 1 tablet (81 mg total) by mouth once daily. 90 tablet 3    atorvastatin (LIPITOR) 40 MG tablet Take 1 tablet (40 mg total) by mouth once daily. 90 tablet 3    benazepriL (LOTENSIN) 20 MG tablet Take 1 tablet (20 mg total) by mouth once daily. 90 tablet 3    bimatoprost (LUMIGAN) 0.01 % Drop 1 drop every evening.      brimonidine-timoloL (COMBIGAN) 0.2-0.5 % Drop Place 1 drop into both eyes.      metFORMIN (GLUMETZA) 1000 MG (MOD) 24 hr tablet Take 1,000 mg by mouth 2 (two) times daily with meals.      psyllium (METAMUCIL) powder Take 1 packet by mouth once daily. Pt takes about twice a week      tamsulosin (FLOMAX) 0.4 mg Cap Take 0.4 mg by mouth once daily.      temazepam (RESTORIL) 7.5 MG Cap Take 15 mg by mouth nightly as needed.       No current facility-administered medications on file prior to visit.       BP (!) 146/86 (BP Location: Right arm, Patient Position: Sitting, BP Method: Large (Manual))   Pulse 80   Ht 6' (1.829 m)   Wt 90.6 kg (199 lb 13.6 oz)   SpO2 99%   BMI 27.10 kg/m²       Objective:     Physical Exam  Constitutional:       General: He is not in acute distress.     Appearance: Normal appearance. He is well-developed. He is not toxic-appearing or diaphoretic.   HENT:       Head: Normocephalic and atraumatic.      Nose: Nose normal.   Eyes:      General:         Right eye: No discharge.         Left eye: No discharge.      Conjunctiva/sclera:      Right eye: Right conjunctiva is not injected.      Left eye: Left conjunctiva is not injected.      Pupils: Pupils are equal.      Right eye: Pupil is round.      Left eye: Pupil is round.   Neck:      Thyroid: No thyromegaly.      Vascular: No carotid bruit or JVD.   Cardiovascular:      Rate and Rhythm: Normal rate and regular rhythm. No extrasystoles are present.     Chest Wall: PMI is not displaced.      Pulses:           Radial pulses are 2+ on the right side and 2+ on the left side.        Femoral pulses are 2+ on the right side and 2+ on the left side.       Dorsalis pedis pulses are 2+ on the right side and 2+ on the left side.        Posterior tibial pulses are 2+ on the right side and 2+ on the left side.      Heart sounds: S1 normal and S2 normal. No murmur heard.    Gallop present. S4 sounds present.   Pulmonary:      Effort: Pulmonary effort is normal.      Breath sounds: Normal breath sounds.   Chest:      Chest wall: No swelling or tenderness.   Abdominal:      Palpations: Abdomen is soft.      Tenderness: There is no abdominal tenderness.   Musculoskeletal:      Cervical back: Neck supple.      Right ankle: No swelling, deformity or ecchymosis.      Left ankle: No swelling, deformity or ecchymosis.   Lymphadenopathy:      Head:      Right side of head: No submandibular adenopathy.      Left side of head: No submandibular adenopathy.      Cervical: No cervical adenopathy.   Skin:     General: Skin is warm and dry.      Findings: No rash.      Nails: There is no clubbing.      Comments: Mass consistent with lipoma right upper back.   Neurological:      General: No focal deficit present.      Mental Status: He is alert and oriented to person, place, and time. He is not disoriented.      Cranial Nerves: No cranial nerve deficit.    Psychiatric:         Attention and Perception: Attention and perception normal.         Mood and Affect: Mood and affect normal.         Speech: Speech normal.         Behavior: Behavior normal.         Thought Content: Thought content normal.         Cognition and Memory: Cognition and memory normal.         Judgment: Judgment normal.     Assessment:     1. History of chest pain    2. Left bundle branch block    3. History of cerebrovascular accident    4. Primary hypertension    5. Hypercholesterolemia    6. Hypertriglyceridemia    7. Type 2 diabetes mellitus with other circulatory complication, without long-term current use of insulin    8. Overweight        Plan:     1. History of Chest Pain   4/2021: Began experience chest pain.   12/17/2019: Echo: Normal left ventricular size and systolic function.   6/14/2021: Echo: Normal left ventricular size with low normal systolic function. EF 55%. LBBB. Mild diastolic dysfunction.    6/14/2021: Regadenoson MPI: Normal perfusion. EF 63%.   Nothing to suggest obstructive CAD. Chest pain was most certainly non-cardiac in origin.   Chest pain appeared atypical.   Mostly resolved.    2. Left Bundle Branch Block   2021: Diagnosed.   6/2023: Plan next Echo.     3. History of Cerebrovascular Accident   12/2019: Thalamic CVA. Left arm weakness.   All risk factors are addressed.   On aspirin 81 mg Q24.     4. Hypertension   2016: Diagnosed.    7/12/2021: Amlodipine 10 mg Q24 was begun as running high.    On amlodipine 10 mg Q24 and benazepril 20 mg Q24.   Keeping log at home.   Well controlled.    5. Hypercholesterolemia   2016: Began statin.    On atorvastatin 40 mg Q24.   12/16/2019: Chol 153. HDL 46. LDL 62. .   8/26/2022: Chol 139. HDL 54. LDL 66. .   On atorvastatin 40 mg Q24.   Favorable lipid panel.    6. Hypertriglyceridemia   2016: Diagnosed.   As above.    7. Diabetes Mellitus, Type 2   2018: Diagnosed. Complications: CVA. Medications: Oral agent.    On  metformin 500 mg Q12.   Tells me well controlled.    8. Overweight   6/3/2021: Weight 86 kg. BMI 26.     9. Primary Care   Dr. Jigar Durant.    F/u 6 months.    Kevin Ham M.D.      6/2/2023 1:31 PM, Addendum:    6/2/2023: Echo: Normal left ventricular size with moderate to severe systolic dysfunction. LBBB. EF 30%. Mild MR.    See me soon. Discuss cath an initiation of therapy for LV dysfunction and possible CRT.    I discussed the above test result and the implications of the findings over the phone.    Kevin Ham M.D.

## 2023-01-17 ENCOUNTER — OFFICE VISIT (OUTPATIENT)
Dept: URGENT CARE | Facility: CLINIC | Age: 73
End: 2023-01-17
Payer: MEDICARE

## 2023-01-17 VITALS
WEIGHT: 199.75 LBS | HEIGHT: 72 IN | DIASTOLIC BLOOD PRESSURE: 83 MMHG | SYSTOLIC BLOOD PRESSURE: 133 MMHG | BODY MASS INDEX: 27.06 KG/M2 | OXYGEN SATURATION: 99 % | RESPIRATION RATE: 16 BRPM | TEMPERATURE: 99 F | HEART RATE: 104 BPM

## 2023-01-17 DIAGNOSIS — U07.1 COVID-19: Primary | ICD-10-CM

## 2023-01-17 DIAGNOSIS — R05.9 COUGH, UNSPECIFIED TYPE: ICD-10-CM

## 2023-01-17 LAB
CTP QC/QA: YES
SARS-COV-2 AG RESP QL IA.RAPID: POSITIVE

## 2023-01-17 PROCEDURE — 99214 PR OFFICE/OUTPT VISIT, EST, LEVL IV, 30-39 MIN: ICD-10-PCS | Mod: CS,S$GLB,,

## 2023-01-17 PROCEDURE — 99214 OFFICE O/P EST MOD 30 MIN: CPT | Mod: CS,S$GLB,,

## 2023-01-17 PROCEDURE — 87811 SARS-COV-2 COVID19 W/OPTIC: CPT | Mod: QW,CR,S$GLB,

## 2023-01-17 PROCEDURE — 87811 SARS CORONAVIRUS 2 ANTIGEN POCT, MANUAL READ: ICD-10-PCS | Mod: QW,CR,S$GLB,

## 2023-01-17 RX ORDER — BENZONATATE 200 MG/1
200 CAPSULE ORAL 3 TIMES DAILY PRN
Qty: 30 CAPSULE | Refills: 0 | Status: SHIPPED | OUTPATIENT
Start: 2023-01-17 | End: 2023-01-27

## 2023-01-17 RX ORDER — FLUTICASONE PROPIONATE 50 MCG
1 SPRAY, SUSPENSION (ML) NASAL DAILY
Qty: 9.9 ML | Refills: 0 | Status: SHIPPED | OUTPATIENT
Start: 2023-01-17 | End: 2023-02-16

## 2023-01-17 RX ORDER — METFORMIN HYDROCHLORIDE 1000 MG/1
TABLET ORAL
COMMUNITY
Start: 2022-12-19 | End: 2023-01-17

## 2023-01-17 RX ORDER — NIRMATRELVIR AND RITONAVIR 300-100 MG
KIT ORAL
Qty: 30 TABLET | Refills: 0 | Status: SHIPPED | OUTPATIENT
Start: 2023-01-17 | End: 2023-06-12 | Stop reason: CLARIF

## 2023-01-17 NOTE — PROGRESS NOTES
Subjective:       Patient ID: Calderon Burt Jr. is a 72 y.o. male.    Vitals:  height is 6' (1.829 m) and weight is 90.6 kg (199 lb 11.8 oz). His oral temperature is 99.4 °F (37.4 °C). His blood pressure is 133/83 and his pulse is 104. His respiration is 16 and oxygen saturation is 99%.     Chief Complaint: Cough (With headache)    Patient reports with the compliant of a cough, headache, and congestion that has been present since Saturday. Patient reports with going out to a social event on Saturday and without wearing a mask around greater than 15 people, as he is unsure of being in close contact with any ill contacts. He is vaccinated for covid.     Cough  This is a new problem. The current episode started in the past 7 days. The problem has been unchanged. The problem occurs constantly. Associated symptoms include a fever and headaches. Pertinent negatives include no ear pain, postnasal drip or sore throat. Nothing aggravates the symptoms. He has tried OTC cough suppressant for the symptoms. The treatment provided no relief. There is no history of asthma, bronchiectasis, bronchitis, COPD, emphysema, environmental allergies or pneumonia.     Constitution: Positive for fever.   HENT:  Positive for congestion and sinus pressure. Negative for ear pain, postnasal drip, sinus pain and sore throat.    Respiratory:  Positive for cough.    Gastrointestinal:  Negative for nausea and vomiting.   Allergic/Immunologic: Negative for environmental allergies.   Neurological:  Positive for headaches. Negative for disorientation and altered mental status.   Psychiatric/Behavioral:  Negative for altered mental status and disorientation.      Objective:      Physical Exam   Constitutional: He is oriented to person, place, and time. He appears well-developed. He is cooperative.  Non-toxic appearance. He does not appear ill. No distress.      Comments:Patient sits comfortably in exam chair. Answers questions in complete sentences.  Does not show any signs of distress or discoloration.        HENT:   Head: Normocephalic and atraumatic.   Ears:   Right Ear: Hearing, tympanic membrane, external ear and ear canal normal.   Left Ear: Hearing, tympanic membrane, external ear and ear canal normal.   Nose: Rhinorrhea and congestion present. No mucosal edema or nasal deformity. No epistaxis. Right sinus exhibits no maxillary sinus tenderness and no frontal sinus tenderness. Left sinus exhibits no maxillary sinus tenderness and no frontal sinus tenderness.   Mouth/Throat: Uvula is midline, oropharynx is clear and moist and mucous membranes are normal. No trismus in the jaw. Normal dentition. No uvula swelling. No oropharyngeal exudate, posterior oropharyngeal edema or posterior oropharyngeal erythema.   Eyes: Conjunctivae and lids are normal. No scleral icterus.   Neck: Trachea normal and phonation normal. Neck supple. No edema present. No erythema present. No neck rigidity present.   Cardiovascular: Normal rate, regular rhythm, normal heart sounds and normal pulses.   Pulmonary/Chest: Effort normal and breath sounds normal. No stridor. No respiratory distress. He has no decreased breath sounds. He has no wheezes. He has no rhonchi. He has no rales.   Abdominal: Normal appearance.   Musculoskeletal: Normal range of motion.         General: No deformity. Normal range of motion.   Lymphadenopathy:     He has no cervical adenopathy.        Right cervical: No superficial cervical, no deep cervical and no posterior cervical adenopathy present.       Left cervical: No superficial cervical, no deep cervical and no posterior cervical adenopathy present.   Neurological: He is alert and oriented to person, place, and time. He exhibits normal muscle tone. Coordination normal.   Skin: Skin is warm, dry, intact, not diaphoretic and not pale.   Psychiatric: His speech is normal and behavior is normal. Judgment and thought content normal.   Nursing note and vitals  reviewed.      Results for orders placed or performed in visit on 01/17/23   SARS Coronavirus 2 Antigen, POCT Manual Read   Result Value Ref Range    SARS Coronavirus 2 Antigen Positive (A) Negative     Acceptable Yes    5      Assessment:       1. COVID-19    2. Cough, unspecified type          Plan:         COVID-19  -     nirmatrelvir-ritonavir (PAXLOVID, EUA,) 300 mg (150 mg x 2)-100 mg copackaged tablets (EUA); Take 3 tablets by mouth 2 (two) times daily. Each dose contains 2 nirmatrelvir (pink tablets) and 1 ritonavir (white tablet). Take all 3 tablets together  Dispense: 30 tablet; Refill: 0  -     fluticasone propionate (FLONASE) 50 mcg/actuation nasal spray; 1 spray (50 mcg total) by Each Nostril route once daily.  Dispense: 9.9 mL; Refill: 0    Cough, unspecified type  -     SARS Coronavirus 2 Antigen, POCT Manual Read  -     Cancel: POCT Influenza A/B MOLECULAR  -     benzonatate (TESSALON) 200 MG capsule; Take 1 capsule (200 mg total) by mouth 3 (three) times daily as needed for Cough.  Dispense: 30 capsule; Refill: 0                 Patient Instructions    Directions for taking Paxlovid:   Cholesterol Medications: if you are taking a statin for cholesterol you should start paxlovid 12 hours after your last dose of your statin medication. DO NOT take your statin medication while taking Paxlovid. Restart your statin 5 days after you take your last dose of the Paxlovid. If you are confused or have any question, please call the clinic or your primary care for clarification.     You have tested positive for COVID-19 today.       ISOLATION  If you tested positive and do not have symptoms, you must isolate for 5 days starting on the day of the positive test. I     If you tested positive and have symptoms, you must isolate for 5 days starting on the day of the first symptoms,  not the day of the positive test.     This is the most important part, both the CDC and the [facility] emphasize that you do not  test out of isolation.     After 5 days, if your symptoms have improved and you have not had fever on day 5, you can return to the community on day 6- NO TESTING REQUIRED!      In fact, we do not retest if you were positive in the last 90 days.     After your 5 days of isolation are completed, the CDC recommends strict mask use for the first 5 days that you come out of isolation.    - You must understand that you have received an Urgent Care treatment only and that you may be released before all of your medical problems are known or treated.   - You, the patient, will arrange for follow up care as instructed.   - If your condition worsens or fails to improve we recommend that you receive another evaluation at the ER immediately or contact your PCP to discuss your concerns or return here.   - Follow up with your PCP or specialty clinic as directed in the next 1-2 weeks if not improved or as needed.  You can call [phone] to schedule an appointment with the appropriate provider.    If your symptoms do not improve or worsen, go to the emergency room immediately.

## 2023-01-17 NOTE — PATIENT INSTRUCTIONS
Directions for taking Paxlovid:   Cholesterol Medications: if you are taking a statin for cholesterol you should start paxlovid 12 hours after your last dose of your statin medication. DO NOT take your statin medication while taking Paxlovid. Restart your statin 5 days after you take your last dose of the Paxlovid. If you are confused or have any question, please call the clinic or your primary care for clarification.     You have tested positive for COVID-19 today.       ISOLATION  If you tested positive and do not have symptoms, you must isolate for 5 days starting on the day of the positive test. I     If you tested positive and have symptoms, you must isolate for 5 days starting on the day of the first symptoms,  not the day of the positive test.     This is the most important part, both the CDC and the [facility] emphasize that you do not test out of isolation.     After 5 days, if your symptoms have improved and you have not had fever on day 5, you can return to the community on day 6- NO TESTING REQUIRED!      In fact, we do not retest if you were positive in the last 90 days.     After your 5 days of isolation are completed, the CDC recommends strict mask use for the first 5 days that you come out of isolation.    - You must understand that you have received an Urgent Care treatment only and that you may be released before all of your medical problems are known or treated.   - You, the patient, will arrange for follow up care as instructed.   - If your condition worsens or fails to improve we recommend that you receive another evaluation at the ER immediately or contact your PCP to discuss your concerns or return here.   - Follow up with your PCP or specialty clinic as directed in the next 1-2 weeks if not improved or as needed.  You can call [phone] to schedule an appointment with the appropriate provider.    If your symptoms do not improve or worsen, go to the emergency room immediately.

## 2023-02-07 ENCOUNTER — HOSPITAL ENCOUNTER (EMERGENCY)
Facility: HOSPITAL | Age: 73
Discharge: HOME | End: 2023-02-08
Attending: EMERGENCY MEDICINE
Payer: MEDICARE

## 2023-02-07 DIAGNOSIS — R07.9 CHEST PAIN: ICD-10-CM

## 2023-02-07 DIAGNOSIS — R07.9 CHEST PAIN, UNSPECIFIED TYPE: Primary | ICD-10-CM

## 2023-02-07 LAB
ALBUMIN SERPL BCP-MCNC: 3.7 G/DL (ref 3.5–5.2)
ALP SERPL-CCNC: 56 U/L (ref 55–135)
ALT SERPL W/O P-5'-P-CCNC: 28 U/L (ref 10–44)
ANION GAP SERPL CALC-SCNC: 10 MMOL/L (ref 8–16)
AST SERPL-CCNC: 22 U/L (ref 10–40)
BASOPHILS # BLD AUTO: 0.05 K/UL (ref 0–0.2)
BASOPHILS NFR BLD: 0.7 % (ref 0–1.9)
BILIRUB SERPL-MCNC: 0.3 MG/DL (ref 0.1–1)
BNP SERPL-MCNC: 12 PG/ML (ref 0–99)
BUN SERPL-MCNC: 17 MG/DL (ref 8–23)
CALCIUM SERPL-MCNC: 8.8 MG/DL (ref 8.7–10.5)
CHLORIDE SERPL-SCNC: 104 MMOL/L (ref 95–110)
CO2 SERPL-SCNC: 23 MMOL/L (ref 23–29)
CREAT SERPL-MCNC: 1.1 MG/DL (ref 0.5–1.4)
D DIMER PPP IA.FEU-MCNC: 0.29 MG/L FEU
DIFFERENTIAL METHOD: ABNORMAL
EOSINOPHIL # BLD AUTO: 0.1 K/UL (ref 0–0.5)
EOSINOPHIL NFR BLD: 1.4 % (ref 0–8)
ERYTHROCYTE [DISTWIDTH] IN BLOOD BY AUTOMATED COUNT: 12.3 % (ref 11.5–14.5)
EST. GFR  (NO RACE VARIABLE): >60 ML/MIN/1.73 M^2
GLUCOSE SERPL-MCNC: 154 MG/DL (ref 70–110)
HCT VFR BLD AUTO: 33.8 % (ref 40–54)
HGB BLD-MCNC: 11.3 G/DL (ref 14–18)
IMM GRANULOCYTES # BLD AUTO: 0.02 K/UL (ref 0–0.04)
IMM GRANULOCYTES NFR BLD AUTO: 0.3 % (ref 0–0.5)
LYMPHOCYTES # BLD AUTO: 2.3 K/UL (ref 1–4.8)
LYMPHOCYTES NFR BLD: 33.5 % (ref 18–48)
MCH RBC QN AUTO: 30.3 PG (ref 27–31)
MCHC RBC AUTO-ENTMCNC: 33.4 G/DL (ref 32–36)
MCV RBC AUTO: 91 FL (ref 82–98)
MONOCYTES # BLD AUTO: 0.6 K/UL (ref 0.3–1)
MONOCYTES NFR BLD: 7.9 % (ref 4–15)
NEUTROPHILS # BLD AUTO: 3.9 K/UL (ref 1.8–7.7)
NEUTROPHILS NFR BLD: 56.2 % (ref 38–73)
NRBC BLD-RTO: 0 /100 WBC
PLATELET # BLD AUTO: 188 K/UL (ref 150–450)
PMV BLD AUTO: 10.4 FL (ref 9.2–12.9)
POC CARDIAC TROPONIN I: 0 NG/ML (ref 0–0.08)
POTASSIUM SERPL-SCNC: 3.6 MMOL/L (ref 3.5–5.1)
PROT SERPL-MCNC: 7.1 G/DL (ref 6–8.4)
RBC # BLD AUTO: 3.73 M/UL (ref 4.6–6.2)
SAMPLE: NORMAL
SARS-COV-2 RDRP RESP QL NAA+PROBE: NEGATIVE
SODIUM SERPL-SCNC: 137 MMOL/L (ref 136–145)
TROPONIN I SERPL DL<=0.01 NG/ML-MCNC: <0.006 NG/ML (ref 0–0.03)
WBC # BLD AUTO: 6.99 K/UL (ref 3.9–12.7)

## 2023-02-07 PROCEDURE — 25000003 PHARM REV CODE 250: Performed by: PHYSICIAN ASSISTANT

## 2023-02-07 PROCEDURE — 99284 EMERGENCY DEPT VISIT MOD MDM: CPT | Mod: CS,,, | Performed by: EMERGENCY MEDICINE

## 2023-02-07 PROCEDURE — 99284 PR EMERGENCY DEPT VISIT,LEVEL IV: ICD-10-PCS | Mod: CS,,, | Performed by: EMERGENCY MEDICINE

## 2023-02-07 PROCEDURE — 80053 COMPREHEN METABOLIC PANEL: CPT | Performed by: PHYSICIAN ASSISTANT

## 2023-02-07 PROCEDURE — 84484 ASSAY OF TROPONIN QUANT: CPT | Performed by: PHYSICIAN ASSISTANT

## 2023-02-07 PROCEDURE — 93010 ELECTROCARDIOGRAM REPORT: CPT | Mod: ,,, | Performed by: INTERNAL MEDICINE

## 2023-02-07 PROCEDURE — 85025 COMPLETE CBC W/AUTO DIFF WBC: CPT | Performed by: PHYSICIAN ASSISTANT

## 2023-02-07 PROCEDURE — 93005 ELECTROCARDIOGRAM TRACING: CPT

## 2023-02-07 PROCEDURE — U0002 COVID-19 LAB TEST NON-CDC: HCPCS | Performed by: PHYSICIAN ASSISTANT

## 2023-02-07 PROCEDURE — 93010 EKG 12-LEAD: ICD-10-PCS | Mod: 76,,, | Performed by: INTERNAL MEDICINE

## 2023-02-07 PROCEDURE — 85379 FIBRIN DEGRADATION QUANT: CPT | Performed by: PHYSICIAN ASSISTANT

## 2023-02-07 PROCEDURE — 83880 ASSAY OF NATRIURETIC PEPTIDE: CPT | Performed by: PHYSICIAN ASSISTANT

## 2023-02-07 PROCEDURE — 99285 EMERGENCY DEPT VISIT HI MDM: CPT | Mod: 25

## 2023-02-07 PROCEDURE — 93010 ELECTROCARDIOGRAM REPORT: CPT | Mod: 76,,, | Performed by: INTERNAL MEDICINE

## 2023-02-07 RX ORDER — ASPIRIN 325 MG
325 TABLET ORAL
Status: COMPLETED | OUTPATIENT
Start: 2023-02-07 | End: 2023-02-07

## 2023-02-07 RX ADMIN — ASPIRIN 325 MG: 325 TABLET ORAL at 10:02

## 2023-02-08 VITALS
RESPIRATION RATE: 18 BRPM | OXYGEN SATURATION: 100 % | DIASTOLIC BLOOD PRESSURE: 70 MMHG | HEART RATE: 69 BPM | TEMPERATURE: 98 F | WEIGHT: 190 LBS | SYSTOLIC BLOOD PRESSURE: 121 MMHG | BODY MASS INDEX: 25.77 KG/M2

## 2023-02-08 LAB
POC CARDIAC TROPONIN I: 0 NG/ML (ref 0–0.08)
SAMPLE: NORMAL
TROPONIN I SERPL DL<=0.01 NG/ML-MCNC: <0.006 NG/ML (ref 0–0.03)

## 2023-02-08 PROCEDURE — 84484 ASSAY OF TROPONIN QUANT: CPT | Mod: 91

## 2023-02-08 PROCEDURE — 84484 ASSAY OF TROPONIN QUANT: CPT | Performed by: PHYSICIAN ASSISTANT

## 2023-02-08 RX ORDER — TIZANIDINE 4 MG/1
4 TABLET ORAL EVERY 8 HOURS
Qty: 21 TABLET | Refills: 0 | Status: SHIPPED | OUTPATIENT
Start: 2023-02-08 | End: 2023-02-15

## 2023-02-08 RX ORDER — ORPHENADRINE CITRATE 30 MG/ML
60 INJECTION INTRAMUSCULAR; INTRAVENOUS
Status: DISCONTINUED | OUTPATIENT
Start: 2023-02-08 | End: 2023-02-08 | Stop reason: HOSPADM

## 2023-02-08 NOTE — ED PROVIDER NOTES
Encounter Date: 2/7/2023       History     Chief Complaint   Patient presents with    Chest Pain     8/10 Non-radiating midsternal CP that started around 1400/1500. Denies n/v and mild SOB.      Calderon Burt Jr. is a 71 y.o. male with hypertension, hypercholesterolemia, hypertriglyceridemia and diabetes mellitus type 2. He is overweight. In 12/2019 he suffered a thalamic cerebrovascular accident when he presented with weakness in the left arm. In 4/2021 he began to experience a left sided chest discomfort. The pain came on when he was moving furniture at home. He has not had any chest pain since. On 6/14/2021 he had an echocardiogram that revealed normal left ventricular size with low normal systolic function with an ejection fraction of 55%. The septum contracted as with left bundle branch block. There was mild diastolic dysfunction. On 6/14/2021 he had a regadenoson MPI that revealed normal perfusion. The ejection fraction was 63%. Accordingly there was nothing to suggest obstructive coronary artery disease and it appeared the chest pain most certainly was non-cardiac in origin. He has been noted to have left bundle branch block.      He presents to the emergency department tonight with onset of chest discomfort around 2 or 3:00 p.m. not any alleviating or exacerbating factors.  Denies any trauma or injury.  Endorsed nausea, no vomiting.  No left arm or jaw pain.  Took Tylenol and chest pain has resolved.  Denies any chest pain or shortness of breath at this time.  Appears well and nontoxic.  Vital signs are normal.    Review of patient's allergies indicates:  No Known Allergies  Past Medical History:   Diagnosis Date    Cataract     Diabetes mellitus     Glaucoma     High cholesterol     Hypertension     Tendonitis      Past Surgical History:   Procedure Laterality Date    ARTHROSCOPIC REPAIR OF ROTATOR CUFF OF SHOULDER Right 8/28/2020    Procedure: REPAIR, ROTATOR CUFF, ARTHROSCOPIC;  Surgeon: CHENCHO Reyes,  MD;  Location: [facility] OR;  Service: Orthopedics;  Laterality: Right;  regional with catheter-interscalene  pericapsular injection: 30cc clonidine/epi/ketorolac/ropivacaine injection    CARPAL TUNNEL RELEASE      right/left    CATARACT EXTRACTION, BILATERAL      FIXATION OF TENDON Right 8/28/2020    Procedure: FIXATION, TENDON-BICEPS TENODESIS;  Surgeon: CHENCHO Reyes MD;  Location: [facility] OR;  Service: Orthopedics;  Laterality: Right;  FIXATION, TENDON-BICEPS TENODESIS    HEMORRHOID SURGERY      SHOULDER OPEN ROTATOR CUFF REPAIR      left    WRIST SURGERY      right/left     Family History   Problem Relation Age of Onset    Diabetes Mother     Alcohol abuse Mother     Hypertension Mother     Stroke Father     Alcohol abuse Father     Hypertension Father     Diabetes Sister     Hypertension Sister     Diabetes Brother     Hypertension Brother     Diabetes Daughter     Diabetes Son     Vision loss Son     Stroke Son      Social History     Tobacco Use    Smoking status: Former     Passive exposure: Past    Smokeless tobacco: Never   Substance Use Topics    Alcohol use: No    Drug use: No     Review of Systems   Constitutional:  Negative for fever.   HENT:  Negative for sore throat.    Respiratory:  Negative for shortness of breath.    Cardiovascular:  Positive for chest pain.   Gastrointestinal:  Negative for nausea.   Genitourinary:  Negative for dysuria.   Musculoskeletal:  Negative for back pain.   Skin:  Negative for rash.   Neurological:  Negative for weakness.   Hematological:  Does not bruise/bleed easily.     Physical Exam     Initial Vitals [02/07/23 2145]   BP Pulse Resp Temp SpO2   (!) 147/71 83 16 98.3 °F (36.8 °C) (!) 83 %      MAP       --         Physical Exam    Constitutional: Vital signs are normal. He appears well-developed and well-nourished.   HENT:   Head: Normocephalic and atraumatic.   Right Ear: Hearing normal.   Left Ear: Hearing normal.   Eyes: Conjunctivae are normal.   Cardiovascular:   Normal rate and regular rhythm.           No lower extremity edema, no JVD   Pulmonary/Chest:   Clear on exam   Abdominal: Abdomen is soft. Bowel sounds are normal.   Musculoskeletal:         General: Normal range of motion.     Neurological: He is alert and oriented to person, place, and time.   Skin: Skin is warm and intact.   Psychiatric: He has a normal mood and affect. His speech is normal and behavior is normal. Cognition and memory are normal.       ED Course   Procedures  Labs Reviewed   CBC W/ AUTO DIFFERENTIAL - Abnormal; Notable for the following components:       Result Value    RBC 3.73 (*)     Hemoglobin 11.3 (*)     Hematocrit 33.8 (*)     All other components within normal limits   COMPREHENSIVE METABOLIC PANEL - Abnormal; Notable for the following components:    Glucose 154 (*)     All other components within normal limits   TROPONIN I   TROPONIN I   B-TYPE NATRIURETIC PEPTIDE   SARS-COV-2 RNA AMPLIFICATION, QUAL   D DIMER, QUANTITATIVE   TROPONIN ISTAT   POCT TROPONIN   POCT TROPONIN          Imaging Results              X-Ray Chest AP Portable (Final result)  Result time 02/08/23 00:06:21      Final result by Bola Isabel MD (02/08/23 00:06:21)                   Impression:      No convincing radiographic evidence of acute intrathoracic process on this single view.      Electronically signed by: Bola Isabel MD  Date:    02/08/2023  Time:    00:06               Narrative:    EXAMINATION:  XR CHEST AP PORTABLE    CLINICAL HISTORY:  Chest Pain;    TECHNIQUE:  Single frontal view of the chest was performed.    COMPARISON:  01/16/2018    FINDINGS:  Cardiac monitoring leads overlie the chest.  The cardiac silhouette appears within normal limits.  There are well expanded without evidence of confluent airspace consolidation.  No significant volume of pleural fluid or pneumothorax identified.  Osseous structures demonstrate mild degenerative changes.                                        Medications   orphenadrine injection 60 mg (has no administration in time range)   aspirin tablet 325 mg (325 mg Oral Given 2/7/23 2224)     Medical Decision Making:   History:   Old Medical Records: I decided to obtain old medical records.  Old Records Summarized: records from clinic visits.  Initial Assessment:   72-year-old male with chest pain  Differential Diagnosis:   ACS, cardiac arrhythmia, pneumonia, post COVID infection, pulmonary edema, CHF, cardiomyopathy, GERD  Independently Interpreted Test(s):   I have ordered and independently interpreted X-rays - see summary below.       <> Summary of X-Ray Reading(s): No pneumonia  I have ordered and independently interpreted EKG Reading(s) - see summary below       <> Summary of EKG Reading(s): Normal sinus rhythm, left bundle, rate 70  Clinical Tests:   Lab Tests: Ordered and Reviewed  Radiological Study: Ordered and Reviewed  Medical Tests: Ordered and Reviewed  ED Management:  Plan:  Labs, chest x-ray and reassessment.  Initial pulse ox of 83% was inaccurate.  Patient is satting well on room air and nondistressed.  Not tachypneic or tachycardic.     1:30 a.m. assessment  Considered but doubt ACS as his workup was unrevealing.  There was no evidence of pneumonia on imaging.  No signs of CHF.  D-dimer negative, I doubt PE  Labs without acute abnormalities, troponin x2 negative.  Chest x-ray without acute intrathoracic process, patient remains chest pain-free.  Suspect etiology likely musculoskeletal, will treat with muscle relaxants and anti-inflammatory medication.  Return precautions given.  Patient stable for discharge.                        Clinical Impression:   Final diagnoses:  [R07.9] Chest pain  [R07.9] Chest pain, unspecified type (Primary)        ED Disposition Condition    Discharge Stable          ED Prescriptions       Medication Sig Dispense Start Date End Date Auth. Provider    tiZANidine (ZANAFLEX) 4 MG tablet Take 1 tablet (4 mg total) by  mouth every 8 (eight) hours. for 7 days 21 tablet 2/8/2023 2/15/2023 Arun Hutchison PA-C          Follow-up Information       Follow up With Specialties Details Why Contact Info    Jigar Durant III, MD Internal Medicine   [address] [place] [id number]  [phone]               Arun Hutchison PA-C  02/08/23 0138

## 2023-02-08 NOTE — DISCHARGE INSTRUCTIONS

## 2023-06-01 ENCOUNTER — HOSPITAL ENCOUNTER (OUTPATIENT)
Dept: CARDIOLOGY | Facility: OTHER | Age: 73
Discharge: HOME | End: 2023-06-01
Attending: INTERNAL MEDICINE
Payer: MEDICARE

## 2023-06-01 VITALS
HEIGHT: 72 IN | BODY MASS INDEX: 25.73 KG/M2 | DIASTOLIC BLOOD PRESSURE: 83 MMHG | WEIGHT: 190 LBS | SYSTOLIC BLOOD PRESSURE: 133 MMHG

## 2023-06-01 DIAGNOSIS — I44.7 LEFT BUNDLE BRANCH BLOCK: ICD-10-CM

## 2023-06-01 PROCEDURE — 93306 TTE W/DOPPLER COMPLETE: CPT | Mod: 26,,, | Performed by: INTERNAL MEDICINE

## 2023-06-01 PROCEDURE — 93306 ECHO (CUPID ONLY): ICD-10-PCS | Mod: 26,,, | Performed by: INTERNAL MEDICINE

## 2023-06-01 PROCEDURE — 93306 TTE W/DOPPLER COMPLETE: CPT

## 2023-06-02 ENCOUNTER — TELEPHONE (OUTPATIENT)
Dept: CARDIOLOGY | Facility: CLINIC | Age: 73
End: 2023-06-02
Payer: MEDICARE

## 2023-06-02 PROBLEM — I50.22 HEART FAILURE, SYSTOLIC, CHRONIC: Status: ACTIVE | Noted: 2023-06-02

## 2023-06-02 LAB
ASCENDING AORTA: 2.68 CM
AV INDEX (PROSTH): 0.61
AV MEAN GRADIENT: 2 MMHG
AV PEAK GRADIENT: 3 MMHG
AV VALVE AREA: 1.95 CM2
AV VELOCITY RATIO: 0.67
BSA FOR ECHO PROCEDURE: 2.09 M2
CV ECHO LV RWT: 0.39 CM
DOP CALC AO PEAK VEL: 0.91 M/S
DOP CALC AO VTI: 19.2 CM
DOP CALC LVOT AREA: 3.2 CM2
DOP CALC LVOT DIAMETER: 2.02 CM
DOP CALC LVOT PEAK VEL: 0.61 M/S
DOP CALC LVOT STROKE VOLUME: 37.48 CM3
DOP CALCLVOT PEAK VEL VTI: 11.7 CM
ECHO LV POSTERIOR WALL: 0.85 CM (ref 0.6–1.1)
EJECTION FRACTION: 30 %
FRACTIONAL SHORTENING: 14 % (ref 28–44)
INTERVENTRICULAR SEPTUM: 0.81 CM (ref 0.6–1.1)
IVC DIAMETER: 1.04 CM
LA MAJOR: 4.48 CM
LA MINOR: 4.37 CM
LA WIDTH: 3.4 CM
LEFT ATRIUM SIZE: 2.64 CM
LEFT ATRIUM VOLUME INDEX MOD: 19.7 ML/M2
LEFT ATRIUM VOLUME INDEX: 16.2 ML/M2
LEFT ATRIUM VOLUME MOD: 41 CM3
LEFT ATRIUM VOLUME: 33.76 CM3
LEFT INTERNAL DIMENSION IN SYSTOLE: 3.77 CM (ref 2.1–4)
LEFT VENTRICLE DIASTOLIC VOLUME INDEX: 41.77 ML/M2
LEFT VENTRICLE DIASTOLIC VOLUME: 86.89 ML
LEFT VENTRICLE MASS INDEX: 55 G/M2
LEFT VENTRICLE SYSTOLIC VOLUME INDEX: 29.2 ML/M2
LEFT VENTRICLE SYSTOLIC VOLUME: 60.74 ML
LEFT VENTRICULAR INTERNAL DIMENSION IN DIASTOLE: 4.38 CM (ref 3.5–6)
LEFT VENTRICULAR MASS: 114.02 G
LVOT MG: 0.81 MMHG
LVOT MV: 0.42 CM/S
PISA MRMAX VEL: 4.04 M/S
PISA TR MAX VEL: 2.4 M/S
PV MV: 0.62 M/S
PV PEAK VELOCITY: 0.89 CM/S
RA MAJOR: 3.75 CM
RA PRESSURE: 3 MMHG
RA WIDTH: 3.3 CM
SINUS: 2.9 CM
STJ: 2.36 CM
TDI LATERAL: 0.04 M/S
TDI SEPTAL: 0.04 M/S
TDI: 0.04 M/S
TR MAX PG: 23 MMHG
TV REST PULMONARY ARTERY PRESSURE: 26 MMHG

## 2023-06-02 NOTE — TELEPHONE ENCOUNTER
Spoke to patient- appt rescheduled to 6/8/23.    ----- Message from Kevin Ham MD sent at 6/2/2023  1:37 PM CDT -----  I discussed the above test result and the implications of the findings over the phone.    See me soon.    Kevin Ham M.D.

## 2023-06-08 ENCOUNTER — OFFICE VISIT (OUTPATIENT)
Dept: CARDIOLOGY | Facility: CLINIC | Age: 73
End: 2023-06-08
Attending: INTERNAL MEDICINE
Payer: MEDICARE

## 2023-06-08 VITALS
SYSTOLIC BLOOD PRESSURE: 148 MMHG | OXYGEN SATURATION: 99 % | WEIGHT: 192.13 LBS | HEIGHT: 72 IN | DIASTOLIC BLOOD PRESSURE: 83 MMHG | HEART RATE: 74 BPM | BODY MASS INDEX: 26.02 KG/M2

## 2023-06-08 DIAGNOSIS — Z86.73 HISTORY OF CEREBROVASCULAR ACCIDENT: ICD-10-CM

## 2023-06-08 DIAGNOSIS — E78.00 HYPERCHOLESTEROLEMIA: ICD-10-CM

## 2023-06-08 DIAGNOSIS — Z87.898 HISTORY OF CHEST PAIN: ICD-10-CM

## 2023-06-08 DIAGNOSIS — I50.22 HEART FAILURE, SYSTOLIC, CHRONIC: ICD-10-CM

## 2023-06-08 DIAGNOSIS — E78.1 HYPERTRIGLYCERIDEMIA: ICD-10-CM

## 2023-06-08 DIAGNOSIS — E11.59 TYPE 2 DIABETES MELLITUS WITH OTHER CIRCULATORY COMPLICATION, WITHOUT LONG-TERM CURRENT USE OF INSULIN: ICD-10-CM

## 2023-06-08 DIAGNOSIS — I10 PRIMARY HYPERTENSION: ICD-10-CM

## 2023-06-08 DIAGNOSIS — I44.7 LEFT BUNDLE BRANCH BLOCK: ICD-10-CM

## 2023-06-08 PROCEDURE — 93005 ELECTROCARDIOGRAM TRACING: CPT

## 2023-06-08 PROCEDURE — 99999 PR PBB SHADOW E&M-EST. PATIENT-LVL III: ICD-10-PCS | Mod: PBBFAC,,, | Performed by: INTERNAL MEDICINE

## 2023-06-08 PROCEDURE — 93010 ELECTROCARDIOGRAM REPORT: CPT | Mod: ,,, | Performed by: INTERNAL MEDICINE

## 2023-06-08 PROCEDURE — 99999 PR PBB SHADOW E&M-EST. PATIENT-LVL III: CPT | Mod: PBBFAC,,, | Performed by: INTERNAL MEDICINE

## 2023-06-08 PROCEDURE — 99213 OFFICE O/P EST LOW 20 MIN: CPT | Mod: PBBFAC | Performed by: INTERNAL MEDICINE

## 2023-06-08 PROCEDURE — 93010 EKG 12-LEAD: ICD-10-PCS | Mod: ,,, | Performed by: INTERNAL MEDICINE

## 2023-06-08 PROCEDURE — 99215 PR OFFICE/OUTPT VISIT, EST, LEVL V, 40-54 MIN: ICD-10-PCS | Mod: 25,S$PBB,, | Performed by: INTERNAL MEDICINE

## 2023-06-08 PROCEDURE — 99215 OFFICE O/P EST HI 40 MIN: CPT | Mod: 25,S$PBB,, | Performed by: INTERNAL MEDICINE

## 2023-06-08 RX ORDER — CARVEDILOL 6.25 MG/1
6.25 TABLET ORAL 2 TIMES DAILY
Qty: 180 TABLET | Refills: 3 | Status: SHIPPED | OUTPATIENT
Start: 2023-06-08 | End: 2023-07-07 | Stop reason: SDUPTHER

## 2023-06-08 NOTE — PATIENT INSTRUCTIONS
Procedure: Coronary Angiogram  Procedure date:  6/13/2023  Procedure time: 8  a.m.    Arrive at the Outpatient Surgery Unit ([facility]) at 6 a.m..  Nothing to eat or drink after midnight.  Take all morning medication with a sip of water.  If you are diabetic, do not take any diabetes medication the morning of the procedure. HOLD Metformin day before the procedure and the morning of the procedure.  Please make arrangements for a family member or friend to bring you home after the procedure. You will not be able to drive home.        If you have any questions, please call our office at [phone].

## 2023-06-08 NOTE — H&P (VIEW-ONLY)
Subjective:     Calderon Burt Jr. is a 72 y.o. male with hypertension, hypercholesterolemia, hypertriglyceridemia and diabetes mellitus type 2. He is overweight. In 12/2019 he suffered a thalamic cerebrovascular accident . He presented with weakness in the left arm. In 4/2021 he began to experience a left sided chest discomfort. The pain came on when he was moving furniture at home. He was noted to have a left bundle branch block. On 6/14/2021 he had an echocardiogram that revealed normal left ventricular size with low normal systolic function with an ejection fraction of 55%. The septum contracted as with left bundle branch block. There was mild diastolic dysfunction. On 6/14/2021 he had a regadenoson MPI that revealed normal perfusion. The ejection fraction was 63%. Accordingly there was nothing to suggest obstructive coronary artery disease. It appeared the chest pain most certainly was non cardiac in origin. He has not had any chest pain since. On 6/2/2023 he had an echocardiogram that revealed normal left ventricular size with moderate to severe systolic dysfunction. There was dyssynchrony as with left bundle branch block. The ejection fraction was 30%. There was mild mitral regurgitation. No exertional shortness of breath. No palpitations or weak spells. No issues with any of his prescribed medications. Feeling well.          Chest Pain   This is a chronic problem. The current episode started more than 1 year ago. The problem has been resolved. Pertinent negatives include no abdominal pain, back pain, claudication, cough, diaphoresis, dizziness, exertional chest pressure, fever, headaches, hemoptysis, irregular heartbeat, leg pain, lower extremity edema, malaise/fatigue, nausea, near-syncope, numbness, orthopnea, palpitations, PND, shortness of breath, sputum production, syncope, vomiting or weakness.   His past medical history is significant for CHF, diabetes and hyperlipidemia.   Pertinent negatives for past  medical history include no muscle weakness.   Cerebrovascular Accident  This is a chronic problem. Pertinent negatives include no abdominal pain, anorexia, arthralgias, change in bowel habit, chest pain, chills, congestion, coughing, diaphoresis, fatigue, fever, headaches, joint swelling, myalgias, nausea, neck pain, numbness, rash, sore throat, swollen glands, urinary symptoms, vertigo, visual change, vomiting or weakness.   Hypertension  This is a chronic problem. The current episode started more than 1 year ago. The problem is controlled (usually 125-130/75-80 mmHg at home). Pertinent negatives include no anxiety, blurred vision, chest pain, headaches, malaise/fatigue, neck pain, orthopnea, palpitations, peripheral edema, PND, shortness of breath or sweats. There is no history of chronic renal disease.   Hyperlipidemia  This is a chronic problem. The current episode started more than 1 year ago. Recent lipid tests were reviewed and are normal. Exacerbating diseases include diabetes. He has no history of chronic renal disease, hypothyroidism, liver disease, obesity or nephrotic syndrome. Pertinent negatives include no chest pain, focal sensory loss, focal weakness, leg pain, myalgias or shortness of breath.   Congestive Heart Failure  Pertinent negatives include no abdominal pain, chest pain, claudication, fatigue, muscle weakness, near-syncope, palpitations or shortness of breath.     Review of Systems   Constitutional: Negative for chills, diaphoresis, fatigue, fever and malaise/fatigue.   HENT:  Negative for congestion, nosebleeds and sore throat.    Eyes:  Negative for blurred vision, double vision, vision loss in left eye and vision loss in right eye.   Cardiovascular:  Negative for chest pain, claudication, dyspnea on exertion, irregular heartbeat, leg swelling, near-syncope, orthopnea, palpitations, paroxysmal nocturnal dyspnea and syncope.   Respiratory:  Negative for cough, hemoptysis, shortness of  breath, sputum production and wheezing.    Endocrine: Negative for cold intolerance and heat intolerance.   Hematologic/Lymphatic: Negative for bleeding problem. Does not bruise/bleed easily.   Skin:  Negative for color change and rash.   Musculoskeletal:  Negative for arthralgias, back pain, falls, joint swelling, muscle weakness, myalgias and neck pain.   Gastrointestinal:  Negative for abdominal pain, anorexia, change in bowel habit, heartburn, hematemesis, hematochezia, hemorrhoids, jaundice, melena, nausea and vomiting.   Genitourinary:  Negative for dysuria and hematuria.   Neurological:  Negative for dizziness, focal weakness, headaches, light-headedness, loss of balance, numbness, tremors, vertigo and weakness.   Psychiatric/Behavioral:  Negative for altered mental status, depression and memory loss. The patient is not nervous/anxious.    Allergic/Immunologic: Negative for hives and persistent infections.       Current Outpatient Medications on File Prior to Visit   Medication Sig Dispense Refill    amLODIPine (NORVASC) 10 MG tablet Take 1 tablet (10 mg total) by mouth once daily. 90 tablet 3    aspirin (ECOTRIN) 81 MG EC tablet Take 1 tablet (81 mg total) by mouth once daily. 90 tablet 3    benazepriL (LOTENSIN) 20 MG tablet Take 1 tablet (20 mg total) by mouth once daily. 90 tablet 3    bimatoprost (LUMIGAN) 0.01 % Drop 1 drop every evening.      brimonidine-timoloL (COMBIGAN) 0.2-0.5 % Drop Place 1 drop into both eyes.      metFORMIN (GLUMETZA) 1000 MG (MOD) 24 hr tablet Take 1,000 mg by mouth 2 (two) times daily with meals.      psyllium (METAMUCIL) powder Take 1 packet by mouth once daily. Pt takes about twice a week      tamsulosin (FLOMAX) 0.4 mg Cap Take 0.4 mg by mouth once daily.      temazepam (RESTORIL) 7.5 MG Cap Take 15 mg by mouth nightly as needed.      nirmatrelvir-ritonavir (PAXLOVID, EUA,) 300 mg (150 mg x 2)-100 mg copackaged tablets (EUA) Take 3 tablets by mouth 2 (two) times daily. Each  dose contains 2 nirmatrelvir (pink tablets) and 1 ritonavir (white tablet). Take all 3 tablets together (Patient not taking: Reported on 6/8/2023) 30 tablet 0     No current facility-administered medications on file prior to visit.       BP (!) 148/83 (BP Location: Right arm, Patient Position: Sitting, BP Method: Medium (Manual))   Pulse 74   Ht 6' (1.829 m)   Wt 87.1 kg (192 lb 2.1 oz)   SpO2 99%   BMI 26.06 kg/m²       Objective:     Physical Exam  Constitutional:       General: He is not in acute distress.     Appearance: Normal appearance. He is well-developed. He is not toxic-appearing or diaphoretic.   HENT:      Head: Normocephalic and atraumatic.      Nose: Nose normal.   Eyes:      General:         Right eye: No discharge.         Left eye: No discharge.      Conjunctiva/sclera:      Right eye: Right conjunctiva is not injected.      Left eye: Left conjunctiva is not injected.      Pupils: Pupils are equal.      Right eye: Pupil is round.      Left eye: Pupil is round.   Neck:      Thyroid: No thyromegaly.      Vascular: No carotid bruit or JVD.   Cardiovascular:      Rate and Rhythm: Normal rate and regular rhythm. No extrasystoles are present.     Chest Wall: PMI is not displaced.      Pulses:           Radial pulses are 2+ on the right side and 2+ on the left side.        Femoral pulses are 2+ on the right side and 2+ on the left side.       Dorsalis pedis pulses are 2+ on the right side and 2+ on the left side.        Posterior tibial pulses are 2+ on the right side and 2+ on the left side.      Heart sounds: S1 normal and S2 normal. No murmur heard.    Gallop present. S4 sounds present.   Pulmonary:      Effort: Pulmonary effort is normal.      Breath sounds: Normal breath sounds.   Chest:      Chest wall: No swelling or tenderness.   Abdominal:      Palpations: Abdomen is soft.      Tenderness: There is no abdominal tenderness.   Musculoskeletal:      Cervical back: Neck supple.      Right ankle:  No swelling, deformity or ecchymosis.      Left ankle: No swelling, deformity or ecchymosis.   Lymphadenopathy:      Head:      Right side of head: No submandibular adenopathy.      Left side of head: No submandibular adenopathy.      Cervical: No cervical adenopathy.   Skin:     General: Skin is warm and dry.      Findings: No rash.      Nails: There is no clubbing.      Comments: Mass consistent with lipoma right upper back.   Neurological:      General: No focal deficit present.      Mental Status: He is alert and oriented to person, place, and time. He is not disoriented.      Cranial Nerves: No cranial nerve deficit.   Psychiatric:         Attention and Perception: Attention and perception normal.         Mood and Affect: Mood and affect normal.         Speech: Speech normal.         Behavior: Behavior normal.         Thought Content: Thought content normal.         Cognition and Memory: Cognition and memory normal.         Judgment: Judgment normal.     Assessment:     1. Heart failure, systolic, chronic    2. Left bundle branch block    3. History of chest pain    4. History of cerebrovascular accident    5. Primary hypertension    6. Hypercholesterolemia    7. Hypertriglyceridemia    8. Type 2 diabetes mellitus with other circulatory complication, without long-term current use of insulin        Plan:     Heart Failure, Systolic, Chronic   6/2/2023: Echo: Normal left ventricular size with moderate to severe systolic dysfunction. LBBB. EF 30%. Mild MR.  On benazepril 20 mg Q24.  6/8/2023: Carvedilol 6.25 mg Q12 and empagliflozin 10 mg Q24 to begin. In 2 weeks do BMP and BNP.    Set up cath.  Possible CRT long term but QRS quite narrow.  12/2023: Plan next Echo.    2. Left Bundle Branch Block   2021: Diagnosed.   6/8/2023: SR. LBBB 134 ms.     3. History of Chest Pain   4/2021: Began experience chest pain.   12/17/2019: Echo: Normal left ventricular size and systolic function.   6/14/2021: Echo: Normal left  ventricular size with low normal systolic function. EF 55%. LBBB. Mild diastolic dysfunction.    6/14/2021: Regadenoson MPI: Normal perfusion. EF 63%.   Nothing to suggest obstructive CAD. Chest pain was most certainly non-cardiac in origin.   Chest pain appeared atypical.   Mostly resolved.     4. History of Cerebrovascular Accident   12/2019: Thalamic CVA. Left arm weakness.   All risk factors are addressed.   On aspirin 81 mg Q24.     5. Hypertension   2016: Diagnosed.    7/12/2021: Amlodipine 10 mg Q24 was begun as running high.    On amlodipine 10 mg Q24 and benazepril 20 mg Q24.   Keeping log at home.   Well controlled.   As above.   Plan to discontinue amlodipine as pressure comes down.    6. Hypercholesterolemia   2016: Began statin.    On atorvastatin 40 mg Q24.   12/16/2019: Chol 153. HDL 46. LDL 62. .   8/26/2022: Chol 139. HDL 54. LDL 66. .   On atorvastatin 40 mg Q24.   Favorable lipid panel.    7. Hypertriglyceridemia   2016: Diagnosed.   As above.    8. Diabetes Mellitus, Type 2   2018: Diagnosed. Complications: CVA. Medications: Oral agent.    On metformin 500 mg Q12.   Tells me well controlled.    9. Overweight   6/3/2021: Weight 86 kg. BMI 26.     10. Primary Care   Dr. Jigar Durant.    The planned procedure was discussed in detail with the patient and the family members present. Risk, benefit and alternatives were reviewed. All questions answered. Consent was then signed.    If further questions or concerns arise the patient was encouraged to contact me prior to the planned procedure.     F/u 6 weeks.    Kevin Ham M.D.

## 2023-06-09 ENCOUNTER — TELEPHONE (OUTPATIENT)
Dept: CARDIOLOGY | Facility: CLINIC | Age: 73
End: 2023-06-09
Payer: MEDICARE

## 2023-06-09 NOTE — TELEPHONE ENCOUNTER
Patient advised to hold Jardiance the day before the anigogram and the morning of the angiogram.    Patient states he hasn't picked the Jardiance yet, the pharmacy had to order it.    Patient advised to get labs drawn 2 wks after starting Jardiance.      ----- Message from [employer] sent at 6/9/2023  9:13 AM CDT -----  Name of Who is Calling:HAL READ JR. [[id number]]              What is the request in detail:Requesting a call back. Please assist.               Can the clinic reply by [facility]:no              [facility][phone]

## 2023-06-12 ENCOUNTER — TELEPHONE (OUTPATIENT)
Dept: CARDIOLOGY | Facility: CLINIC | Age: 73
End: 2023-06-12
Payer: MEDICARE

## 2023-06-12 ENCOUNTER — HOSPITAL ENCOUNTER (OUTPATIENT)
Dept: PREADMISSION TESTING | Facility: OTHER | Age: 73
Discharge: HOME | End: 2023-06-12
Attending: INTERNAL MEDICINE
Payer: MEDICARE

## 2023-06-12 VITALS
RESPIRATION RATE: 16 BRPM | BODY MASS INDEX: 26.01 KG/M2 | SYSTOLIC BLOOD PRESSURE: 130 MMHG | TEMPERATURE: 98 F | OXYGEN SATURATION: 98 % | WEIGHT: 192 LBS | DIASTOLIC BLOOD PRESSURE: 74 MMHG | HEART RATE: 70 BPM | HEIGHT: 72 IN

## 2023-06-12 LAB
ANION GAP SERPL CALC-SCNC: 7 MMOL/L (ref 8–16)
BUN SERPL-MCNC: 12 MG/DL (ref 8–23)
CALCIUM SERPL-MCNC: 9.2 MG/DL (ref 8.7–10.5)
CHLORIDE SERPL-SCNC: 106 MMOL/L (ref 95–110)
CO2 SERPL-SCNC: 27 MMOL/L (ref 23–29)
CREAT SERPL-MCNC: 1 MG/DL (ref 0.5–1.4)
ERYTHROCYTE [DISTWIDTH] IN BLOOD BY AUTOMATED COUNT: 12.6 % (ref 11.5–14.5)
EST. GFR  (NO RACE VARIABLE): >60 ML/MIN/1.73 M^2
GLUCOSE SERPL-MCNC: 129 MG/DL (ref 70–110)
HCT VFR BLD AUTO: 39.3 % (ref 40–54)
HGB BLD-MCNC: 13.1 G/DL (ref 14–18)
MCH RBC QN AUTO: 30.5 PG (ref 27–31)
MCHC RBC AUTO-ENTMCNC: 33.3 G/DL (ref 32–36)
MCV RBC AUTO: 92 FL (ref 82–98)
PLATELET # BLD AUTO: 183 K/UL (ref 150–450)
PMV BLD AUTO: 10 FL (ref 9.2–12.9)
POTASSIUM SERPL-SCNC: 4.2 MMOL/L (ref 3.5–5.1)
RBC # BLD AUTO: 4.29 M/UL (ref 4.6–6.2)
SODIUM SERPL-SCNC: 140 MMOL/L (ref 136–145)
WBC # BLD AUTO: 4.93 K/UL (ref 3.9–12.7)

## 2023-06-12 PROCEDURE — 80048 BASIC METABOLIC PNL TOTAL CA: CPT | Performed by: INTERNAL MEDICINE

## 2023-06-12 PROCEDURE — 36415 COLL VENOUS BLD VENIPUNCTURE: CPT | Performed by: INTERNAL MEDICINE

## 2023-06-12 PROCEDURE — 85027 COMPLETE CBC AUTOMATED: CPT | Performed by: INTERNAL MEDICINE

## 2023-06-12 NOTE — TELEPHONE ENCOUNTER
Pt notified and verbalized understanding.      ----- Message from Kevin Ham MD sent at 6/12/2023  3:27 PM CDT -----  Laboratory data reviewed. All results acceptable.     Please call the patient and inform the patient about results.     Kevin Ham M.D.

## 2023-06-13 ENCOUNTER — HOSPITAL ENCOUNTER (OUTPATIENT)
Facility: OTHER | Age: 73
Discharge: HOME | End: 2023-06-13
Attending: INTERNAL MEDICINE | Admitting: INTERNAL MEDICINE
Payer: MEDICARE

## 2023-06-13 VITALS
HEART RATE: 64 BPM | RESPIRATION RATE: 16 BRPM | OXYGEN SATURATION: 98 % | SYSTOLIC BLOOD PRESSURE: 123 MMHG | TEMPERATURE: 98 F | DIASTOLIC BLOOD PRESSURE: 74 MMHG

## 2023-06-13 DIAGNOSIS — E11.59 TYPE 2 DIABETES MELLITUS WITH OTHER CIRCULATORY COMPLICATION, WITHOUT LONG-TERM CURRENT USE OF INSULIN: Primary | ICD-10-CM

## 2023-06-13 DIAGNOSIS — I25.10 CORONARY ARTERY DISEASE INVOLVING NATIVE CORONARY ARTERY OF NATIVE HEART WITHOUT ANGINA PECTORIS: ICD-10-CM

## 2023-06-13 DIAGNOSIS — I25.10 CORONARY ARTERY DISEASE: ICD-10-CM

## 2023-06-13 LAB — POCT GLUCOSE: 142 MG/DL (ref 70–110)

## 2023-06-13 PROCEDURE — 93458 L HRT ARTERY/VENTRICLE ANGIO: CPT | Performed by: INTERNAL MEDICINE

## 2023-06-13 PROCEDURE — C1887 CATHETER, GUIDING: HCPCS | Performed by: INTERNAL MEDICINE

## 2023-06-13 PROCEDURE — 99153 MOD SED SAME PHYS/QHP EA: CPT | Performed by: INTERNAL MEDICINE

## 2023-06-13 PROCEDURE — 93458 L HRT ARTERY/VENTRICLE ANGIO: CPT | Mod: 26,,, | Performed by: INTERNAL MEDICINE

## 2023-06-13 PROCEDURE — 63600175 PHARM REV CODE 636 W HCPCS: Performed by: INTERNAL MEDICINE

## 2023-06-13 PROCEDURE — 93458 PR CATH PLACE/CORON ANGIO, IMG SUPER/INTERP,W LEFT HEART VENTRICULOGRAPHY: ICD-10-PCS | Mod: 26,,, | Performed by: INTERNAL MEDICINE

## 2023-06-13 PROCEDURE — 99152 MOD SED SAME PHYS/QHP 5/>YRS: CPT | Mod: ,,, | Performed by: INTERNAL MEDICINE

## 2023-06-13 PROCEDURE — 99024 PR POST-OP FOLLOW-UP VISIT: ICD-10-PCS | Mod: ,,, | Performed by: INTERNAL MEDICINE

## 2023-06-13 PROCEDURE — 99024 POSTOP FOLLOW-UP VISIT: CPT | Mod: ,,, | Performed by: INTERNAL MEDICINE

## 2023-06-13 PROCEDURE — C1894 INTRO/SHEATH, NON-LASER: HCPCS | Performed by: INTERNAL MEDICINE

## 2023-06-13 PROCEDURE — 99152 MOD SED SAME PHYS/QHP 5/>YRS: CPT | Performed by: INTERNAL MEDICINE

## 2023-06-13 PROCEDURE — C1769 GUIDE WIRE: HCPCS | Performed by: INTERNAL MEDICINE

## 2023-06-13 PROCEDURE — 25000003 PHARM REV CODE 250: Performed by: INTERNAL MEDICINE

## 2023-06-13 PROCEDURE — 99152 PR MOD CONSCIOUS SEDATION, SAME PHYS, 5+ YRS, FIRST 15 MIN: ICD-10-PCS | Mod: ,,, | Performed by: INTERNAL MEDICINE

## 2023-06-13 RX ORDER — SODIUM CHLORIDE 9 MG/ML
INJECTION, SOLUTION INTRAVENOUS CONTINUOUS
Status: DISCONTINUED | OUTPATIENT
Start: 2023-06-13 | End: 2023-06-13 | Stop reason: HOSPADM

## 2023-06-13 RX ORDER — ATORVASTATIN CALCIUM 40 MG/1
40 TABLET, FILM COATED ORAL DAILY
Qty: 90 TABLET | Refills: 3 | Status: SHIPPED | OUTPATIENT
Start: 2023-06-13 | End: 2023-07-07 | Stop reason: SDUPTHER

## 2023-06-13 RX ORDER — SODIUM CHLORIDE 0.9 % (FLUSH) 0.9 %
10 SYRINGE (ML) INJECTION
Status: DISCONTINUED | OUTPATIENT
Start: 2023-06-13 | End: 2023-06-13 | Stop reason: HOSPADM

## 2023-06-13 RX ORDER — ONDANSETRON 8 MG/1
8 TABLET, ORALLY DISINTEGRATING ORAL EVERY 8 HOURS PRN
Status: DISCONTINUED | OUTPATIENT
Start: 2023-06-13 | End: 2023-06-13 | Stop reason: HOSPADM

## 2023-06-13 RX ORDER — ACETAMINOPHEN 325 MG/1
650 TABLET ORAL EVERY 4 HOURS PRN
Status: DISCONTINUED | OUTPATIENT
Start: 2023-06-13 | End: 2023-06-13 | Stop reason: HOSPADM

## 2023-06-13 RX ORDER — FENTANYL CITRATE 50 UG/ML
INJECTION, SOLUTION INTRAMUSCULAR; INTRAVENOUS
Status: DISCONTINUED | OUTPATIENT
Start: 2023-06-13 | End: 2023-06-13 | Stop reason: HOSPADM

## 2023-06-13 RX ORDER — MIDAZOLAM HYDROCHLORIDE 1 MG/ML
INJECTION INTRAMUSCULAR; INTRAVENOUS
Status: DISCONTINUED | OUTPATIENT
Start: 2023-06-13 | End: 2023-06-13 | Stop reason: HOSPADM

## 2023-06-13 RX ORDER — LIDOCAINE HYDROCHLORIDE 10 MG/ML
INJECTION, SOLUTION EPIDURAL; INFILTRATION; INTRACAUDAL; PERINEURAL
Status: DISCONTINUED | OUTPATIENT
Start: 2023-06-13 | End: 2023-06-13 | Stop reason: HOSPADM

## 2023-06-13 RX ORDER — NITROGLYCERIN 5 MG/ML
INJECTION, SOLUTION INTRAVENOUS
Status: DISCONTINUED | OUTPATIENT
Start: 2023-06-13 | End: 2023-06-13 | Stop reason: HOSPADM

## 2023-06-13 RX ORDER — HEPARIN SODIUM 1000 [USP'U]/ML
INJECTION, SOLUTION INTRAVENOUS; SUBCUTANEOUS
Status: DISCONTINUED | OUTPATIENT
Start: 2023-06-13 | End: 2023-06-13 | Stop reason: HOSPADM

## 2023-06-13 RX ORDER — DIPHENHYDRAMINE HCL 25 MG
25 CAPSULE ORAL
Status: DISCONTINUED | OUTPATIENT
Start: 2023-06-13 | End: 2023-06-13 | Stop reason: HOSPADM

## 2023-06-13 RX ORDER — ASPIRIN 81 MG/1
81 TABLET ORAL DAILY
Status: DISCONTINUED | OUTPATIENT
Start: 2023-06-14 | End: 2023-06-13 | Stop reason: HOSPADM

## 2023-06-13 RX ADMIN — SODIUM CHLORIDE: 9 INJECTION, SOLUTION INTRAVENOUS at 06:06

## 2023-06-13 RX ADMIN — DIPHENHYDRAMINE HYDROCHLORIDE 25 MG: 25 CAPSULE ORAL at 06:06

## 2023-06-13 NOTE — BRIEF OP NOTE
6/13/2023: [facility]: Cor: LM: Very short. Moderate calcification. LAD: Osteal 40%. Prox 30%. D1 osteal 70%. LCX: Osteal 30%. RCA: Dominant. Distal 30%.                       Kevin Ham M.D.

## 2023-06-13 NOTE — PLAN OF CARE
Monitor tech called with notification Mr. Burt had 11 beat run v tach; patient asymptomatic, /74.  Dr. Ham notified and will continue to monitor with same plan of care.

## 2023-06-13 NOTE — PLAN OF CARE
TR Band Protocol done to delfate band: 2cc's air aspirated every 5 minutes; 12cc's total aspirated; site remains dry, no swelling noted; tegaderm dressing applied; right wrist immobilized on arm board.  Patient instructed to limit movement of right wrist.

## 2023-06-13 NOTE — PLAN OF CARE
Calderon Burt Jr. has met all discharge criteria from Phase II. Vital Signs are stable, ambulating  without difficulty. Discharge instructions given, patient verbalized understanding. Discharged from facility via wheelchair in stable condition.

## 2023-06-13 NOTE — DISCHARGE SUMMARY
[facility] Cath Lab ([facility]  Cardiology  Discharge Summary      Patient Name: Calderon Burt Jr.  MRN: [id number]  Admission Date: 6/13/2023  Hospital Length of Stay: 0 days  Discharge Date and Time:  06/13/2023 8:47 AM  Attending Physician: Kevin Ham MD  Discharging Provider: Kevin Ham MD  Primary Care Physician: Jigar Durant Iii, MD    Brief HPI:    Calderon Burt Jr. is a 72 y.o. male with hypertension, hypercholesterolemia, hypertriglyceridemia and diabetes mellitus type 2. He is overweight. In 12/2019 he suffered a thalamic cerebrovascular accident . He presented with weakness in the left arm. In 4/2021 he began to experience a left sided chest discomfort. The pain came on when he was moving furniture at home. He was noted to have a left bundle branch block. On 6/14/2021 he had an echocardiogram that revealed normal left ventricular size with low normal systolic function with an ejection fraction of 55%. The septum contracted as with left bundle branch block. There was mild diastolic dysfunction. On 6/14/2021 he had a regadenoson MPI that revealed normal perfusion. The ejection fraction was 63%. Accordingly there was nothing to suggest obstructive coronary artery disease. It appeared the chest pain most certainly was non cardiac in origin. He has not had any chest pain since. On 6/2/2023 he had an echocardiogram that revealed normal left ventricular size with moderate to severe systolic dysfunction. There was dyssynchrony as with left bundle branch block. The ejection fraction was 30%. There was mild mitral regurgitation. No exertional shortness of breath. No palpitations or weak spells. No issues with any of his prescribed medications. Feeling well. With a significant decrease in systolic dysfunction in the setting of several risk factors for vascular disease he is referred for coronary angiography.      Hospital Course:     6/13/2023: [facility]: Cor: LM: Very short. Moderate calcification. LAD: Osteal  40%. Prox 30%. D1 osteal 70%. LCX: Osteal 30%. RCA: Dominant. Distal 30%.    He will be treated medically for his coronary artery disease.     He will be observed over the next several hours and then be discharged home.      Assessment and Plan:    Heart Failure, Systolic, Chronic              6/2/2023: Echo: Normal left ventricular size with moderate to severe systolic dysfunction. LBBB. EF 30%. Mild MR.   6/8/2023: Carvedilol 6.25 mg Q12 and empagliflozin 10 mg Q24 to begin. In 2 weeks do BMP and BNP.   On carvedilol 6.25 mg Q12, empagliflozin 10 mg Q24 and benazepril 20 mg Q24.  Possible CRT long term but QRS quite narrow.  12/2023: Plan next Echo.     2. Left Bundle Branch Block              2021: Diagnosed.              6/8/2023: SR. LBBB 134 ms.     3. Coronary Artery Disease  6/13/2023: [facility]: Cor: LM: Very short. Moderate calcification. LAD: Osteal 40%. Prox 30%. D1 osteal 70%. LCX: Osteal 30%. RCA: Dominant. Distal 30%.   Medical therapy.  On atorvastatin 40 mg Q24.  On aspirin 81 mg Q24.     4. History of Chest Pain              4/2021: Began experience chest pain.              12/17/2019: Echo: Normal left ventricular size and systolic function.              6/14/2021: Echo: Normal left ventricular size with low normal systolic function. EF 55%. LBBB. Mild diastolic dysfunction.               6/14/2021: Regadenoson MPI: Normal perfusion. EF 63%.              Nothing to suggest obstructive CAD. Chest pain was most certainly non-cardiac in origin.              Chest pain appeared atypical.              Mostly resolved.                5. History of Cerebrovascular Accident              12/2019: Thalamic CVA. Left arm weakness.              All risk factors are addressed.              On aspirin 81 mg Q24.                6. Hypertension              2016: Diagnosed.               7/12/2021: Amlodipine 10 mg Q24 was begun as running high.               On carvedilol 6.25 mg Q12, amlodipine 10 mg Q24 and  benazepril 20 mg Q24.              Keeping log at home.              Well controlled.              Plan to discontinue amlodipine as pressure comes down.     7. Hypercholesterolemia              2016: Began statin.               On atorvastatin 40 mg Q24.              12/16/2019: Chol 153. HDL 46. LDL 62. .              8/26/2022: Chol 139. HDL 54. LDL 66. .              On atorvastatin 40 mg Q24.              Favorable lipid panel.     8. Hypertriglyceridemia              2016: Diagnosed.              As above.     9. Diabetes Mellitus, Type 2              2018: Diagnosed. Complications: CVA. Medications: Oral agent.               On metformin 500 mg Q12.              Tells me well controlled.     10. Overweight              6/3/2021: Weight 86 kg. BMI 26.      11. Primary Care              Dr. Jigar Durant.      Procedure(s) (LRB):  ANGIOGRAM, CORONARY ARTERY (N/A)  Left heart cath (Left)     Indwelling Lines/Drains at time of discharge:  Lines/Drains/Airways       None                   Consults: none.     Significant Diagnostic Studies: Labs: BMP:   Recent Labs   Lab 06/12/23  0838   *      K 4.2      CO2 27   BUN 12   CREATININE 1.0   CALCIUM 9.2    and Lipid Panel   Lab Results   Component Value Date    CHOL 153 12/16/2019    HDL 46 12/16/2019    LDLCALC 62.0 (L) 12/16/2019    TRIG 225 (H) 12/16/2019    CHOLHDL 30.1 12/16/2019       Pending Diagnostic Studies:       None            Final Active Diagnoses:    Diagnosis Date Noted POA    PRINCIPAL PROBLEM:  Coronary artery disease [I25.10] 06/13/2023 Yes    Heart failure, systolic, chronic [I50.22] 06/02/2023 Yes    Left bundle branch block [I44.7] 06/03/2021 Yes    History of chest pain [Z87.898] 06/03/2021 Yes    History of cerebrovascular accident [Z86.73] 12/17/2019 Not Applicable    Hypertension [I10] 12/16/2019 Yes    Hypercholesterolemia [E78.00] 12/16/2019 Yes    Hypertriglyceridemia [E78.1] 06/03/2021 Yes    Diabetes  mellitus, type 2 [E11.9] 12/16/2019 Yes    Overweight [E66.3] 06/03/2021 Yes      Problems Resolved During this Admission:       Discharged Condition: good    Follow Up:   Follow-up Information       Kevin Ham MD. Schedule an appointment as soon as possible for a visit in 2 week(s).    Specialties: Cardiology, Interventional Cardiology  Contact information:  [address]  [address]  [facility] [address]  [phone]                           Patient Instructions:      Diet general     Order Specific Question Answer Comments   Na restriction, if any: 2gNa    Additional restrictions: Diabetic 2000      Diet diabetic     No dressing needed     Medications:  Reconciled Home Medications:      Medication List        START taking these medications      atorvastatin 40 MG tablet  Commonly known as: LIPITOR  Take 1 tablet (40 mg total) by mouth once daily.            CONTINUE taking these medications      amLODIPine 10 MG tablet  Commonly known as: NORVASC  Take 1 tablet (10 mg total) by mouth once daily.     benazepriL 20 MG tablet  Commonly known as: LOTENSIN  Take 1 tablet (20 mg total) by mouth once daily.     bimatoprost 0.01 % Drop  Commonly known as: LUMIGAN  1 drop every evening.     brimonidine-timoloL 0.2-0.5 % Drop  Commonly known as: COMBIGAN  Place 1 drop into both eyes.     carvediloL 6.25 MG tablet  Commonly known as: COREG  Take 1 tablet (6.25 mg total) by mouth 2 (two) times daily.     empagliflozin 10 mg tablet  Commonly known as: JARDIANCE  Take 1 tablet (10 mg total) by mouth once daily.     metFORMIN 1000 MG (MOD) 24hr tablet  Commonly known as: GLUMETZA  Take 1,000 mg by mouth 2 (two) times daily with meals.     psyllium powder  Commonly known as: METAMUCIL  Take 1 packet by mouth once daily. Pt takes about twice a week     tamsulosin 0.4 mg Cap  Commonly known as: FLOMAX  Take 0.4 mg by mouth once daily.            ASK your doctor about these medications      aspirin 81 MG EC  tablet  Commonly known as: ECOTRIN  Take 1 tablet (81 mg total) by mouth once daily.              Time spent on the discharge of patient: 50 minutes    Kevin Ham MD  Cardiology  [facility] Lab ([place])

## 2023-06-30 ENCOUNTER — PATIENT MESSAGE (OUTPATIENT)
Dept: RESEARCH | Facility: HOSPITAL | Age: 73
End: 2023-06-30
Payer: MEDICARE

## 2023-06-30 ENCOUNTER — TELEPHONE (OUTPATIENT)
Dept: CARDIOLOGY | Facility: CLINIC | Age: 73
End: 2023-06-30
Payer: MEDICARE

## 2023-06-30 NOTE — TELEPHONE ENCOUNTER
Pt notified and verbalized understanding      ----- Message from Kevin Ham MD sent at 6/29/2023  4:45 PM CDT -----  Flying is safe.    .ok  ----- Message -----  From: Darlyn Rae  Sent: 6/29/2023  10:03 AM CDT  To: Kevin Ham MD      ----- Message -----  From: Michael Frank  Sent: 6/29/2023   8:10 AM CDT  To: Jitendra Brown Staff    Name of Who is Calling: HAL READ JR. [[id number]]           What is the request in detail: Patient is requesting a call back.  Patient needs to know if it is safe to fly in his condition.  Please assist.           Can the clinic reply by [facility]: No           What Number to Call Back if not in [facility]: [phone]

## 2023-07-07 ENCOUNTER — OFFICE VISIT (OUTPATIENT)
Dept: CARDIOLOGY | Facility: CLINIC | Age: 73
End: 2023-07-07
Attending: INTERNAL MEDICINE
Payer: MEDICARE

## 2023-07-07 VITALS
WEIGHT: 189 LBS | HEART RATE: 64 BPM | HEIGHT: 72 IN | BODY MASS INDEX: 25.6 KG/M2 | DIASTOLIC BLOOD PRESSURE: 62 MMHG | SYSTOLIC BLOOD PRESSURE: 108 MMHG | OXYGEN SATURATION: 98 %

## 2023-07-07 DIAGNOSIS — I25.10 CORONARY ARTERY DISEASE INVOLVING NATIVE CORONARY ARTERY OF NATIVE HEART WITHOUT ANGINA PECTORIS: ICD-10-CM

## 2023-07-07 DIAGNOSIS — I10 PRIMARY HYPERTENSION: ICD-10-CM

## 2023-07-07 DIAGNOSIS — I44.7 LEFT BUNDLE BRANCH BLOCK: ICD-10-CM

## 2023-07-07 DIAGNOSIS — E78.00 HYPERCHOLESTEROLEMIA: ICD-10-CM

## 2023-07-07 DIAGNOSIS — Z87.898 HISTORY OF CHEST PAIN: ICD-10-CM

## 2023-07-07 DIAGNOSIS — E78.1 HYPERTRIGLYCERIDEMIA: ICD-10-CM

## 2023-07-07 DIAGNOSIS — E66.3 OVERWEIGHT: ICD-10-CM

## 2023-07-07 DIAGNOSIS — Z86.73 HISTORY OF CEREBROVASCULAR ACCIDENT: ICD-10-CM

## 2023-07-07 DIAGNOSIS — I50.22 HEART FAILURE, SYSTOLIC, CHRONIC: ICD-10-CM

## 2023-07-07 DIAGNOSIS — E11.59 TYPE 2 DIABETES MELLITUS WITH OTHER CIRCULATORY COMPLICATION, WITHOUT LONG-TERM CURRENT USE OF INSULIN: ICD-10-CM

## 2023-07-07 PROCEDURE — 99999 PR PBB SHADOW E&M-EST. PATIENT-LVL III: CPT | Mod: PBBFAC,,, | Performed by: INTERNAL MEDICINE

## 2023-07-07 PROCEDURE — 99213 OFFICE O/P EST LOW 20 MIN: CPT | Mod: PBBFAC | Performed by: INTERNAL MEDICINE

## 2023-07-07 PROCEDURE — 99215 OFFICE O/P EST HI 40 MIN: CPT | Mod: S$PBB,,, | Performed by: INTERNAL MEDICINE

## 2023-07-07 PROCEDURE — 99999 PR PBB SHADOW E&M-EST. PATIENT-LVL III: ICD-10-PCS | Mod: PBBFAC,,, | Performed by: INTERNAL MEDICINE

## 2023-07-07 PROCEDURE — 99215 PR OFFICE/OUTPT VISIT, EST, LEVL V, 40-54 MIN: ICD-10-PCS | Mod: S$PBB,,, | Performed by: INTERNAL MEDICINE

## 2023-07-07 RX ORDER — BENAZEPRIL HYDROCHLORIDE 20 MG/1
20 TABLET ORAL DAILY
Qty: 90 TABLET | Refills: 3 | Status: SHIPPED | OUTPATIENT
Start: 2023-07-07 | End: 2023-10-18 | Stop reason: SDUPTHER

## 2023-07-07 RX ORDER — CARVEDILOL 6.25 MG/1
6.25 TABLET ORAL 2 TIMES DAILY
Qty: 180 TABLET | Refills: 3 | Status: SHIPPED | OUTPATIENT
Start: 2023-07-07 | End: 2023-10-18 | Stop reason: SDUPTHER

## 2023-07-07 RX ORDER — ATORVASTATIN CALCIUM 40 MG/1
40 TABLET, FILM COATED ORAL DAILY
Qty: 90 TABLET | Refills: 3 | Status: SHIPPED | OUTPATIENT
Start: 2023-07-07

## 2023-07-07 RX ORDER — SPIRONOLACTONE 25 MG/1
25 TABLET ORAL DAILY
Qty: 90 TABLET | Refills: 3 | Status: SHIPPED | OUTPATIENT
Start: 2023-07-07

## 2023-07-07 RX ORDER — ASPIRIN 81 MG/1
81 TABLET ORAL DAILY
Qty: 90 TABLET | Refills: 3 | Status: SHIPPED | OUTPATIENT
Start: 2023-07-07

## 2023-07-07 NOTE — PROGRESS NOTES
Subjective:     Calderon Burt Jr. is a 72 y.o. male with hypertension, hypercholesterolemia, hypertriglyceridemia and diabetes mellitus type 2. He is overweight. In 12/2019 he suffered a thalamic cerebrovascular accident . He presented with weakness in the left arm. In 4/2021 he began to experience a left sided chest discomfort. The pain came on when he was moving furniture at home. He was noted to have a left bundle branch block. On 6/14/2021 he had an echocardiogram that revealed normal left ventricular size with low normal systolic function with an ejection fraction of 55%. The septum contracted as with left bundle branch block. There was mild diastolic dysfunction. On 6/14/2021 he had a regadenoson MPI that revealed normal perfusion. The ejection fraction was 63%. Accordingly there was nothing to suggest obstructive coronary artery disease. It appeared the chest pain most certainly was non cardiac in origin. He has not had any chest pain since. On 6/2/2023 he had an echocardiogram that revealed normal left ventricular size with moderate to severe systolic dysfunction. There was dyssynchrony as with left bundle branch block. The ejection fraction was 30%. There was mild mitral regurgitation. With a significant decrease in systolic dysfunction in the setting of several risk factors for vascular disease he was referred for coronary angiography. On 6/13/202 he underwent coronary angiography. The left main was very short with moderate calcification. The left anterior descending coronary artery had an osteal 40% lesion and proximal 30% disease. The first diagonal  branch had osteal 70% disease. The left circumflex coronary artery had osteal 30% disease. The right coronary artery was dominant with distal 30% disease. He will be treated medically for his coronary artery disease. No exertional shortness of breath. No palpitations or weak spells. No issues with any of his prescribed medications. Feeling well.      Chest  Pain   This is a chronic problem. The current episode started more than 1 year ago. The problem has been resolved. Pertinent negatives include no abdominal pain, back pain, claudication, cough, diaphoresis, dizziness, exertional chest pressure, fever, headaches, hemoptysis, irregular heartbeat, leg pain, lower extremity edema, malaise/fatigue, nausea, near-syncope, numbness, orthopnea, palpitations, PND, shortness of breath, sputum production, syncope, vomiting or weakness.   His past medical history is significant for CHF, diabetes and hyperlipidemia.   Pertinent negatives for past medical history include no muscle weakness.   Cerebrovascular Accident  This is a chronic problem. Pertinent negatives include no abdominal pain, anorexia, arthralgias, change in bowel habit, chest pain, chills, congestion, coughing, diaphoresis, fatigue, fever, headaches, joint swelling, myalgias, nausea, neck pain, numbness, rash, sore throat, swollen glands, urinary symptoms, vertigo, visual change, vomiting or weakness.   Hypertension  This is a chronic problem. The current episode started more than 1 year ago. The problem is controlled (usually 125-130/75-80 mmHg at home). Pertinent negatives include no anxiety, blurred vision, chest pain, headaches, malaise/fatigue, neck pain, orthopnea, palpitations, peripheral edema, PND, shortness of breath or sweats. There is no history of chronic renal disease.   Hyperlipidemia  This is a chronic problem. The current episode started more than 1 year ago. Recent lipid tests were reviewed and are normal. Exacerbating diseases include diabetes. He has no history of chronic renal disease, hypothyroidism, liver disease, obesity or nephrotic syndrome. Pertinent negatives include no chest pain, focal sensory loss, focal weakness, leg pain, myalgias or shortness of breath.   Congestive Heart Failure  Pertinent negatives include no abdominal pain, chest pain, claudication, fatigue, muscle weakness,  near-syncope, palpitations or shortness of breath.     Review of Systems   Constitutional: Negative for chills, diaphoresis, fatigue, fever and malaise/fatigue.   HENT:  Negative for congestion, nosebleeds and sore throat.    Eyes:  Negative for blurred vision, double vision, vision loss in left eye and vision loss in right eye.   Cardiovascular:  Negative for chest pain, claudication, dyspnea on exertion, irregular heartbeat, leg swelling, near-syncope, orthopnea, palpitations, paroxysmal nocturnal dyspnea and syncope.   Respiratory:  Negative for cough, hemoptysis, shortness of breath, sputum production and wheezing.    Endocrine: Negative for cold intolerance and heat intolerance.   Hematologic/Lymphatic: Negative for bleeding problem. Does not bruise/bleed easily.   Skin:  Negative for color change and rash.   Musculoskeletal:  Negative for arthralgias, back pain, falls, joint swelling, muscle weakness, myalgias and neck pain.   Gastrointestinal:  Negative for abdominal pain, anorexia, change in bowel habit, heartburn, hematemesis, hematochezia, hemorrhoids, jaundice, melena, nausea and vomiting.   Genitourinary:  Negative for dysuria and hematuria.   Neurological:  Negative for dizziness, focal weakness, headaches, light-headedness, loss of balance, numbness, tremors, vertigo and weakness.   Psychiatric/Behavioral:  Negative for altered mental status, depression and memory loss. The patient is not nervous/anxious.    Allergic/Immunologic: Negative for hives and persistent infections.       Current Outpatient Medications on File Prior to Visit   Medication Sig Dispense Refill    amLODIPine (NORVASC) 10 MG tablet Take 1 tablet (10 mg total) by mouth once daily. 90 tablet 3    aspirin (ECOTRIN) 81 MG EC tablet Take 1 tablet (81 mg total) by mouth once daily. 90 tablet 3    atorvastatin (LIPITOR) 40 MG tablet Take 1 tablet (40 mg total) by mouth once daily. 90 tablet 3    benazepriL (LOTENSIN) 20 MG tablet Take 1  tablet (20 mg total) by mouth once daily. 90 tablet 3    bimatoprost (LUMIGAN) 0.01 % Drop 1 drop every evening.      brimonidine-timoloL (COMBIGAN) 0.2-0.5 % Drop Place 1 drop into both eyes.      carvediloL (COREG) 6.25 MG tablet Take 1 tablet (6.25 mg total) by mouth 2 (two) times daily. 180 tablet 3    empagliflozin (JARDIANCE) 10 mg tablet Take 1 tablet (10 mg total) by mouth once daily. 30 tablet 11    metFORMIN (GLUMETZA) 1000 MG (MOD) 24 hr tablet Take 1,000 mg by mouth 2 (two) times daily with meals.      psyllium (METAMUCIL) powder Take 1 packet by mouth once daily. Pt takes about twice a week      tamsulosin (FLOMAX) 0.4 mg Cap Take 0.4 mg by mouth once daily.       No current facility-administered medications on file prior to visit.       /62 (BP Location: Left arm, Patient Position: Sitting, BP Method: Large (Manual))   Pulse 64   Ht 6' (1.829 m)   Wt 85.7 kg (189 lb)   SpO2 98%   BMI 25.63 kg/m²       Objective:     Physical Exam  Constitutional:       General: He is not in acute distress.     Appearance: Normal appearance. He is well-developed. He is not toxic-appearing or diaphoretic.   HENT:      Head: Normocephalic and atraumatic.      Nose: Nose normal.   Eyes:      General:         Right eye: No discharge.         Left eye: No discharge.      Conjunctiva/sclera:      Right eye: Right conjunctiva is not injected.      Left eye: Left conjunctiva is not injected.      Pupils: Pupils are equal.      Right eye: Pupil is round.      Left eye: Pupil is round.   Neck:      Thyroid: No thyromegaly.      Vascular: No carotid bruit or JVD.   Cardiovascular:      Rate and Rhythm: Normal rate and regular rhythm. No extrasystoles are present.     Chest Wall: PMI is not displaced.      Pulses:           Radial pulses are 2+ on the right side and 2+ on the left side.        Femoral pulses are 2+ on the right side and 2+ on the left side.       Dorsalis pedis pulses are 2+ on the right side and 2+ on  the left side.        Posterior tibial pulses are 2+ on the right side and 2+ on the left side.      Heart sounds: S1 normal and S2 normal. No murmur heard.    Gallop present. S4 sounds present.   Pulmonary:      Effort: Pulmonary effort is normal.      Breath sounds: Normal breath sounds.   Chest:      Chest wall: No swelling or tenderness.   Abdominal:      Palpations: Abdomen is soft.      Tenderness: There is no abdominal tenderness.   Musculoskeletal:      Cervical back: Neck supple.      Right ankle: No swelling, deformity or ecchymosis.      Left ankle: No swelling, deformity or ecchymosis.   Lymphadenopathy:      Head:      Right side of head: No submandibular adenopathy.      Left side of head: No submandibular adenopathy.      Cervical: No cervical adenopathy.   Skin:     General: Skin is warm and dry.      Findings: No rash.      Nails: There is no clubbing.      Comments: Mass consistent with lipoma right upper back.   Neurological:      General: No focal deficit present.      Mental Status: He is alert and oriented to person, place, and time. He is not disoriented.      Cranial Nerves: No cranial nerve deficit.   Psychiatric:         Attention and Perception: Attention and perception normal.         Mood and Affect: Mood and affect normal.         Speech: Speech normal.         Behavior: Behavior normal.         Thought Content: Thought content normal.         Cognition and Memory: Cognition and memory normal.         Judgment: Judgment normal.     Assessment:     1. Heart failure, systolic, chronic    2. Left bundle branch block    3. Coronary artery disease involving native coronary artery of native heart without angina pectoris    4. History of chest pain    5. History of cerebrovascular accident    6. Primary hypertension    7. Hypercholesterolemia    8. Hypertriglyceridemia    9. Type 2 diabetes mellitus with other circulatory complication, without long-term current use of insulin    10. Overweight         Plan:        Heart Failure, Systolic, Chronic              6/2/2023: Echo: Normal left ventricular size with moderate to severe systolic dysfunction. LBBB. EF 30%. Mild MR.              6/8/2023: Carvedilol 6.25 mg Q12 and empagliflozin 10 mg Q24 were begun.   On carvedilol 6.25 mg Q12, empagliflozin 10 mg Q24 and benazepril 20 mg Q24.  7/7/2023: Spironolactone 25 mg Q24 to begin. In 2 weeks do BMP and BNP.  Possible CRT long term but QRS quite narrow.  12/2023: Plan next Echo.     2. Left Bundle Branch Block              2021: Diagnosed.              6/8/2023: SR. LBBB 134 ms.      3. Coronary Artery Disease  6/13/2023: [facility]: Cor: LM: Very short. Moderate calcification. LAD: Osteal 40%. Prox 30%. D1 osteal 70%. LCX: Osteal 30%. RCA: Dominant. Distal 30%.   Medical therapy.  On atorvastatin 40 mg Q24.  On aspirin 81 mg Q24.     4. History of Chest Pain              4/2021: Began experience chest pain.              12/17/2019: Echo: Normal left ventricular size and systolic function.              6/14/2021: Echo: Normal left ventricular size with low normal systolic function. EF 55%. LBBB. Mild diastolic dysfunction.               6/14/2021: Regadenoson MPI: Normal perfusion. EF 63%.              Nothing to suggest obstructive CAD. Chest pain was most certainly non-cardiac in origin.              Chest pain appeared atypical.              Mostly resolved.                5. History of Cerebrovascular Accident              12/2019: Thalamic CVA. Left arm weakness.              All risk factors are addressed.              On aspirin 81 mg Q24.                6. Hypertension              2016: Diagnosed.               7/12/2021: Amlodipine 10 mg Q24 was begun as running high.               On carvedilol 6.25 mg Q12, amlodipine 10 mg Q24 and benazepril 20 mg Q24.              Keeping log at home.              Well controlled.   7/7/2023: Spironolactone 25 mg Q24 to begin. Amlodipine 10 mg Q24 to be discontinued.                 7. Hypercholesterolemia              2016: Began statin.               On atorvastatin 40 mg Q24.              12/16/2019: Chol 153. HDL 46. LDL 62. .              8/26/2022: Chol 139. HDL 54. LDL 66. .              On atorvastatin 40 mg Q24.              Favorable lipid panel.     8. Hypertriglyceridemia              2016: Diagnosed.              As above.     9. Diabetes Mellitus, Type 2              2018: Diagnosed. Complications: CVA. Medications: Oral agent.     6/8/2023: Empagliflozin 10 mg Q24 was begun for HF.               On metformin 500 mg Q12 and empagliflozin 10 mg Q24.              Tells me well controlled.     10. Overweight              6/3/2021: Weight 86 kg. BMI 26.      11. Primary Care              Dr. Jigar Durant.    F/u 2 months.    Kevin Ham M.D.

## 2023-07-21 ENCOUNTER — LAB VISIT (OUTPATIENT)
Dept: LAB | Facility: OTHER | Age: 73
End: 2023-07-21
Attending: INTERNAL MEDICINE
Payer: MEDICARE

## 2023-07-21 DIAGNOSIS — I50.22 HEART FAILURE, SYSTOLIC, CHRONIC: ICD-10-CM

## 2023-07-21 LAB
ANION GAP SERPL CALC-SCNC: 7 MMOL/L (ref 8–16)
BNP SERPL-MCNC: <10 PG/ML (ref 0–99)
BUN SERPL-MCNC: 21 MG/DL (ref 8–23)
CALCIUM SERPL-MCNC: 10 MG/DL (ref 8.7–10.5)
CHLORIDE SERPL-SCNC: 108 MMOL/L (ref 95–110)
CO2 SERPL-SCNC: 24 MMOL/L (ref 23–29)
CREAT SERPL-MCNC: 1.5 MG/DL (ref 0.5–1.4)
EST. GFR  (NO RACE VARIABLE): 50 ML/MIN/1.73 M^2
GLUCOSE SERPL-MCNC: 144 MG/DL (ref 70–110)
POTASSIUM SERPL-SCNC: 4.4 MMOL/L (ref 3.5–5.1)
SODIUM SERPL-SCNC: 139 MMOL/L (ref 136–145)

## 2023-07-21 PROCEDURE — 83880 ASSAY OF NATRIURETIC PEPTIDE: CPT | Performed by: INTERNAL MEDICINE

## 2023-07-21 PROCEDURE — 80048 BASIC METABOLIC PNL TOTAL CA: CPT | Performed by: INTERNAL MEDICINE

## 2023-07-21 PROCEDURE — 36415 COLL VENOUS BLD VENIPUNCTURE: CPT | Performed by: INTERNAL MEDICINE

## 2023-08-11 ENCOUNTER — TELEPHONE (OUTPATIENT)
Dept: CARDIOLOGY | Facility: CLINIC | Age: 73
End: 2023-08-11
Payer: MEDICARE

## 2023-08-11 NOTE — TELEPHONE ENCOUNTER
Pt notified and verbalized understanding.          Pt c/o weakness after meds. He believes his BP is too low, BP this morning before meds 143/87. Pt states right now before meds he feels energized, feels good.      New medicines Carvedilol and Jardiance.    BP last night about 11 pm 111/67, 109/68,  138/82, 112/70.    Please advise.          ----- Message from Kirti Melton sent at 8/11/2023  8:25 AM CDT -----  Regarding: concerns  Name of Who is Calling:            What is the request in detail: Patient is requesting a call back in regards to the new medication has was put on. He stated that he is getting weaker and sometimes dizzy. Please call before he takes his medication.            Can the clinic reply by [facility]: No           What Number to Call Back if not in [facility]: [phone]

## 2023-08-21 ENCOUNTER — TELEPHONE (OUTPATIENT)
Dept: CARDIOLOGY | Facility: CLINIC | Age: 73
End: 2023-08-21
Payer: MEDICARE

## 2023-08-21 NOTE — TELEPHONE ENCOUNTER
Pt advised to cut benazepril in half.        ----- Message from Froilan Stewart sent at 8/21/2023  8:22 AM CDT -----  Regarding: Concern And Questions  Name of Who is Calling:  Patient          What is the request in detail: Patient stated he spoke with Dr. Ham about cutting his Rx in half, Patient is not sure which one, please give patient a call            Can the clinic reply by [facility]: No            What Number to Call Back if not in [facility]:[phone]

## 2023-10-18 ENCOUNTER — OFFICE VISIT (OUTPATIENT)
Dept: CARDIOLOGY | Facility: CLINIC | Age: 73
End: 2023-10-18
Attending: INTERNAL MEDICINE
Payer: MEDICARE

## 2023-10-18 VITALS
HEART RATE: 81 BPM | OXYGEN SATURATION: 98 % | HEIGHT: 72 IN | BODY MASS INDEX: 24.61 KG/M2 | DIASTOLIC BLOOD PRESSURE: 54 MMHG | SYSTOLIC BLOOD PRESSURE: 84 MMHG | WEIGHT: 181.69 LBS

## 2023-10-18 DIAGNOSIS — E78.00 HYPERCHOLESTEROLEMIA: ICD-10-CM

## 2023-10-18 DIAGNOSIS — I10 PRIMARY HYPERTENSION: ICD-10-CM

## 2023-10-18 DIAGNOSIS — E66.3 OVERWEIGHT: ICD-10-CM

## 2023-10-18 DIAGNOSIS — I50.22 HEART FAILURE, SYSTOLIC, CHRONIC: ICD-10-CM

## 2023-10-18 DIAGNOSIS — Z87.898 HISTORY OF CHEST PAIN: ICD-10-CM

## 2023-10-18 DIAGNOSIS — E78.1 HYPERTRIGLYCERIDEMIA: ICD-10-CM

## 2023-10-18 DIAGNOSIS — I25.10 CORONARY ARTERY DISEASE INVOLVING NATIVE CORONARY ARTERY OF NATIVE HEART WITHOUT ANGINA PECTORIS: ICD-10-CM

## 2023-10-18 DIAGNOSIS — I44.7 LEFT BUNDLE BRANCH BLOCK: ICD-10-CM

## 2023-10-18 DIAGNOSIS — Z86.73 HISTORY OF CEREBROVASCULAR ACCIDENT: ICD-10-CM

## 2023-10-18 PROCEDURE — 99999 PR PBB SHADOW E&M-EST. PATIENT-LVL III: ICD-10-PCS | Mod: PBBFAC,,, | Performed by: INTERNAL MEDICINE

## 2023-10-18 PROCEDURE — 99214 PR OFFICE/OUTPT VISIT, EST, LEVL IV, 30-39 MIN: ICD-10-PCS | Mod: S$PBB,,, | Performed by: INTERNAL MEDICINE

## 2023-10-18 PROCEDURE — 99213 OFFICE O/P EST LOW 20 MIN: CPT | Mod: PBBFAC | Performed by: INTERNAL MEDICINE

## 2023-10-18 PROCEDURE — 99999 PR PBB SHADOW E&M-EST. PATIENT-LVL III: CPT | Mod: PBBFAC,,, | Performed by: INTERNAL MEDICINE

## 2023-10-18 PROCEDURE — 99214 OFFICE O/P EST MOD 30 MIN: CPT | Mod: S$PBB,,, | Performed by: INTERNAL MEDICINE

## 2023-10-18 RX ORDER — CARVEDILOL 3.12 MG/1
3.12 TABLET ORAL 2 TIMES DAILY
Qty: 180 TABLET | Refills: 3 | Status: SHIPPED | OUTPATIENT
Start: 2023-10-18

## 2023-10-18 RX ORDER — BENAZEPRIL HYDROCHLORIDE 5 MG/1
5 TABLET ORAL DAILY
Qty: 90 TABLET | Refills: 3 | Status: SHIPPED | OUTPATIENT
Start: 2023-10-18

## 2023-10-18 NOTE — PROGRESS NOTES
Subjective:     Calderon Burt Jr. is a 73 y.o. male with hypertension, hypercholesterolemia, hypertriglyceridemia and diabetes mellitus type 2. He has a healthy weight but used to be overweight. In 12/2019 he suffered a thalamic cerebrovascular accident. He presented with weakness in the left arm. In 4/2021 he began to experience a left sided chest discomfort. The pain came on when he was moving furniture at home. He was noted to have a left bundle branch block. On 6/14/2021 he had an echocardiogram that revealed normal left ventricular size with low normal systolic function with an ejection fraction of 55%. The septum contracted as with left bundle branch block. There was mild diastolic dysfunction. On 6/14/2021 he had a regadenoson MPI that revealed normal perfusion. The ejection fraction was 63%. Accordingly, there was nothing to suggest obstructive coronary artery disease. It appeared the chest pain most certainly was non cardiac in origin. He has not had any chest pain since. On 6/2/2023 he had an echocardiogram that revealed normal left ventricular size with moderate to severe systolic dysfunction. There was dyssynchrony as with left bundle branch block. The ejection fraction was 30%. There was mild mitral regurgitation. With a significant decrease in systolic dysfunction in the setting of several risk factors for vascular disease he was referred for coronary angiography. On 6/13/202 he underwent coronary angiography. The left main was very short with moderate calcification. The left anterior descending coronary artery had an osteal 40% lesion and proximal 30% disease. The first diagonal  branch had osteal 70% disease. The left circumflex coronary artery had osteal 30% disease. The right coronary artery was dominant with distal 30% disease. He will be treated medically for his coronary artery disease. No exertional shortness of breath. No palpitations or weak spells. No issues with any of his prescribed  medications. Feeling well.      Chest Pain   This is a chronic problem. The current episode started more than 1 year ago. The problem has been resolved. Pertinent negatives include no abdominal pain, back pain, claudication, cough, diaphoresis, dizziness, exertional chest pressure, fever, headaches, hemoptysis, irregular heartbeat, leg pain, lower extremity edema, malaise/fatigue, nausea, near-syncope, numbness, orthopnea, palpitations, PND, shortness of breath, sputum production, syncope, vomiting or weakness.   His past medical history is significant for CAD, CHF, diabetes and hyperlipidemia.   Pertinent negatives for past medical history include no muscle weakness.   Cerebrovascular Accident  This is a chronic problem. Pertinent negatives include no abdominal pain, anorexia, arthralgias, change in bowel habit, chest pain, chills, congestion, coughing, diaphoresis, fatigue, fever, headaches, joint swelling, myalgias, nausea, neck pain, numbness, rash, sore throat, swollen glands, urinary symptoms, vertigo, visual change, vomiting or weakness.   Hypertension  This is a chronic problem. The current episode started more than 1 year ago. The problem is controlled (usually 125-130/75-80 mmHg at home). Pertinent negatives include no anxiety, blurred vision, chest pain, headaches, malaise/fatigue, neck pain, orthopnea, palpitations, peripheral edema, PND, shortness of breath or sweats. There is no history of chronic renal disease.   Hyperlipidemia  This is a chronic problem. The current episode started more than 1 year ago. Recent lipid tests were reviewed and are normal. Exacerbating diseases include diabetes. He has no history of chronic renal disease, hypothyroidism, liver disease, obesity or nephrotic syndrome. Pertinent negatives include no chest pain, focal sensory loss, focal weakness, leg pain, myalgias or shortness of breath.   Congestive Heart Failure  Presents for follow-up visit. Pertinent negatives include  no abdominal pain, chest pain, chest pressure, claudication, edema, fatigue, muscle weakness, near-syncope, nocturia, orthopnea, palpitations, paroxysmal nocturnal dyspnea or shortness of breath. His past medical history is significant for CAD.   Coronary Artery Disease  Presents for follow-up visit. Pertinent negatives include no chest pain, chest pressure, chest tightness, dizziness, leg swelling, muscle weakness, palpitations, shortness of breath or weight gain. Risk factors include hyperlipidemia. Risk factors do not include obesity. His past medical history is significant for CHF. The symptoms have been stable.       Review of Systems   Constitutional: Negative for chills, diaphoresis, fatigue, fever, malaise/fatigue and weight gain.   HENT:  Negative for congestion, nosebleeds and sore throat.    Eyes:  Negative for blurred vision, double vision, vision loss in left eye and vision loss in right eye.   Cardiovascular:  Negative for chest pain, claudication, dyspnea on exertion, irregular heartbeat, leg swelling, near-syncope, orthopnea, palpitations, paroxysmal nocturnal dyspnea and syncope.   Respiratory:  Negative for chest tightness, cough, hemoptysis, shortness of breath, sputum production and wheezing.    Endocrine: Negative for cold intolerance and heat intolerance.   Hematologic/Lymphatic: Negative for bleeding problem. Does not bruise/bleed easily.   Skin:  Negative for color change and rash.   Musculoskeletal:  Negative for arthralgias, back pain, falls, joint swelling, muscle weakness, myalgias and neck pain.   Gastrointestinal:  Negative for abdominal pain, anorexia, change in bowel habit, heartburn, hematemesis, hematochezia, hemorrhoids, jaundice, melena, nausea and vomiting.   Genitourinary:  Negative for dysuria, hematuria and nocturia.   Neurological:  Negative for dizziness, focal weakness, headaches, light-headedness, loss of balance, numbness, tremors, vertigo and weakness.    Psychiatric/Behavioral:  Negative for altered mental status, depression and memory loss. The patient is not nervous/anxious.    Allergic/Immunologic: Negative for hives and persistent infections.       Current Outpatient Medications on File Prior to Visit   Medication Sig Dispense Refill    aspirin (ECOTRIN) 81 MG EC tablet Take 1 tablet (81 mg total) by mouth once daily. 90 tablet 3    atorvastatin (LIPITOR) 40 MG tablet Take 1 tablet (40 mg total) by mouth once daily. 90 tablet 3    benazepriL (LOTENSIN) 20 MG tablet Take 1 tablet (20 mg total) by mouth once daily. 90 tablet 3    bimatoprost (LUMIGAN) 0.01 % Drop 1 drop every evening.      bimatoprost (LUMIGAN) 0.01 % Drop Instill 1 drop into both eyes every evening 7.5 mL 4    brimonidine-timoloL (COMBIGAN) 0.2-0.5 % Drop Place 1 drop into both eyes.      carvediloL (COREG) 6.25 MG tablet Take 1 tablet (6.25 mg total) by mouth 2 (two) times daily. 180 tablet 3    empagliflozin (JARDIANCE) 10 mg tablet Take 1 tablet (10 mg total) by mouth once daily. 30 tablet 11    metFORMIN (GLUMETZA) 1000 MG (MOD) 24 hr tablet Take 1,000 mg by mouth 2 (two) times daily with meals.      psyllium (METAMUCIL) powder Take 1 packet by mouth once daily. Pt takes about twice a week      spironolactone (ALDACTONE) 25 MG tablet Take 1 tablet (25 mg total) by mouth once daily. 90 tablet 3    tamsulosin (FLOMAX) 0.4 mg Cap Take 0.4 mg by mouth once daily.       No current facility-administered medications on file prior to visit.       BP (!) 84/54   Pulse 81   Ht 6' (1.829 m)   Wt 82.4 kg (181 lb 10.5 oz)   SpO2 98%   BMI 24.64 kg/m²     Objective:     Physical Exam  Constitutional:       General: He is not in acute distress.     Appearance: Normal appearance. He is well-developed. He is not toxic-appearing or diaphoretic.   HENT:      Head: Normocephalic and atraumatic.      Nose: Nose normal.   Eyes:      General:         Right eye: No discharge.         Left eye: No discharge.       Conjunctiva/sclera:      Right eye: Right conjunctiva is not injected.      Left eye: Left conjunctiva is not injected.      Pupils: Pupils are equal.      Right eye: Pupil is round.      Left eye: Pupil is round.   Neck:      Thyroid: No thyromegaly.      Vascular: No carotid bruit or JVD.   Cardiovascular:      Rate and Rhythm: Normal rate and regular rhythm. No extrasystoles are present.     Chest Wall: PMI is not displaced.      Pulses:           Radial pulses are 2+ on the right side and 2+ on the left side.        Femoral pulses are 2+ on the right side and 2+ on the left side.       Dorsalis pedis pulses are 2+ on the right side and 2+ on the left side.        Posterior tibial pulses are 2+ on the right side and 2+ on the left side.      Heart sounds: S1 normal and S2 normal. No murmur heard.     Gallop present. S4 sounds present. No S3 sounds.   Pulmonary:      Effort: Pulmonary effort is normal.      Breath sounds: Normal breath sounds.   Chest:      Chest wall: No swelling or tenderness.   Abdominal:      Palpations: Abdomen is soft.      Tenderness: There is no abdominal tenderness.   Musculoskeletal:      Cervical back: Neck supple.      Right ankle: No swelling, deformity or ecchymosis.      Left ankle: No swelling, deformity or ecchymosis.   Lymphadenopathy:      Head:      Right side of head: No submandibular adenopathy.      Left side of head: No submandibular adenopathy.      Cervical: No cervical adenopathy.   Skin:     General: Skin is warm and dry.      Findings: No rash.      Nails: There is no clubbing.      Comments: Mass consistent with lipoma right upper back.   Neurological:      General: No focal deficit present.      Mental Status: He is alert and oriented to person, place, and time. He is not disoriented.      Cranial Nerves: No cranial nerve deficit.   Psychiatric:         Attention and Perception: Attention and perception normal.         Mood and Affect: Mood and affect normal.          Speech: Speech normal.         Behavior: Behavior normal.         Thought Content: Thought content normal.         Cognition and Memory: Cognition and memory normal.         Judgment: Judgment normal.       Assessment:     1. Heart failure, systolic, chronic    2. Left bundle branch block    3. Coronary artery disease involving native coronary artery of native heart without angina pectoris    4. History of chest pain    5. History of cerebrovascular accident    6. Primary hypertension    7. Hypercholesterolemia    8. Hypertriglyceridemia    9. Overweight        Plan:        Heart Failure, Systolic, Chronic              6/2/2023: Echo: Normal left ventricular size with moderate to severe systolic dysfunction. LBBB. EF 30%. Mild MR.   6/8/2023: Carvedilol 6.25 mg Q12 and empagliflozin 10 mg Q24 were begun.    7/7/2023: Spironolactone 25 mg Q24 was begun.  On carvedilol 6.25 mg Q12, empagliflozin 10 mg Q24, benazepril 20 mg Q24 and spironolactone 25 mg Q24.  10/18/2023: Blood pressure on low side. Carvedilol 6.25 mg Q12 to be reduced to carvedilol 3.125 mg Q12 and benazepril 20 mg Q24 to be reduced to benazepril 5 mg Q24. Keep log at home.  Possible CRT long term but QRS quite narrow.  12/2023: Plan next Echo.     2. Left Bundle Branch Block              2021: Diagnosed.              6/8/2023: SR. LBBB 134 ms.      3. Coronary Artery Disease  6/13/2023: [facility]: Cor: LM: Very short. Moderate calcification. LAD: Osteal 40%. Prox 30%. D1 osteal 70%. LCX: Osteal 30%. RCA: Dominant. Distal 30%.   Medical therapy.  On atorvastatin 40 mg Q24.  On aspirin 81 mg Q24.     4. History of Chest Pain              4/2021: Began experience chest pain.              12/17/2019: Echo: Normal left ventricular size and systolic function.              6/14/2021: Echo: Normal left ventricular size with low normal systolic function. EF 55%. LBBB. Mild diastolic dysfunction.               6/14/2021: Regadenoson MPI: Normal perfusion. EF  63%.              Nothing to suggest obstructive CAD. Chest pain was most certainly non-cardiac in origin.              Chest pain appeared atypical.              Mostly resolved.                5. History of Cerebrovascular Accident              12/2019: Thalamic CVA. Left arm weakness.              All risk factors are addressed.              On aspirin 81 mg Q24.                6. Hypertension              2016: Diagnosed.               7/12/2021: Amlodipine 10 mg Q24 was begun as running high.    7/7/2023: Spironolactone 25 mg Q24 was begun and amlodipine 10 mg Q24 was discontinued.    On carvedilol 6.25 mg Q12, empagliflozin 10 mg Q24, benazepril 20 mg Q24 and spironolactone 25 mg Q24.              No been keeping log at home.              As above.                7. Hypercholesterolemia              2016: Began statin.               On atorvastatin 40 mg Q24.              12/16/2019: Chol 153. HDL 46. LDL 62. .              8/26/2022: Chol 139. HDL 54. LDL 66. .              On atorvastatin 40 mg Q24.              Favorable lipid panel.     8. Hypertriglyceridemia              2016: Diagnosed.              As above.     9. Diabetes Mellitus, Type 2              2018: Diagnosed. Complications: CVA. Medications: Oral agent.     6/8/2023: Empagliflozin 10 mg Q24 was begun for HF.               On metformin 500 mg Q12 and empagliflozin 10 mg Q24.              Tells me well controlled.     10. History of Overweight              6/3/2021: Weight 86 kg. BMI 26.    10/18/2023: Weight 82 kg. BMI 25.      11. Primary Care              Dr. Jigar Durant.    F/u 2 months.    Kevin Ham M.D.

## 2023-11-06 ENCOUNTER — OFFICE VISIT (OUTPATIENT)
Dept: CARDIOLOGY | Facility: CLINIC | Age: 73
End: 2023-11-06
Attending: INTERNAL MEDICINE
Payer: MEDICARE

## 2023-11-06 ENCOUNTER — NURSE TRIAGE (OUTPATIENT)
Dept: ADMINISTRATIVE | Facility: CLINIC | Age: 73
End: 2023-11-06
Payer: MEDICARE

## 2023-11-06 ENCOUNTER — TELEPHONE (OUTPATIENT)
Dept: CARDIOLOGY | Facility: CLINIC | Age: 73
End: 2023-11-06
Payer: MEDICARE

## 2023-11-06 VITALS
HEIGHT: 72 IN | WEIGHT: 184.88 LBS | SYSTOLIC BLOOD PRESSURE: 111 MMHG | BODY MASS INDEX: 25.04 KG/M2 | DIASTOLIC BLOOD PRESSURE: 62 MMHG | HEART RATE: 77 BPM | OXYGEN SATURATION: 99 %

## 2023-11-06 DIAGNOSIS — I44.7 LEFT BUNDLE BRANCH BLOCK: ICD-10-CM

## 2023-11-06 DIAGNOSIS — Z86.73 HISTORY OF CEREBROVASCULAR ACCIDENT: ICD-10-CM

## 2023-11-06 DIAGNOSIS — R07.2 PRECORDIAL PAIN: ICD-10-CM

## 2023-11-06 DIAGNOSIS — I10 PRIMARY HYPERTENSION: ICD-10-CM

## 2023-11-06 DIAGNOSIS — E78.1 HYPERTRIGLYCERIDEMIA: ICD-10-CM

## 2023-11-06 DIAGNOSIS — I50.22 HEART FAILURE, SYSTOLIC, CHRONIC: ICD-10-CM

## 2023-11-06 DIAGNOSIS — E78.00 HYPERCHOLESTEROLEMIA: ICD-10-CM

## 2023-11-06 DIAGNOSIS — E11.59 TYPE 2 DIABETES MELLITUS WITH OTHER CIRCULATORY COMPLICATION, WITHOUT LONG-TERM CURRENT USE OF INSULIN: ICD-10-CM

## 2023-11-06 DIAGNOSIS — I25.10 CORONARY ARTERY DISEASE INVOLVING NATIVE CORONARY ARTERY OF NATIVE HEART WITHOUT ANGINA PECTORIS: ICD-10-CM

## 2023-11-06 PROCEDURE — 99999 PR PBB SHADOW E&M-EST. PATIENT-LVL III: CPT | Mod: PBBFAC,,, | Performed by: INTERNAL MEDICINE

## 2023-11-06 PROCEDURE — 99214 PR OFFICE/OUTPT VISIT, EST, LEVL IV, 30-39 MIN: ICD-10-PCS | Mod: S$PBB,25,, | Performed by: INTERNAL MEDICINE

## 2023-11-06 PROCEDURE — 99999 PR PBB SHADOW E&M-EST. PATIENT-LVL III: ICD-10-PCS | Mod: PBBFAC,,, | Performed by: INTERNAL MEDICINE

## 2023-11-06 PROCEDURE — 99213 OFFICE O/P EST LOW 20 MIN: CPT | Mod: PBBFAC | Performed by: INTERNAL MEDICINE

## 2023-11-06 PROCEDURE — 93010 EKG 12-LEAD: ICD-10-PCS | Mod: ,,, | Performed by: INTERNAL MEDICINE

## 2023-11-06 PROCEDURE — 99214 OFFICE O/P EST MOD 30 MIN: CPT | Mod: S$PBB,25,, | Performed by: INTERNAL MEDICINE

## 2023-11-06 PROCEDURE — 93005 ELECTROCARDIOGRAM TRACING: CPT | Mod: PBBFAC | Performed by: INTERNAL MEDICINE

## 2023-11-06 PROCEDURE — 93005 ELECTROCARDIOGRAM TRACING: CPT

## 2023-11-06 PROCEDURE — 93010 ELECTROCARDIOGRAM REPORT: CPT | Mod: ,,, | Performed by: INTERNAL MEDICINE

## 2023-11-06 NOTE — TELEPHONE ENCOUNTER
Patient states c/o onset of chest pain on yesterday morning, 11/05/23. Patient states chest pain has been constant > 5 minutes. Patient also states c/o increased tiredness.     Reason for Disposition   Chest pain lasting longer than 5 minutes and over 44 years old    Additional Information   Negative: SEVERE difficulty breathing (e.g., struggling for each breath, speaks in single words)   Negative: Difficult to awaken or acting confused (e.g., disoriented, slurred speech)   Negative: Shock suspected (e.g., cold/pale/clammy skin, too weak to stand, low BP, rapid pulse)   Negative: Passed out (i.e., lost consciousness, collapsed and was not responding)    Protocols used: Chest Pain-A-[place]

## 2023-11-06 NOTE — TELEPHONE ENCOUNTER
----- Message from Erum Acosta sent at 11/6/2023 10:03 AM CST -----  Regarding: Sooner Appt Request  Who Is Calling : HAL READ JR. [[id number]]        Reason For The Call: Patient is requesting a sooner appointment.  Patient declined first available and does not want to be added to the waitlist.  Please contact the patient to schedule.        Preferred Contact Method:  [phone]        Additional Information: Chest pains

## 2023-11-06 NOTE — PROGRESS NOTES
Subjective:     Calderon Burt Jr. is a 73 y.o. male with hypertension, hypercholesterolemia, hypertriglyceridemia and diabetes mellitus type 2. He has a healthy weight but used to be overweight. In 12/2019 he suffered a thalamic cerebrovascular accident. He presented with weakness in the left arm. In 4/2021 he began to experience a left sided chest discomfort. The pain came on when he was moving furniture at home. He was noted to have a left bundle branch block. On 6/14/2021 he had an echocardiogram that revealed normal left ventricular size with low normal systolic function with an ejection fraction of 55%. The septum contracted as with left bundle branch block. There was mild diastolic dysfunction. On 6/14/2021 he had a regadenoson MPI that revealed normal perfusion. The ejection fraction was 63%. Accordingly, there was nothing to suggest obstructive coronary artery disease. It appeared the chest pain most certainly was non cardiac in origin. He has not had any chest pain since. On 6/2/2023 he had an echocardiogram that revealed normal left ventricular size with moderate to severe systolic dysfunction. There was dyssynchrony as with left bundle branch block. The ejection fraction was 30%. There was mild mitral regurgitation. With a significant decrease in systolic dysfunction in the setting of several risk factors for vascular disease he was referred for coronary angiography. On 6/13/202 he underwent coronary angiography. The left main was very short with moderate calcification. The left anterior descending coronary artery had an osteal 40% lesion and proximal 30% disease. The first diagonal  branch had osteal 70% disease. The left circumflex coronary artery had osteal 30% disease. The right coronary artery was dominant with distal 30% disease. He will be treated medically for his coronary artery disease. On 11/5/2023 he felt sharp left shoulder and back pain. The pain got worse when moving his left arm. No  exertional shortness of breath. No palpitations or weak spells. No issues with any of his prescribed medications. Feeling well.        Chest Pain   This is a chronic problem. The current episode started more than 1 year ago. The problem has been resolved. Associated symptoms include back pain. Pertinent negatives include no abdominal pain, claudication, cough, diaphoresis, dizziness, exertional chest pressure, fever, headaches, hemoptysis, irregular heartbeat, leg pain, lower extremity edema, malaise/fatigue, nausea, near-syncope, numbness, orthopnea, palpitations, PND, shortness of breath, sputum production, syncope, vomiting or weakness.   His past medical history is significant for CAD, CHF, diabetes and hyperlipidemia.   Pertinent negatives for past medical history include no muscle weakness.   Cerebrovascular Accident  This is a chronic problem. Associated symptoms include chest pain and neck pain. Pertinent negatives include no abdominal pain, anorexia, arthralgias, change in bowel habit, chills, congestion, coughing, diaphoresis, fatigue, fever, headaches, joint swelling, myalgias, nausea, numbness, rash, sore throat, swollen glands, urinary symptoms, vertigo, visual change, vomiting or weakness.   Hypertension  This is a chronic problem. The current episode started more than 1 year ago. The problem is controlled (usually 125-130/75-80 mmHg at home). Associated symptoms include chest pain and neck pain. Pertinent negatives include no anxiety, blurred vision, headaches, malaise/fatigue, orthopnea, palpitations, peripheral edema, PND, shortness of breath or sweats. There is no history of chronic renal disease.   Hyperlipidemia  This is a chronic problem. The current episode started more than 1 year ago. Recent lipid tests were reviewed and are normal. Exacerbating diseases include diabetes. He has no history of chronic renal disease, hypothyroidism, liver disease, obesity or nephrotic syndrome. Associated  symptoms include chest pain. Pertinent negatives include no focal sensory loss, focal weakness, leg pain, myalgias or shortness of breath.   Congestive Heart Failure  Presents for follow-up visit. Associated symptoms include chest pain. Pertinent negatives include no abdominal pain, chest pressure, claudication, edema, fatigue, muscle weakness, near-syncope, nocturia, orthopnea, palpitations, paroxysmal nocturnal dyspnea or shortness of breath. His past medical history is significant for CAD.   Coronary Artery Disease  Presents for follow-up visit. Symptoms include chest pain. Pertinent negatives include no chest pressure, chest tightness, dizziness, leg swelling, muscle weakness, palpitations, shortness of breath or weight gain. Risk factors include hyperlipidemia. Risk factors do not include obesity. His past medical history is significant for CHF. The symptoms have been stable.       Review of Systems   Constitutional: Negative for chills, diaphoresis, fatigue, fever, malaise/fatigue and weight gain.   HENT:  Negative for congestion, nosebleeds and sore throat.    Eyes:  Negative for blurred vision, double vision, vision loss in left eye and vision loss in right eye.   Cardiovascular:  Positive for chest pain. Negative for claudication, dyspnea on exertion, irregular heartbeat, leg swelling, near-syncope, orthopnea, palpitations, paroxysmal nocturnal dyspnea and syncope.   Respiratory:  Negative for chest tightness, cough, hemoptysis, shortness of breath, sputum production and wheezing.    Endocrine: Negative for cold intolerance and heat intolerance.   Hematologic/Lymphatic: Negative for bleeding problem. Does not bruise/bleed easily.   Skin:  Negative for color change and rash.   Musculoskeletal:  Positive for back pain, joint pain (left shoulder) and neck pain. Negative for arthralgias, falls, joint swelling, muscle weakness and myalgias.   Gastrointestinal:  Negative for abdominal pain, anorexia, change in  bowel habit, heartburn, hematemesis, hematochezia, hemorrhoids, jaundice, melena, nausea and vomiting.   Genitourinary:  Negative for dysuria, hematuria and nocturia.   Neurological:  Negative for dizziness, focal weakness, headaches, light-headedness, loss of balance, numbness, tremors, vertigo and weakness.   Psychiatric/Behavioral:  Negative for altered mental status, depression and memory loss. The patient is not nervous/anxious.    Allergic/Immunologic: Negative for hives and persistent infections.       Current Outpatient Medications on File Prior to Visit   Medication Sig Dispense Refill    aspirin (ECOTRIN) 81 MG EC tablet Take 1 tablet (81 mg total) by mouth once daily. 90 tablet 3    atorvastatin (LIPITOR) 40 MG tablet Take 1 tablet (40 mg total) by mouth once daily. 90 tablet 3    benazepriL (LOTENSIN) 5 MG tablet Take 1 tablet (5 mg total) by mouth once daily. 90 tablet 3    bimatoprost (LUMIGAN) 0.01 % Drop 1 drop every evening.      bimatoprost (LUMIGAN) 0.01 % Drop Instill 1 drop into both eyes every evening 7.5 mL 4    brimonidine-timoloL (COMBIGAN) 0.2-0.5 % Drop Place 1 drop into both eyes.      carvediloL (COREG) 3.125 MG tablet Take 1 tablet (3.125 mg total) by mouth 2 (two) times daily. 180 tablet 3    empagliflozin (JARDIANCE) 10 mg tablet Take 1 tablet (10 mg total) by mouth once daily. 30 tablet 11    metFORMIN (GLUMETZA) 1000 MG (MOD) 24 hr tablet Take 1,000 mg by mouth 2 (two) times daily with meals.      psyllium (METAMUCIL) powder Take 1 packet by mouth once daily. Pt takes about twice a week      spironolactone (ALDACTONE) 25 MG tablet Take 1 tablet (25 mg total) by mouth once daily. 90 tablet 3    tamsulosin (FLOMAX) 0.4 mg Cap Take 0.4 mg by mouth once daily.       No current facility-administered medications on file prior to visit.       /62 (BP Location: Right arm, Patient Position: Sitting, BP Method: Medium (Manual))   Pulse 77   Ht 6' (1.829 m)   Wt 83.8 kg (184 lb 13.7  oz)   SpO2 99%   BMI 25.07 kg/m²     Objective:     Physical Exam  Constitutional:       General: He is not in acute distress.     Appearance: Normal appearance. He is well-developed. He is not toxic-appearing or diaphoretic.   HENT:      Head: Normocephalic and atraumatic.      Nose: Nose normal.   Eyes:      General:         Right eye: No discharge.         Left eye: No discharge.      Conjunctiva/sclera:      Right eye: Right conjunctiva is not injected.      Left eye: Left conjunctiva is not injected.      Pupils: Pupils are equal.      Right eye: Pupil is round.      Left eye: Pupil is round.   Neck:      Thyroid: No thyromegaly.      Vascular: No carotid bruit or JVD.   Cardiovascular:      Rate and Rhythm: Normal rate and regular rhythm. No extrasystoles are present.     Chest Wall: PMI is not displaced.      Pulses:           Radial pulses are 2+ on the right side and 2+ on the left side.        Femoral pulses are 2+ on the right side and 2+ on the left side.       Dorsalis pedis pulses are 2+ on the right side and 2+ on the left side.        Posterior tibial pulses are 2+ on the right side and 2+ on the left side.      Heart sounds: S1 normal and S2 normal. No murmur heard.     Gallop present. S4 sounds present. No S3 sounds.   Pulmonary:      Effort: Pulmonary effort is normal.      Breath sounds: Normal breath sounds.   Chest:      Chest wall: No swelling or tenderness.   Abdominal:      Palpations: Abdomen is soft.      Tenderness: There is no abdominal tenderness.   Musculoskeletal:      Cervical back: Neck supple.      Right ankle: No swelling, deformity or ecchymosis.      Left ankle: No swelling, deformity or ecchymosis.   Lymphadenopathy:      Head:      Right side of head: No submandibular adenopathy.      Left side of head: No submandibular adenopathy.      Cervical: No cervical adenopathy.   Skin:     General: Skin is warm and dry.      Findings: No rash.      Nails: There is no clubbing.       Comments: Mass consistent with lipoma right upper back.   Neurological:      General: No focal deficit present.      Mental Status: He is alert and oriented to person, place, and time. He is not disoriented.      Cranial Nerves: No cranial nerve deficit.   Psychiatric:         Attention and Perception: Attention and perception normal.         Mood and Affect: Mood and affect normal.         Speech: Speech normal.         Behavior: Behavior normal.         Thought Content: Thought content normal.         Cognition and Memory: Cognition and memory normal.         Judgment: Judgment normal.       Assessment:     1. Heart failure, systolic, chronic    2. Left bundle branch block    3. Coronary artery disease involving native coronary artery of native heart without angina pectoris    4. Precordial pain    5. History of cerebrovascular accident    6. Primary hypertension    7. Hypercholesterolemia    8. Hypertriglyceridemia    9. Type 2 diabetes mellitus with other circulatory complication, without long-term current use of insulin          Plan:     Heart Failure, Systolic, Chronic              6/2/2023: Echo: Normal left ventricular size with moderate to severe systolic dysfunction. LBBB. EF 30%. Mild MR.   6/8/2023: Carvedilol 6.25 mg Q12 and empagliflozin 10 mg Q24 were begun.    7/7/2023: Spironolactone 25 mg Q24 was begun.   10/18/2023: Carvedilol 6.25 mg Q12 was reduced to carvedilol 3.125 mg Q12 and benazepril 20 mg Q24 was reduced to benazepril 5 mg Q24 as blood pressure on low side.  On carvedilol 3.125 mg Q12, empagliflozin 10 mg Q24, benazepril 10 mg Q24 and spironolactone 25 mg Q24.  Possible CRT long term but QRS quite narrow.  12/2023: Plan next Echo.     2. Left Bundle Branch Block              2021: Diagnosed.              6/8/2023: SR. LBBB 134 ms.    11/6/2023: SR. LBBB.  ms.     3. Coronary Artery Disease  6/13/2023: [facility]: Cor: LM: Very short. Moderate calcification. LAD: Osteal 40%. Prox 30%.  D1 osteal 70%. LCX: Osteal 30%. RCA: Dominant. Distal 30%.   Medical therapy.  On atorvastatin 40 mg Q24.  On aspirin 81 mg Q24.     4. Chest Pain              4/2021: Began experience chest pain.              12/17/2019: Echo: Normal left ventricular size and systolic function.              6/14/2021: Echo: Normal left ventricular size with low normal systolic function. EF 55%. LBBB. Mild diastolic dysfunction.               6/14/2021: Regadenoson MPI: Normal perfusion. EF 63%.   11/5/2023: Atypical left shoulder and chest pain.              Chest pain is most certainly non-cardiac in origin.              Reassurance.                5. History of Cerebrovascular Accident              12/2019: Thalamic CVA. Left arm weakness.              All risk factors are addressed.              On aspirin 81 mg Q24.                6. Hypertension              2016: Diagnosed.               7/12/2021: Amlodipine 10 mg Q24 was begun as running high.    7/7/2023: Spironolactone 25 mg Q24 was begun and amlodipine 10 mg Q24 was discontinued.    On carvedilol 3.125 mg Q12, empagliflozin 10 mg Q24, benazepril 10 mg Q24 and spironolactone 25 mg Q24.              No been keeping log at home.              As above.                7. Hypercholesterolemia              2016: Began statin.               On atorvastatin 40 mg Q24.              12/16/2019: Chol 153. HDL 46. LDL 62. .              8/26/2022: Chol 139. HDL 54. LDL 66. .              On atorvastatin 40 mg Q24.              Favorable lipid panel.     8. Hypertriglyceridemia              2016: Diagnosed.              As above.     9. Diabetes Mellitus, Type 2              2018: Diagnosed. Complications: CVA. Medications: Oral agent.     6/8/2023: Empagliflozin 10 mg Q24 was begun for HF.               On metformin 500 mg Q12 and empagliflozin 10 mg Q24.              Tells me well controlled.     10. History of Overweight              6/3/2021: Weight 86 kg. BMI  26.   10/18/2023: Weight 82 kg. BMI 25.      11. Primary Care              Dr. Jigar Durant.    F/u 2 months.    Kevin Ham M.D.      12/6/2023 7:07 PM, Addendum:    12/6/2023: Echo: Normal left ventricular size with severe systolic dysfunction. Anteroseptal wall and apex severely hypokinetic. Remainder moderately hypokinetic. LBBB. EF 30%. Mild diastolic dysfunction.    He has LBBB but QRS only 136 ms.    I discussed the above test result and the implications of the findings over the phone.    Kevin Ham M.D.      Kevin Ham M.D.

## 2023-12-02 NOTE — HOSPITAL COURSE
"[facility]  Consult  Name: Blanca Mason 54 y.o. female I MRN: [id number]  Unit/Bed#: [facility] 651-02 I Date of Admission: 12/1/2023   Date of Service: 12/2/2023 I Hospital Day: 1    Inpatient consult for Medical Clearance for  patient  Consult performed by: JHONATAN Fields  Consult ordered by: JHONATAN Anne        Assessment/Plan   Medical clearance for psychiatric admission  Assessment & Plan  Patient is medically cleared for admission to [facility] and treatment of underlying psychiatric illness based on available results  No acute medical needs. Medicine will sign off. Please call with additional questions or concerns    History of pulmonary embolism  Assessment & Plan  CTA chest in September 2023: \"Large saddle embolus is seen which extends into the right and left lower lobar and several bilateral lower lobe segmental pulmonary arterial branches. There is associated evidence of right heart strain.\"  Repeat imaging showed improvement in clot burden followed by resolution of clot, however, was subopitmal imaging   Maintained on Coumadin 7 mg daily  INR slightly subtherapeutic at 1.99  Increase Coumadin to 10 mg daily  Monitor INR, goal 2-3   Will likely need to lower Coumadin dosing once INR becomes therapeutic     Edema  Assessment & Plan  Hst of BLE edema maintained on Lasix 20 mg daily  Continue Lasix     Benign essential hypertension  Assessment & Plan  Continue home, Losartan 100 mg daily, lasix 20 mg daily and Clonidine 0.1 mg BID   Monitor BP     Ingrown toenail  Assessment & Plan  Ingrown right great toe nail. Seen by podiatry as outpatient.  Continue Keflex 500 mg eery 6 hours to complete a 7-day course of therapy     Sjogren's syndrome (HCC)  Assessment & Plan  Supportive care     Overactive bladder  Assessment & Plan  Maintained on [place] which is not on formulary   Consider Oxybutynin     Hypothyroidism  Assessment & Plan  TSH within normal limits " After admission,  stroke work up was continued. MRI brain revealed small focal area of acute ishcemia to right thalamus. MRA head/neck without evidence for significant vascular stenosis or occlusion.  Echocardiogram  The patient's NIHSS = 2 for mild left facial droop and numbness to tongue without dysarthria. Neurology was consulted for further evaluation.   For secondary stroke prevention, the patient was continued on aspirin 81 mg daily and will add clopidogrel 75 mg daily for thirty days. His home dose of atorvastatin will be increased from 10 mg to 40 mg daily.  His blood pressure medication was initially held to allow for permissive hypertension.  He was subsequently restarted on home antihypertensive medication twenty four hours after arrival.  The patient reports active lifestyle, working out four to five days per week.  He was counseled on lifestyle modifications such as weight reduction and avoiding excessive alcohol.  Physical therapy, occupational and speech therapy evaluated the patient and recommended home without further need for therapy services. I have discussed the patient's stroke diagnosis and have educated him on medications for secondary stroke prevention.  I have reviewed the signs and symptoms of stroke and when to call 911.  The patient will follow up with his primary care provider as scheduled and with Vascular Neurology within on month.  He was discharged in stable condition to home.      Continue Synthroid 125 mcg daily     * Schizoaffective disorder, bipolar type (HCC)  Assessment & Plan  Multiple admissions to [facility]  Currently, admitted to [facility]  Management per primary service              Recommendations for Discharge:  SLIM will sign off - please call with questions or concerns.  Follow up with PCP upon discharge.     Counseling / Coordination of Care Time: 30 minutes.  Greater than 50% of total time spent on patient counseling and coordination of care.    Collaboration of Care: Were Recommendations Directly Discussed with Primary Treatment Team? - Yes     History of Present Illness:    Blanca Mason is a 54 y.o. female with a PMH including PE on Coumadin, HTN, edema, hypothyroidism, Sjogren's Syndrome and schizophrenia with SI who is originally admitted to the psychiatric service due to intentional overdose of Trazodone. We are consulted for medical clearance for psychiatric hospitalization and medical management. Patient initially presented to [facility] ED with intentional overdose on Trazodone. Patient seen by Crisis. A 302 was filed. Patient was transferred to [facility] for [facility].     Review of Systems:    Review of Systems   Constitutional:  Negative for appetite change and chills.   HENT:  Negative for congestion and sore throat.    Eyes:  Negative for visual disturbance.   Respiratory:  Negative for cough and shortness of breath.    Cardiovascular:  Negative for chest pain.   Gastrointestinal:  Negative for abdominal pain, constipation, diarrhea, nausea and vomiting.   Genitourinary:  Negative for difficulty urinating.   Musculoskeletal:  Negative for gait problem.   Skin:  Negative for wound.   Neurological:  Negative for dizziness, light-headedness and headaches.       Past Medical and Surgical History:     Past Medical History:   Diagnosis Date    Anxiety     Arthritis     oa cassandra knees    Asthma     good control- no medications    Yan's esophagus     Bipolar affective disorder  "(HCC)     Cannabis abuse with unspecified cannabis-induced disorder (HCC)     Chronic pain disorder     lumbar    COPD (chronic obstructive pulmonary disease) (HCC)     CPAP (continuous positive airway pressure) dependence     DDD (degenerative disc disease), lumbar 04/09/2015    Degenerative disc disease at L5-S1 level     Deliberate self-cutting     9/15/22per pt has not done recently-- does see a therapist and psychiatrist regularly    Depression 09/16/2008    Disease of thyroid gland     hypo    MARTINEZ (dyspnea on exertion)     per pt \"has had this with exertion and not new\"    Drug overdose 10/28/2008    suicide attempt and again drug overdose 7/2022-- [facility] floor and than transferred to [facility]    Dysphagia     Dyspnea     Edema     BLE    Family history of blood clots     GERD (gastroesophageal reflux disease)     Headache(784.0)     Headache, tension-type     Hemorrhage of rectum and anus 10/02/2017    Formatting of this note might be different from the original.  Added automatically from request for surgery [id number]    High blood pressure     History of COVID-19 12/30/2020    Symptoms started on 12/30/2020 and then got worse.  Today she is feeling a little bit better.  She is a candidate for monoclonal antibody infusion and set for 1/6/21 @ 1pm at [facility]. 01/07/21 - telemedicine -     Hyperlipidemia     Hypertension     Knee pain, bilateral     Especially right    Medial epicondylitis of elbow, left 04/03/2018    Formatting of this note might be different from the original.  Added automatically from request for surgery [id number]    Medial epicondylitis of right elbow 07/17/2023    Medical marijuana use     Memory loss     Migraines     Obesity     Overactive bladder     Primary osteoarthritis of both knees 08/08/2018    Pulmonary emboli (HCC)     Restrictive lung disease 02/01/2017    Last Assessment & Plan:   Formatting of this note might be different from the original.  I reviewed this problem " throughout the rest of the note. Please refer to the other assessments for details.    Sjogren's disease (HCC)     Sleep apnea     Stress incontinence     Suicidal ideations     Teeth missing     Tremor     per pt Tremors of Right Leg and both Arms    Visual impairment     Wears glasses        Past Surgical History:   Procedure Laterality Date    BREAST CYST EXCISION Right 1989    CARPAL TUNNEL RELEASE Left     CHOLECYSTECTOMY  05/2003    Laparoscopic    COLONOSCOPY      01/12/2009    DILATION AND CURETTAGE OF UTERUS      ELBOW SURGERY Left     x2. No hardware    ESOPHAGOGASTRODUODENOSCOPY      FOOT SURGERY Left     Plantar fasciotomy    HERNIA REPAIR      HYSTERECTOMY      laporoscopic, partial    KNEE ARTHROSCOPY Bilateral     OOPHORECTOMY Left 10/2015    MT GASTROCNEMIUS RECESSION Left 02/24/2021    Procedure: RECESSION GASTROC OPEN;  Surgeon: Nomi Yang DPM;  Location:  MAIN OR;  Service: Podiatry    MT RPR UMBILICAL HRNA 5 YRS/> REDUCIBLE N/A 04/24/2019    Procedure: REPAIR HERNIA UMBILICAL LAPAROSCOPIC W/ ROBOTICS;  Surgeon: Anahi Colon MD;  Location: [facility];  Service: General    MT SPHINCTEROTOMY ANAL DIVISION SPHINCTER SPX N/A 08/31/2018    Procedure: EUA, LEFT PARTIAL INTERNAL SPHINCTEROTOMY;  Surgeon: Tien Reyes MD;  Location:  MAIN OR;  Service: Colorectal    MT TNOT ELBOW LATERAL/MEDIAL DEBRIDE OPEN TDN RPR Left 09/20/2022    Procedure: RELEASE EPICONDYLAR ELBOW MEDIAL;  Surgeon: Kyree Winkler MD;  Location: [facility] OR;  Service: Orthopedics    REDUCTION MAMMAPLASTY Bilateral 1999    REPAIR LACERATION Left     left hand-5/18/2009    REPLACEMENT TOTAL KNEE Right     ROTATOR CUFF REPAIR Left     TONSILECTOMY AND ADNOIDECTOMY      US GUIDED MSK PROCEDURE  04/22/2021    VASCULAR SURGERY      VEIN LIGATION Bilateral     WISDOM TOOTH EXTRACTION         Meds/Allergies:    all medications and allergies reviewed    Allergies:   Allergies   Allergen Reactions    Percocet  "[Oxycodone-Acetaminophen] Headache     Severe headaches   (denies issues with Tylenol)    Povidone Iodine Rash     Unsure if betadine or gauze post surgical. Got rash on leg.   Has  Had itchy rashes after every surgery prep and IV insertion    Chlorhexidine Rash     Per pt \"able to use the liquid soap--thinkd reaction from the sponges or wipes\"       Social History:     Marital Status: Single    Substance Use History:   Social History     Substance and Sexual Activity   Alcohol Use Not Currently    Comment: Recovering alcoholic     Social History     Tobacco Use   Smoking Status Former    Packs/day: 2.00    Years: 30.00    Total pack years: 60.00    Types: Cigarettes    Start date: 1985    Quit date: 2018    Years since quittin.9   Smokeless Tobacco Never   Tobacco Comments    Started at age 17.     Social History     Substance and Sexual Activity   Drug Use Yes    Types: Marijuana, Methamphetamines    Comment: 2 months off meth       Family History:    Family History   Problem Relation Age of Onset    Kidney cancer Mother     Diabetes Mother     Depression Mother     Stroke Mother     Arthritis Mother     Cancer Mother     Psychiatric Illness Mother     No Known Problems Father     No Known Problems Sister     No Known Problems Maternal Grandmother     No Known Problems Maternal Grandfather     No Known Problems Paternal Grandmother     No Known Problems Paternal Grandfather     Colon cancer Maternal Uncle     Colon cancer Maternal Uncle     Colon cancer Paternal Uncle     Colon cancer Family     Alcohol abuse Sister     Asthma Sister        Physical Exam:     Vitals:   Blood Pressure: 131/78 (23)  Pulse: 82 (23)  Temperature: 98.6 °F (37 °C) (23)  Temp Source: Temporal (23)  Respirations: 16 (23)  Height: 5' 6\" (167.6 cm) (23)  Weight - Scale: 134 kg (296 lb 4.8 oz) (23)  SpO2: 95 % (23)    Physical Exam  Vitals and " nursing note reviewed.   Constitutional:       General: She is not in acute distress.     Appearance: She is obese. She is not toxic-appearing or diaphoretic.   HENT:      Head: Normocephalic.      Mouth/Throat:      Mouth: Mucous membranes are moist.   Eyes:      Conjunctiva/sclera: Conjunctivae normal.   Cardiovascular:      Rate and Rhythm: Normal rate.   Pulmonary:      Effort: Pulmonary effort is normal.      Breath sounds: Normal breath sounds.   Abdominal:      General: Bowel sounds are normal.      Palpations: Abdomen is soft.   Musculoskeletal:         General: Normal range of motion.      Cervical back: Normal range of motion.      Right lower leg: No edema.      Left lower leg: No edema.   Skin:     General: Skin is warm and dry.      Capillary Refill: Capillary refill takes less than 2 seconds.   Neurological:      Mental Status: She is alert and oriented to person, place, and time. Mental status is at baseline.   Psychiatric:         Mood and Affect: Mood is depressed. Affect is flat.         Speech: Speech normal.         Behavior: Behavior is cooperative.         Additional Data:     Lab Results:     Results from last 7 days   Lab Units 12/02/23  0534   WBC Thousand/uL 8.96   HEMOGLOBIN g/dL 12.9   HEMATOCRIT % 40.5   PLATELETS Thousands/uL 164   NEUTROS PCT % 65   LYMPHS PCT % 25   MONOS PCT % 8   EOS PCT % 1     Results from last 7 days   Lab Units 12/02/23  0534   SODIUM mmol/L 139   POTASSIUM mmol/L 3.8   CHLORIDE mmol/L 107   CO2 mmol/L 21   BUN mg/dL 13   CREATININE mg/dL 0.62   ANION GAP mmol/L 11   CALCIUM mg/dL 8.7   ALBUMIN g/dL 3.5   TOTAL BILIRUBIN mg/dL 0.25   ALK PHOS U/L 55   ALT U/L 13   AST U/L 13   GLUCOSE RANDOM mg/dL 107     Results from last 7 days   Lab Units 12/01/23  0703   INR  1.98*         Lab Results   Component Value Date/Time    HGBA1C 5.0 01/05/2023 11:53 AM    HGBA1C 5.2 02/22/2022 08:28 AM    HGBA1C 4.9 12/18/2020 09:07 AM     Results from last 7 days   Lab Units  12/01/23  0649   POC GLUCOSE mg/dl 88           Imaging: I have personally reviewed pertinent reports.      No orders to display       EKG, Pathology, and Other Studies Reviewed on Admission:   EKG: see above documentation    ** Please Note: This note has been constructed using a voice recognition system. **

## 2023-12-06 ENCOUNTER — HOSPITAL ENCOUNTER (OUTPATIENT)
Dept: CARDIOLOGY | Facility: OTHER | Age: 73
Discharge: HOME | End: 2023-12-06
Attending: INTERNAL MEDICINE
Payer: MEDICARE

## 2023-12-06 VITALS
DIASTOLIC BLOOD PRESSURE: 62 MMHG | SYSTOLIC BLOOD PRESSURE: 111 MMHG | WEIGHT: 184 LBS | HEIGHT: 72 IN | BODY MASS INDEX: 24.92 KG/M2

## 2023-12-06 DIAGNOSIS — I50.22 HEART FAILURE, SYSTOLIC, CHRONIC: ICD-10-CM

## 2023-12-06 LAB
ASCENDING AORTA: 2.59 CM
AV INDEX (PROSTH): 0.83
AV MEAN GRADIENT: 2 MMHG
AV PEAK GRADIENT: 4 MMHG
AV VALVE AREA BY VELOCITY RATIO: 3.14 CM²
AV VALVE AREA: 3 CM²
AV VELOCITY RATIO: 0.87
BSA FOR ECHO PROCEDURE: 2.06 M2
CV ECHO LV RWT: 0.36 CM
DOP CALC AO PEAK VEL: 0.95 M/S
DOP CALC AO VTI: 21.2 CM
DOP CALC LVOT AREA: 3.6 CM2
DOP CALC LVOT DIAMETER: 2.14 CM
DOP CALC LVOT PEAK VEL: 0.83 M/S
DOP CALC LVOT STROKE VOLUME: 63.63 CM3
DOP CALCLVOT PEAK VEL VTI: 17.7 CM
E WAVE DECELERATION TIME: 81.29 MSEC
E/A RATIO: 0.55
ECHO LV POSTERIOR WALL: 0.83 CM (ref 0.6–1.1)
EJECTION FRACTION: 30 %
FRACTIONAL SHORTENING: 18 % (ref 28–44)
GLOBAL LONGITUIDAL STRAIN: 10.8 %
INTERVENTRICULAR SEPTUM: 0.81 CM (ref 0.6–1.1)
IVC DIAMETER: 1.06 CM
LA MAJOR: 3.73 CM
LA MINOR: 4.26 CM
LA WIDTH: 3.8 CM
LEFT ATRIUM SIZE: 2.81 CM
LEFT ATRIUM VOLUME INDEX MOD: 16 ML/M2
LEFT ATRIUM VOLUME INDEX: 17.5 ML/M2
LEFT ATRIUM VOLUME MOD: 33 CM3
LEFT ATRIUM VOLUME: 36.1 CM3
LEFT INTERNAL DIMENSION IN SYSTOLE: 3.71 CM (ref 2.1–4)
LEFT VENTRICLE DIASTOLIC VOLUME INDEX: 46.1 ML/M2
LEFT VENTRICLE DIASTOLIC VOLUME: 94.96 ML
LEFT VENTRICLE MASS INDEX: 58 G/M2
LEFT VENTRICLE SYSTOLIC VOLUME INDEX: 28.4 ML/M2
LEFT VENTRICLE SYSTOLIC VOLUME: 58.5 ML
LEFT VENTRICULAR INTERNAL DIMENSION IN DIASTOLE: 4.55 CM (ref 3.5–6)
LEFT VENTRICULAR MASS: 119.56 G
LVOT MG: 1.32 MMHG
LVOT MV: 0.54 CM/S
MV PEAK A VEL: 0.91 M/S
MV PEAK E VEL: 0.5 M/S
MV STENOSIS PRESSURE HALF TIME: 23.57 MS
MV VALVE AREA P 1/2 METHOD: 9.33 CM2
PISA TR MAX VEL: 1.78 M/S
PV MV: 0.62 M/S
PV PEAK GRADIENT: 3 MMHG
PV PEAK VELOCITY: 0.9 M/S
RA MAJOR: 3.18 CM
RA PRESSURE ESTIMATED: 3 MMHG
RA WIDTH: 3.2 CM
RV TB RVSP: 5 MMHG
SINUS: 3 CM
STJ: 2.63 CM
TR MAX PG: 13 MMHG
TV REST PULMONARY ARTERY PRESSURE: 16 MMHG
Z-SCORE OF LEFT VENTRICULAR DIMENSION IN END DIASTOLE: -3.13
Z-SCORE OF LEFT VENTRICULAR DIMENSION IN END SYSTOLE: -0.21

## 2023-12-06 PROCEDURE — 93306 TTE W/DOPPLER COMPLETE: CPT | Mod: 26,,, | Performed by: INTERNAL MEDICINE

## 2023-12-06 PROCEDURE — 93306 TTE W/DOPPLER COMPLETE: CPT

## 2023-12-06 PROCEDURE — 93306 ECHO (CUPID ONLY): ICD-10-PCS | Mod: 26,,, | Performed by: INTERNAL MEDICINE

## 2023-12-19 ENCOUNTER — OFFICE VISIT (OUTPATIENT)
Dept: CARDIOLOGY | Facility: CLINIC | Age: 73
End: 2023-12-19
Attending: INTERNAL MEDICINE
Payer: MEDICARE

## 2023-12-19 VITALS
DIASTOLIC BLOOD PRESSURE: 71 MMHG | HEIGHT: 72 IN | HEART RATE: 99 BPM | SYSTOLIC BLOOD PRESSURE: 122 MMHG | OXYGEN SATURATION: 99 % | BODY MASS INDEX: 25.12 KG/M2 | WEIGHT: 185.5 LBS

## 2023-12-19 DIAGNOSIS — I10 PRIMARY HYPERTENSION: ICD-10-CM

## 2023-12-19 DIAGNOSIS — Z86.73 HISTORY OF CEREBROVASCULAR ACCIDENT: ICD-10-CM

## 2023-12-19 DIAGNOSIS — I25.10 CORONARY ARTERY DISEASE INVOLVING NATIVE CORONARY ARTERY OF NATIVE HEART WITHOUT ANGINA PECTORIS: ICD-10-CM

## 2023-12-19 DIAGNOSIS — E78.00 HYPERCHOLESTEROLEMIA: ICD-10-CM

## 2023-12-19 DIAGNOSIS — E78.1 HYPERTRIGLYCERIDEMIA: ICD-10-CM

## 2023-12-19 DIAGNOSIS — E11.59 TYPE 2 DIABETES MELLITUS WITH OTHER CIRCULATORY COMPLICATION, WITHOUT LONG-TERM CURRENT USE OF INSULIN: ICD-10-CM

## 2023-12-19 DIAGNOSIS — I44.7 LEFT BUNDLE BRANCH BLOCK: ICD-10-CM

## 2023-12-19 DIAGNOSIS — R07.2 PRECORDIAL PAIN: ICD-10-CM

## 2023-12-19 DIAGNOSIS — I50.22 HEART FAILURE, SYSTOLIC, CHRONIC: ICD-10-CM

## 2023-12-19 PROCEDURE — 99213 OFFICE O/P EST LOW 20 MIN: CPT | Mod: PBBFAC | Performed by: INTERNAL MEDICINE

## 2023-12-19 PROCEDURE — 99214 OFFICE O/P EST MOD 30 MIN: CPT | Mod: S$PBB,,, | Performed by: INTERNAL MEDICINE

## 2023-12-19 PROCEDURE — 99999 PR PBB SHADOW E&M-EST. PATIENT-LVL III: ICD-10-PCS | Mod: PBBFAC,,, | Performed by: INTERNAL MEDICINE

## 2023-12-19 PROCEDURE — 99214 PR OFFICE/OUTPT VISIT, EST, LEVL IV, 30-39 MIN: ICD-10-PCS | Mod: S$PBB,,, | Performed by: INTERNAL MEDICINE

## 2023-12-19 PROCEDURE — 99999 PR PBB SHADOW E&M-EST. PATIENT-LVL III: CPT | Mod: PBBFAC,,, | Performed by: INTERNAL MEDICINE

## 2023-12-19 NOTE — PROGRESS NOTES
Subjective:     Calderon Burt Jr. is a 73 y.o. male with hypertension, hypercholesterolemia, hypertriglyceridemia and diabetes mellitus type 2. He has a healthy weight but used to be overweight. In 12/2019 he suffered a thalamic cerebrovascular accident. He presented with weakness in the left arm. In 4/2021 he began to experience a left sided chest discomfort. The pain came on when he was moving furniture at home. He was noted to have a left bundle branch block. On 6/14/2021 he had an echocardiogram that revealed normal left ventricular size with low normal systolic function with an ejection fraction of 55%. The septum contracted as with left bundle branch block. There was mild diastolic dysfunction. On 6/14/2021 he had a regadenoson MPI that revealed normal perfusion. The ejection fraction was 63%. Accordingly, there was nothing to suggest obstructive coronary artery disease. It appeared the chest pain most certainly was non cardiac in origin. He has not had any chest pain since. On 6/2/2023 he had an echocardiogram that revealed normal left ventricular size with moderate to severe systolic dysfunction. There was dyssynchrony as with left bundle branch block. The ejection fraction was 30%. There was mild mitral regurgitation. With a significant decrease in systolic dysfunction in the setting of several risk factors for vascular disease he was referred for coronary angiography. On 6/13/202 he underwent coronary angiography. The left main was very short with moderate calcification. The left anterior descending coronary artery had an osteal 40% lesion and proximal 30% disease. The first diagonal  branch had osteal 70% disease. The left circumflex coronary artery had osteal 30% disease. The right coronary artery was dominant with distal 30% disease. He will be treated medically for his coronary artery disease. On 11/5/2023 he felt sharp left shoulder and back pain. The pain got worse when moving his left arm. The chest  pain later resolved. No exertional shortness of breath. No palpitations or weak spells. No issues with any of his prescribed medications. Feeling well.        Chest Pain   This is a chronic problem. The current episode started more than 1 year ago. The problem has been resolved. Associated symptoms include back pain. Pertinent negatives include no abdominal pain, claudication, cough, diaphoresis, dizziness, exertional chest pressure, fever, headaches, hemoptysis, irregular heartbeat, leg pain, lower extremity edema, malaise/fatigue, nausea, near-syncope, numbness, orthopnea, palpitations, PND, shortness of breath, sputum production, syncope, vomiting or weakness.   His past medical history is significant for CAD, CHF, diabetes and hyperlipidemia.   Pertinent negatives for past medical history include no muscle weakness.   Cerebrovascular Accident  This is a chronic problem. Associated symptoms include chest pain and neck pain. Pertinent negatives include no abdominal pain, anorexia, arthralgias, change in bowel habit, chills, congestion, coughing, diaphoresis, fatigue, fever, headaches, joint swelling, myalgias, nausea, numbness, rash, sore throat, swollen glands, urinary symptoms, vertigo, visual change, vomiting or weakness.   Hypertension  This is a chronic problem. The current episode started more than 1 year ago. The problem is controlled (usually 125-130/75-80 mmHg at home). Associated symptoms include chest pain and neck pain. Pertinent negatives include no anxiety, blurred vision, headaches, malaise/fatigue, orthopnea, palpitations, peripheral edema, PND, shortness of breath or sweats. There is no history of chronic renal disease.   Hyperlipidemia  This is a chronic problem. The current episode started more than 1 year ago. Recent lipid tests were reviewed and are normal. Exacerbating diseases include diabetes. He has no history of chronic renal disease, hypothyroidism, liver disease, obesity or nephrotic  syndrome. Associated symptoms include chest pain. Pertinent negatives include no focal sensory loss, focal weakness, leg pain, myalgias or shortness of breath.   Congestive Heart Failure  Presents for follow-up visit. Associated symptoms include chest pain. Pertinent negatives include no abdominal pain, chest pressure, claudication, edema, fatigue, muscle weakness, near-syncope, nocturia, orthopnea, palpitations, paroxysmal nocturnal dyspnea or shortness of breath. His past medical history is significant for CAD.   Coronary Artery Disease  Presents for follow-up visit. Symptoms include chest pain. Pertinent negatives include no chest pressure, chest tightness, dizziness, leg swelling, muscle weakness, palpitations, shortness of breath or weight gain. Risk factors include hyperlipidemia. Risk factors do not include obesity. His past medical history is significant for CHF. The symptoms have been stable.       Review of Systems   Constitutional: Negative for chills, diaphoresis, fatigue, fever, malaise/fatigue and weight gain.   HENT:  Negative for congestion, nosebleeds and sore throat.    Eyes:  Negative for blurred vision, double vision, vision loss in left eye and vision loss in right eye.   Cardiovascular:  Positive for chest pain. Negative for claudication, dyspnea on exertion, irregular heartbeat, leg swelling, near-syncope, orthopnea, palpitations, paroxysmal nocturnal dyspnea and syncope.   Respiratory:  Negative for chest tightness, cough, hemoptysis, shortness of breath, sputum production and wheezing.    Endocrine: Negative for cold intolerance and heat intolerance.   Hematologic/Lymphatic: Negative for bleeding problem. Does not bruise/bleed easily.   Skin:  Negative for color change and rash.   Musculoskeletal:  Positive for back pain, joint pain (left shoulder) and neck pain. Negative for arthralgias, falls, joint swelling, muscle weakness and myalgias.   Gastrointestinal:  Negative for abdominal pain,  anorexia, change in bowel habit, heartburn, hematemesis, hematochezia, hemorrhoids, jaundice, melena, nausea and vomiting.   Genitourinary:  Negative for dysuria, hematuria and nocturia.   Neurological:  Negative for dizziness, focal weakness, headaches, light-headedness, loss of balance, numbness, tremors, vertigo and weakness.   Psychiatric/Behavioral:  Negative for altered mental status, depression and memory loss. The patient is not nervous/anxious.    Allergic/Immunologic: Negative for hives and persistent infections.       Current Outpatient Medications on File Prior to Visit   Medication Sig Dispense Refill    aspirin (ECOTRIN) 81 MG EC tablet Take 1 tablet (81 mg total) by mouth once daily. 90 tablet 3    atorvastatin (LIPITOR) 40 MG tablet Take 1 tablet (40 mg total) by mouth once daily. 90 tablet 3    benazepriL (LOTENSIN) 5 MG tablet Take 1 tablet (5 mg total) by mouth once daily. 90 tablet 3    bimatoprost (LUMIGAN) 0.01 % Drop 1 drop every evening.      bimatoprost (LUMIGAN) 0.01 % Drop Instill 1 drop into both eyes every evening 7.5 mL 4    brimonidine-timoloL (COMBIGAN) 0.2-0.5 % Drop Place 1 drop into both eyes.      carvediloL (COREG) 3.125 MG tablet Take 1 tablet (3.125 mg total) by mouth 2 (two) times daily. 180 tablet 3    empagliflozin (JARDIANCE) 10 mg tablet Take 1 tablet (10 mg total) by mouth once daily. 30 tablet 11    metFORMIN (GLUMETZA) 1000 MG (MOD) 24 hr tablet Take 1,000 mg by mouth 2 (two) times daily with meals.      psyllium (METAMUCIL) powder Take 1 packet by mouth once daily. Pt takes about twice a week      spironolactone (ALDACTONE) 25 MG tablet Take 1 tablet (25 mg total) by mouth once daily. 90 tablet 3    tamsulosin (FLOMAX) 0.4 mg Cap Take 0.4 mg by mouth once daily.       No current facility-administered medications on file prior to visit.       /71   Pulse 99   Ht 6' (1.829 m)   Wt 84.1 kg (185 lb 8.3 oz)   SpO2 99%   BMI 25.16 kg/m²     Objective:     Physical  Exam  Constitutional:       General: He is not in acute distress.     Appearance: Normal appearance. He is well-developed. He is not toxic-appearing or diaphoretic.   HENT:      Head: Normocephalic and atraumatic.      Nose: Nose normal.   Eyes:      General:         Right eye: No discharge.         Left eye: No discharge.      Conjunctiva/sclera:      Right eye: Right conjunctiva is not injected.      Left eye: Left conjunctiva is not injected.      Pupils: Pupils are equal.      Right eye: Pupil is round.      Left eye: Pupil is round.   Neck:      Thyroid: No thyromegaly.      Vascular: No carotid bruit or JVD.   Cardiovascular:      Rate and Rhythm: Normal rate and regular rhythm. No extrasystoles are present.     Chest Wall: PMI is not displaced.      Pulses:           Radial pulses are 2+ on the right side and 2+ on the left side.        Femoral pulses are 2+ on the right side and 2+ on the left side.       Dorsalis pedis pulses are 2+ on the right side and 2+ on the left side.        Posterior tibial pulses are 2+ on the right side and 2+ on the left side.      Heart sounds: S1 normal and S2 normal. No murmur heard.     Gallop present. S4 sounds present. No S3 sounds.   Pulmonary:      Effort: Pulmonary effort is normal.      Breath sounds: Normal breath sounds.   Chest:      Chest wall: No swelling or tenderness.   Abdominal:      Palpations: Abdomen is soft.      Tenderness: There is no abdominal tenderness.   Musculoskeletal:      Cervical back: Neck supple.      Right ankle: No swelling, deformity or ecchymosis.      Left ankle: No swelling, deformity or ecchymosis.   Lymphadenopathy:      Head:      Right side of head: No submandibular adenopathy.      Left side of head: No submandibular adenopathy.      Cervical: No cervical adenopathy.   Skin:     General: Skin is warm and dry.      Findings: No rash.      Nails: There is no clubbing.      Comments: Mass consistent with lipoma right upper back.    Neurological:      General: No focal deficit present.      Mental Status: He is alert and oriented to person, place, and time. He is not disoriented.      Cranial Nerves: No cranial nerve deficit.   Psychiatric:         Attention and Perception: Attention and perception normal.         Mood and Affect: Mood and affect normal.         Speech: Speech normal.         Behavior: Behavior normal.         Thought Content: Thought content normal.         Cognition and Memory: Cognition and memory normal.         Judgment: Judgment normal.       Assessment:     1. Heart failure, systolic, chronic    2. Left bundle branch block    3. Coronary artery disease involving native coronary artery of native heart without angina pectoris    4. Precordial pain    5. History of cerebrovascular accident    6. Primary hypertension    7. Hypercholesterolemia    8. Hypertriglyceridemia    9. Type 2 diabetes mellitus with other circulatory complication, without long-term current use of insulin          Plan:     Heart Failure, Systolic, Chronic              6/2/2023: Echo: Normal left ventricular size with moderate to severe systolic dysfunction. LBBB. EF 30%. Mild MR.   12/6/2023: Echo: Normal left ventricular size with severe systolic dysfunction. Anteroseptal wall and apex severely hypokinetic. Remainder moderately hypokinetic. LBBB. EF 30%. Mild diastolic dysfunction   6/8/2023: Carvedilol 6.25 mg Q12 and empagliflozin 10 mg Q24 were begun.    7/7/2023: Spironolactone 25 mg Q24 was begun.   10/18/2023: Carvedilol 6.25 mg Q12 was reduced to carvedilol 3.125 mg Q12 and benazepril 20 mg Q24 was reduced to benazepril 5 mg Q24 as blood pressure on low side.  On carvedilol 3.125 mg Q12, empagliflozin 10 mg Q24, benazepril 10 mg Q24 and spironolactone 25 mg Q24.  Possible CRT long term but QRS quite narrow.  12/2024: Plan next Echo.     2. Left Bundle Branch Block              2021: Diagnosed.              6/8/2023: SR. LBBB 134 ms.     11/6/2023: SR. LBBB.  ms.   He has LBBB but QRS only 136 ms.     3. Coronary Artery Disease  6/13/2023: [facility]: Cor: LM: Very short. Moderate calcification. LAD: Osteal 40%. Prox 30%. D1 osteal 70%. LCX: Osteal 30%. RCA: Dominant. Distal 30%.   Medical therapy.  On atorvastatin 40 mg Q24.  On aspirin 81 mg Q24.     4. Chest Pain              4/2021: Began experience chest pain.              12/17/2019: Echo: Normal left ventricular size and systolic function.              6/14/2021: Echo: Normal left ventricular size with low normal systolic function. EF 55%. LBBB. Mild diastolic dysfunction.               6/14/2021: Regadenoson MPI: Normal perfusion. EF 63%.   11/5/2023: Atypical left shoulder and chest pain.              Chest pain is most certainly non-cardiac in origin.              Reassurance.   Resolved.                5. History of Cerebrovascular Accident              12/2019: Thalamic CVA. Left arm weakness.              All risk factors are addressed.              On aspirin 81 mg Q24.                6. Hypertension              2016: Diagnosed.               7/12/2021: Amlodipine 10 mg Q24 was begun as running high.    7/7/2023: Spironolactone 25 mg Q24 was begun and amlodipine 10 mg Q24 was discontinued.    On carvedilol 3.125 mg Q12, empagliflozin 10 mg Q24, benazepril 10 mg Q24 and spironolactone 25 mg Q24.              Keeping log at home.              Well controlled.                7. Hypercholesterolemia              2016: Began statin.               On atorvastatin 40 mg Q24.              12/16/2019: Chol 153. HDL 46. LDL 62. .              8/26/2022: Chol 139. HDL 54. LDL 66. .              On atorvastatin 40 mg Q24.              Favorable lipid panel.     8. Hypertriglyceridemia              2016: Diagnosed.              As above.     9. Diabetes Mellitus, Type 2              2018: Diagnosed. Complications: CVA. Medications: Oral agent.     6/8/2023: Empagliflozin 10 mg Q24  was begun for HF.               On metformin 500 mg Q12 and empagliflozin 10 mg Q24.              Tells me well controlled.     10. History of Overweight              6/3/2021: Weight 86 kg. BMI 26.   10/18/2023: Weight 82 kg. BMI 25.      11. Primary Care              Dr. Jigar Durant.    F/u 4 months.    Kevin Ham M.D.

## 2024-01-04 DIAGNOSIS — I50.22 HEART FAILURE, SYSTOLIC, CHRONIC: ICD-10-CM

## 2024-01-09 DIAGNOSIS — I50.22 HEART FAILURE, SYSTOLIC, CHRONIC: ICD-10-CM

## 2024-01-09 NOTE — TELEPHONE ENCOUNTER
----- Message from Malia Izquierdo sent at 1/9/2024  3:23 PM CST -----  Name of Who is Calling: HAL READ JR. [[id number]]            What is the request in detail: Patient is requesting call back to ask if he is getting off empagliflozin (JARDIANCE) 10 mg tablet     Being pharmacy mentioned it has been discontinued           Can the clinic reply by [facility]: no              What Number to Call Back if not in [facility]: [phone]

## 2024-01-09 NOTE — TELEPHONE ENCOUNTER
----- Message from Malia Izquierdo sent at 1/9/2024  3:23 PM CST -----  Name of Who is Calling: HAL READ JR. [[id number]]            What is the request in detail: Patient is requesting call back to ask if he is getting off empagliflozin (JARDIANCE) 10 mg tablet     Being pharmacy mentioned it has been discontinued           Can the clinic reply by [facility]: no              What Number to Call Back if not in [facility]: [phone]

## 2024-03-28 DIAGNOSIS — I10 PRIMARY HYPERTENSION: ICD-10-CM

## 2024-03-28 RX ORDER — AMLODIPINE BESYLATE 10 MG/1
10 TABLET ORAL
Qty: 90 TABLET | Refills: 3 | OUTPATIENT
Start: 2024-03-28

## 2024-04-29 ENCOUNTER — OFFICE VISIT (OUTPATIENT)
Dept: CARDIOLOGY | Facility: CLINIC | Age: 74
End: 2024-04-29
Attending: INTERNAL MEDICINE
Payer: MEDICARE

## 2024-04-29 VITALS
SYSTOLIC BLOOD PRESSURE: 122 MMHG | DIASTOLIC BLOOD PRESSURE: 76 MMHG | HEIGHT: 72 IN | WEIGHT: 185.63 LBS | OXYGEN SATURATION: 96 % | HEART RATE: 74 BPM | BODY MASS INDEX: 25.14 KG/M2

## 2024-04-29 DIAGNOSIS — E11.59 TYPE 2 DIABETES MELLITUS WITH OTHER CIRCULATORY COMPLICATION, WITHOUT LONG-TERM CURRENT USE OF INSULIN: ICD-10-CM

## 2024-04-29 DIAGNOSIS — I10 PRIMARY HYPERTENSION: ICD-10-CM

## 2024-04-29 DIAGNOSIS — Z86.73 HISTORY OF CEREBROVASCULAR ACCIDENT: ICD-10-CM

## 2024-04-29 DIAGNOSIS — I50.22 HEART FAILURE, SYSTOLIC, CHRONIC: ICD-10-CM

## 2024-04-29 DIAGNOSIS — E78.00 HYPERCHOLESTEROLEMIA: ICD-10-CM

## 2024-04-29 DIAGNOSIS — E78.1 HYPERTRIGLYCERIDEMIA: ICD-10-CM

## 2024-04-29 DIAGNOSIS — I25.10 CORONARY ARTERY DISEASE INVOLVING NATIVE CORONARY ARTERY OF NATIVE HEART WITHOUT ANGINA PECTORIS: ICD-10-CM

## 2024-04-29 DIAGNOSIS — R07.2 PRECORDIAL PAIN: ICD-10-CM

## 2024-04-29 DIAGNOSIS — I44.7 LEFT BUNDLE BRANCH BLOCK: ICD-10-CM

## 2024-04-29 PROCEDURE — 99999 PR PBB SHADOW E&M-EST. PATIENT-LVL III: CPT | Mod: PBBFAC,,, | Performed by: INTERNAL MEDICINE

## 2024-04-29 PROCEDURE — 99213 OFFICE O/P EST LOW 20 MIN: CPT | Mod: PBBFAC | Performed by: INTERNAL MEDICINE

## 2024-04-29 PROCEDURE — 99214 OFFICE O/P EST MOD 30 MIN: CPT | Mod: S$PBB,,, | Performed by: INTERNAL MEDICINE

## 2024-04-29 RX ORDER — BENAZEPRIL HYDROCHLORIDE 5 MG/1
5 TABLET ORAL DAILY
Qty: 90 TABLET | Refills: 3 | Status: SHIPPED | OUTPATIENT
Start: 2024-04-29 | End: 2024-05-08

## 2024-04-29 RX ORDER — SPIRONOLACTONE 25 MG/1
25 TABLET ORAL DAILY
Qty: 90 TABLET | Refills: 3 | Status: SHIPPED | OUTPATIENT
Start: 2024-04-29

## 2024-04-29 RX ORDER — ASPIRIN 81 MG/1
81 TABLET ORAL DAILY
Qty: 90 TABLET | Refills: 3 | Status: SHIPPED | OUTPATIENT
Start: 2024-04-29

## 2024-04-29 RX ORDER — ATORVASTATIN CALCIUM 40 MG/1
40 TABLET, FILM COATED ORAL DAILY
Qty: 90 TABLET | Refills: 3 | Status: SHIPPED | OUTPATIENT
Start: 2024-04-29

## 2024-04-29 RX ORDER — CARVEDILOL 3.12 MG/1
3.12 TABLET ORAL 2 TIMES DAILY
Qty: 180 TABLET | Refills: 3 | Status: SHIPPED | OUTPATIENT
Start: 2024-04-29

## 2024-04-29 NOTE — PROGRESS NOTES
Subjective:     Calderon Burt Jr. is a 73 y.o. male with hypertension, hypercholesterolemia, hypertriglyceridemia and diabetes mellitus type 2. He has a healthy weight but used to be overweight. In 12/2019 he suffered a thalamic cerebrovascular accident. He presented with weakness in the left arm. In 4/2021 he began to experience a left sided chest discomfort. The pain came on when he was moving furniture at home. He was noted to have a left bundle branch block. On 6/14/2021 he had an echocardiogram that revealed normal left ventricular size with low normal systolic function with an ejection fraction of 55%. The septum contracted as with left bundle branch block. There was mild diastolic dysfunction. On 6/14/2021 he had a regadenoson MPI that revealed normal perfusion. The ejection fraction was 63%. Accordingly, there was nothing to suggest obstructive coronary artery disease. It appeared the chest pain most certainly was non cardiac in origin. He has not had any chest pain since. On 6/2/2023 he had an echocardiogram that revealed normal left ventricular size with moderate to severe systolic dysfunction. There was dyssynchrony as with left bundle branch block. The ejection fraction was 30%. There was mild mitral regurgitation. With a significant decrease in systolic dysfunction in the setting of several risk factors for vascular disease he was referred for coronary angiography. On 6/13/202 he underwent coronary angiography. The left main was very short with moderate calcification. The left anterior descending coronary artery had an osteal 40% lesion and proximal 30% disease. The first diagonal  branch had osteal 70% disease. The left circumflex coronary artery had osteal 30% disease. The right coronary artery was dominant with distal 30% disease. He will be treated medically for his coronary artery disease. On 11/5/2023 he felt sharp left shoulder and back pain. The pain got worse when moving his left arm. The chest  pain later resolved. He walks two laps in [facility] a few times a week. No exertional shortness of breath. No palpitations or weak spells. No issues with any of his prescribed medications. Feeling well.        Chest Pain   This is a chronic problem. The current episode started more than 1 year ago. The problem has been resolved. Associated symptoms include back pain. Pertinent negatives include no abdominal pain, claudication, cough, diaphoresis, dizziness, exertional chest pressure, fever, headaches, hemoptysis, irregular heartbeat, leg pain, lower extremity edema, malaise/fatigue, nausea, near-syncope, numbness, orthopnea, palpitations, PND, shortness of breath, sputum production, syncope, vomiting or weakness.   His past medical history is significant for CAD, CHF, diabetes and hyperlipidemia.   Pertinent negatives for past medical history include no muscle weakness.   Cerebrovascular Accident  This is a chronic problem. Associated symptoms include chest pain and neck pain. Pertinent negatives include no abdominal pain, anorexia, arthralgias, change in bowel habit, chills, congestion, coughing, diaphoresis, fatigue, fever, headaches, joint swelling, myalgias, nausea, numbness, rash, sore throat, swollen glands, urinary symptoms, vertigo, visual change, vomiting or weakness.   Hypertension  This is a chronic problem. The current episode started more than 1 year ago. The problem is controlled (usually 125-130/75-80 mmHg at home). Associated symptoms include chest pain and neck pain. Pertinent negatives include no anxiety, blurred vision, headaches, malaise/fatigue, orthopnea, palpitations, peripheral edema, PND, shortness of breath or sweats. There is no history of chronic renal disease.   Hyperlipidemia  This is a chronic problem. The current episode started more than 1 year ago. Recent lipid tests were reviewed and are normal. Exacerbating diseases include diabetes. He has no history of chronic renal disease,  hypothyroidism, liver disease, obesity or nephrotic syndrome. Associated symptoms include chest pain. Pertinent negatives include no focal sensory loss, focal weakness, leg pain, myalgias or shortness of breath.   Congestive Heart Failure  Presents for follow-up visit. Associated symptoms include chest pain. Pertinent negatives include no abdominal pain, chest pressure, claudication, edema, fatigue, muscle weakness, near-syncope, nocturia, orthopnea, palpitations, paroxysmal nocturnal dyspnea or shortness of breath. His past medical history is significant for CAD.   Coronary Artery Disease  Presents for follow-up visit. Symptoms include chest pain. Pertinent negatives include no chest pressure, chest tightness, dizziness, leg swelling, muscle weakness, palpitations, shortness of breath or weight gain. Risk factors include hyperlipidemia. Risk factors do not include obesity. His past medical history is significant for CHF. The symptoms have been stable.       Review of Systems   Constitutional: Negative for chills, diaphoresis, fatigue, fever, malaise/fatigue and weight gain.   HENT:  Negative for congestion, nosebleeds and sore throat.    Eyes:  Negative for blurred vision, double vision, vision loss in left eye and vision loss in right eye.   Cardiovascular:  Positive for chest pain. Negative for claudication, dyspnea on exertion, irregular heartbeat, leg swelling, near-syncope, orthopnea, palpitations, paroxysmal nocturnal dyspnea and syncope.   Respiratory:  Negative for chest tightness, cough, hemoptysis, shortness of breath, sputum production and wheezing.    Endocrine: Negative for cold intolerance and heat intolerance.   Hematologic/Lymphatic: Negative for bleeding problem. Does not bruise/bleed easily.   Skin:  Negative for color change and rash.   Musculoskeletal:  Positive for back pain, joint pain (left shoulder) and neck pain. Negative for arthralgias, falls, joint swelling, muscle weakness and myalgias.    Gastrointestinal:  Negative for abdominal pain, anorexia, change in bowel habit, heartburn, hematemesis, hematochezia, hemorrhoids, jaundice, melena, nausea and vomiting.   Genitourinary:  Negative for dysuria, hematuria and nocturia.   Neurological:  Negative for dizziness, focal weakness, headaches, light-headedness, loss of balance, numbness, tremors, vertigo and weakness.   Psychiatric/Behavioral:  Negative for altered mental status, depression and memory loss. The patient is not nervous/anxious.    Allergic/Immunologic: Negative for hives and persistent infections.       Current Outpatient Medications on File Prior to Visit   Medication Sig Dispense Refill    aspirin (ECOTRIN) 81 MG EC tablet Take 1 tablet (81 mg total) by mouth once daily. 90 tablet 3    atorvastatin (LIPITOR) 40 MG tablet Take 1 tablet (40 mg total) by mouth once daily. 90 tablet 3    benazepriL (LOTENSIN) 5 MG tablet Take 1 tablet (5 mg total) by mouth once daily. 90 tablet 3    bimatoprost (LUMIGAN) 0.01 % Drop Instill 1 drop into both eyes every evening 7.5 mL 4    brimonidine-timoloL (COMBIGAN) 0.2-0.5 % Drop Instill 1 drop Both Eyes twice a day 10 mL 3    carvediloL (COREG) 3.125 MG tablet Take 1 tablet (3.125 mg total) by mouth 2 (two) times daily. 180 tablet 3    empagliflozin (JARDIANCE) 10 mg tablet Take 1 tablet (10 mg total) by mouth once daily. 30 tablet 9    metFORMIN (GLUMETZA) 1000 MG (MOD) 24 hr tablet Take 1,000 mg by mouth 2 (two) times daily with meals.      psyllium (METAMUCIL) powder Take 1 packet by mouth once daily. Pt takes about twice a week      spironolactone (ALDACTONE) 25 MG tablet Take 1 tablet (25 mg total) by mouth once daily. 90 tablet 3    tamsulosin (FLOMAX) 0.4 mg Cap Take 0.4 mg by mouth once daily.      bimatoprost (LUMIGAN) 0.01 % Drop 1 drop every evening. (Patient not taking: Reported on 4/29/2024)      brimonidine-timoloL (COMBIGAN) 0.2-0.5 % Drop Place 1 drop into both eyes. (Patient not taking:  Reported on 4/29/2024)      empagliflozin (JARDIANCE) 10 mg tablet Take 1 tablet (10 mg total) by mouth once daily. (Patient not taking: Reported on 4/29/2024) 90 tablet 3     No current facility-administered medications on file prior to visit.       /76   Pulse 74   Ht 6' (1.829 m)   Wt 84.2 kg (185 lb 10 oz)   SpO2 96%   BMI 25.18 kg/m²     Objective:     Physical Exam  Constitutional:       General: He is not in acute distress.     Appearance: Normal appearance. He is well-developed. He is not toxic-appearing or diaphoretic.   HENT:      Head: Normocephalic and atraumatic.      Nose: Nose normal.   Eyes:      General:         Right eye: No discharge.         Left eye: No discharge.      Conjunctiva/sclera:      Right eye: Right conjunctiva is not injected.      Left eye: Left conjunctiva is not injected.      Pupils: Pupils are equal.      Right eye: Pupil is round.      Left eye: Pupil is round.   Neck:      Thyroid: No thyromegaly.      Vascular: No carotid bruit or JVD.   Cardiovascular:      Rate and Rhythm: Normal rate and regular rhythm. No extrasystoles are present.     Chest Wall: PMI is not displaced.      Pulses:           Radial pulses are 2+ on the right side and 2+ on the left side.        Femoral pulses are 2+ on the right side and 2+ on the left side.       Dorsalis pedis pulses are 2+ on the right side and 2+ on the left side.        Posterior tibial pulses are 2+ on the right side and 2+ on the left side.      Heart sounds: S1 normal and S2 normal. No murmur heard.     Gallop present. S4 sounds present. No S3 sounds.   Pulmonary:      Effort: Pulmonary effort is normal.      Breath sounds: Normal breath sounds.   Chest:      Chest wall: No swelling or tenderness.   Abdominal:      Palpations: Abdomen is soft.      Tenderness: There is no abdominal tenderness.   Musculoskeletal:      Cervical back: Neck supple.      Right ankle: No swelling, deformity or ecchymosis.      Left ankle: No  swelling, deformity or ecchymosis.   Lymphadenopathy:      Head:      Right side of head: No submandibular adenopathy.      Left side of head: No submandibular adenopathy.      Cervical: No cervical adenopathy.   Skin:     General: Skin is warm and dry.      Findings: No rash.      Nails: There is no clubbing.      Comments: Mass consistent with lipoma right upper back.   Neurological:      General: No focal deficit present.      Mental Status: He is alert and oriented to person, place, and time. He is not disoriented.      Cranial Nerves: No cranial nerve deficit.   Psychiatric:         Attention and Perception: Attention and perception normal.         Mood and Affect: Mood and affect normal.         Speech: Speech normal.         Behavior: Behavior normal.         Thought Content: Thought content normal.         Cognition and Memory: Cognition and memory normal.         Judgment: Judgment normal.       Assessment:     1. Heart failure, systolic, chronic    2. Left bundle branch block    3. Coronary artery disease involving native coronary artery of native heart without angina pectoris    4. Precordial pain    5. History of cerebrovascular accident    6. Primary hypertension    7. Hypercholesterolemia    8. Hypertriglyceridemia    9. Type 2 diabetes mellitus with other circulatory complication, without long-term current use of insulin          Plan:     Heart Failure, Systolic, Chronic              6/2/2023: Echo: Normal left ventricular size with moderate to severe systolic dysfunction. LBBB. EF 30%. Mild MR.   12/6/2023: Echo: Normal left ventricular size with severe systolic dysfunction. Anteroseptal wall and apex severely hypokinetic. Remainder moderately hypokinetic. LBBB. EF 30%. Mild diastolic dysfunction   6/8/2023: Carvedilol 6.25 mg Q12 and empagliflozin 10 mg Q24 were begun.    7/7/2023: Spironolactone 25 mg Q24 was begun.   10/18/2023: Carvedilol 6.25 mg Q12 was reduced to carvedilol 3.125 mg Q12 and  benazepril 20 mg Q24 was reduced to benazepril 5 mg Q24 as blood pressure on low side.  On carvedilol 3.125 mg Q12, empagliflozin 10 mg Q24, benazepril 5 mg Q24 and spironolactone 25 mg Q24.  Possible CRT long term but QRS quite narrow.  12/2024: Plan next Echo.     2. Left Bundle Branch Block              2021: Diagnosed.              6/8/2023: SR. LBBB 134 ms.    11/6/2023: SR. LBBB.  ms.   He has LBBB but QRS only 136 ms.     3. Coronary Artery Disease  6/13/2023: [facility]: Cor: LM: Very short. Moderate calcification. LAD: Osteal 40%. Prox 30%. D1 osteal 70%. LCX: Osteal 30%. RCA: Dominant. Distal 30%.   Medical therapy.  On atorvastatin 40 mg Q24.  On aspirin 81 mg Q24.     4. Chest Pain              4/2021: Began experience chest pain.              12/17/2019: Echo: Normal left ventricular size and systolic function.              6/14/2021: Echo: Normal left ventricular size with low normal systolic function. EF 55%. LBBB. Mild diastolic dysfunction.               6/14/2021: Regadenoson MPI: Normal perfusion. EF 63%.   11/5/2023: Atypical left shoulder and chest pain.              Chest pain is most certainly non-cardiac in origin.              Reassurance.   Resolved.                5. History of Cerebrovascular Accident              12/2019: Thalamic CVA. Left arm weakness.              All risk factors are addressed.              On aspirin 81 mg Q24.                6. Hypertension              2016: Diagnosed.               7/12/2021: Amlodipine 10 mg Q24 was begun as running high.    7/7/2023: Spironolactone 25 mg Q24 was begun and amlodipine 10 mg Q24 was discontinued.    On carvedilol 3.125 mg Q12, benazepril 5 mg Q24 and spironolactone 25 mg Q24.              Keeping log at home.              Well controlled.                7. Hypercholesterolemia              2016: Began statin.               On atorvastatin 40 mg Q24.              12/16/2019: Chol 153. HDL 46. LDL 62. .               8/26/2022: Chol 139. HDL 54. LDL 66. .              On atorvastatin 40 mg Q24.              Very favorable lipid panel.     8. Hypertriglyceridemia              2016: Diagnosed.              As above.     9. Diabetes Mellitus, Type 2              2018: Diagnosed. Complications: CVA. Medications: Oral agent.     6/8/2023: Empagliflozin 10 mg Q24 was begun for HF.               On metformin 500 mg Q12 and empagliflozin 10 mg Q24.              Tells me well controlled.     10. History of Overweight              6/3/2021: Weight 86 kg. BMI 26.   10/18/2023: Weight 82 kg. BMI 25.      11. Primary Care              Dr. Jigar Durant.    F/u 4 months.    Kevin Ham M.D.

## 2024-05-06 ENCOUNTER — TELEPHONE (OUTPATIENT)
Dept: CARDIOLOGY | Facility: CLINIC | Age: 74
End: 2024-05-06
Payer: MEDICARE

## 2024-05-06 NOTE — TELEPHONE ENCOUNTER
----- Message from Ashanti Zepeda sent at 5/6/2024 10:51 AM CDT -----  Who called: Liliana galarza        What is the request in detail: pt having issues with cough and weak       Can the clinic reply by [facility]? No        Would the patient rather a call back or a response via [facility]? Call back        Best call back number:[phone]

## 2024-05-07 ENCOUNTER — TELEPHONE (OUTPATIENT)
Dept: CARDIOLOGY | Facility: CLINIC | Age: 74
End: 2024-05-07
Payer: MEDICARE

## 2024-05-07 NOTE — TELEPHONE ENCOUNTER
----- Message from Perla Zapata sent at 5/7/2024 10:44 AM CDT -----  Name of Who is Calling: Friend Natalie is calling on behalf of    HAL READ JR. [[id number]]                 What is the request in detail: Pt is requesting a call back regarding a few issues that the doctor need to be aware of. Please assist.                     Can the clinic reply by [facility]: No                    What Number to Call Back if not in [facility]: [phone]

## 2024-05-08 ENCOUNTER — OFFICE VISIT (OUTPATIENT)
Dept: CARDIOLOGY | Facility: CLINIC | Age: 74
End: 2024-05-08
Attending: INTERNAL MEDICINE
Payer: MEDICARE

## 2024-05-08 VITALS
HEART RATE: 73 BPM | WEIGHT: 182.88 LBS | BODY MASS INDEX: 24.77 KG/M2 | HEIGHT: 72 IN | OXYGEN SATURATION: 98 % | DIASTOLIC BLOOD PRESSURE: 65 MMHG | SYSTOLIC BLOOD PRESSURE: 102 MMHG

## 2024-05-08 DIAGNOSIS — Z86.73 HISTORY OF CEREBROVASCULAR ACCIDENT: ICD-10-CM

## 2024-05-08 DIAGNOSIS — E78.00 HYPERCHOLESTEROLEMIA: ICD-10-CM

## 2024-05-08 DIAGNOSIS — I44.7 LEFT BUNDLE BRANCH BLOCK: ICD-10-CM

## 2024-05-08 DIAGNOSIS — I25.10 CORONARY ARTERY DISEASE INVOLVING NATIVE CORONARY ARTERY OF NATIVE HEART WITHOUT ANGINA PECTORIS: ICD-10-CM

## 2024-05-08 DIAGNOSIS — E11.59 TYPE 2 DIABETES MELLITUS WITH OTHER CIRCULATORY COMPLICATION, WITHOUT LONG-TERM CURRENT USE OF INSULIN: ICD-10-CM

## 2024-05-08 DIAGNOSIS — I10 PRIMARY HYPERTENSION: ICD-10-CM

## 2024-05-08 DIAGNOSIS — R07.2 PRECORDIAL PAIN: ICD-10-CM

## 2024-05-08 DIAGNOSIS — I50.22 HEART FAILURE, SYSTOLIC, CHRONIC: ICD-10-CM

## 2024-05-08 DIAGNOSIS — E78.1 HYPERTRIGLYCERIDEMIA: ICD-10-CM

## 2024-05-08 PROCEDURE — 99214 OFFICE O/P EST MOD 30 MIN: CPT | Mod: S$PBB,,, | Performed by: INTERNAL MEDICINE

## 2024-05-08 PROCEDURE — 99213 OFFICE O/P EST LOW 20 MIN: CPT | Mod: PBBFAC | Performed by: INTERNAL MEDICINE

## 2024-05-08 PROCEDURE — 99999 PR PBB SHADOW E&M-EST. PATIENT-LVL III: CPT | Mod: PBBFAC,,, | Performed by: INTERNAL MEDICINE

## 2024-05-08 RX ORDER — BENAZEPRIL HYDROCHLORIDE 5 MG/1
5 TABLET ORAL DAILY
Qty: 30 TABLET | Refills: 3 | Status: SHIPPED | OUTPATIENT
Start: 2024-05-08

## 2024-05-08 NOTE — PROGRESS NOTES
Subjective:     Calderon Burt Jr. is a 73 y.o. male with hypertension, hypercholesterolemia, hypertriglyceridemia and diabetes mellitus type 2. He has a healthy weight but used to be overweight. In 12/2019 he suffered a thalamic cerebrovascular accident. He presented with weakness in the left arm. In 4/2021 he began to experience a left sided chest discomfort. The pain came on when he was moving furniture at home. He was noted to have a left bundle branch block. On 6/14/2021 he had an echocardiogram that revealed normal left ventricular size with low normal systolic function with an ejection fraction of 55%. The septum contracted as with left bundle branch block. There was mild diastolic dysfunction. On 6/14/2021 he had a regadenoson MPI that revealed normal perfusion. The ejection fraction was 63%. Accordingly, there was nothing to suggest obstructive coronary artery disease. It appeared the chest pain most certainly was non cardiac in origin. He has not had any chest pain since. On 6/2/2023 he had an echocardiogram that revealed normal left ventricular size with moderate to severe systolic dysfunction. There was dyssynchrony as with left bundle branch block. The ejection fraction was 30%. There was mild mitral regurgitation. With a significant decrease in systolic dysfunction in the setting of several risk factors for vascular disease he was referred for coronary angiography. On 6/13/202 he underwent coronary angiography. The left main was very short with moderate calcification. The left anterior descending coronary artery had an osteal 40% lesion and proximal 30% disease. The first diagonal  branch had osteal 70% disease. The left circumflex coronary artery had osteal 30% disease. The right coronary artery was dominant with distal 30% disease. It was felt he should be treated medically for his coronary artery disease. On 11/5/2023 he felt sharp left shoulder and back pain. The pain got worse when moving his left  arm. The chest pain later resolved. He walks two laps in [place] a few times a week. On 5/4/2024 he had nausea and vomited once. In 5/2024 he is bothered by a dry cough. No exertional shortness of breath. No palpitations or weak spells. No issues with any of his prescribed medications. Feeling overall well.        Chest Pain   This is a chronic problem. The current episode started more than 1 year ago. The problem has been resolved. Associated symptoms include back pain and a cough. Pertinent negatives include no abdominal pain, claudication, diaphoresis, dizziness, exertional chest pressure, fever, headaches, hemoptysis, irregular heartbeat, leg pain, lower extremity edema, malaise/fatigue, nausea, near-syncope, numbness, orthopnea, palpitations, PND, shortness of breath, sputum production, syncope, vomiting or weakness.   His past medical history is significant for CAD, CHF, diabetes and hyperlipidemia.   Pertinent negatives for past medical history include no muscle weakness.   Cerebrovascular Accident  This is a chronic problem. Associated symptoms include chest pain, coughing and neck pain. Pertinent negatives include no abdominal pain, anorexia, arthralgias, change in bowel habit, chills, congestion, diaphoresis, fatigue, fever, headaches, joint swelling, myalgias, nausea, numbness, rash, sore throat, swollen glands, urinary symptoms, vertigo, visual change, vomiting or weakness.   Hypertension  This is a chronic problem. The current episode started more than 1 year ago. The problem is controlled (usually 125-130/75-80 mmHg at home). Associated symptoms include chest pain and neck pain. Pertinent negatives include no anxiety, blurred vision, headaches, malaise/fatigue, orthopnea, palpitations, peripheral edema, PND, shortness of breath or sweats. There is no history of chronic renal disease.   Hyperlipidemia  This is a chronic problem. The current episode started more than 1 year ago. Recent lipid tests  were reviewed and are normal. Exacerbating diseases include diabetes. He has no history of chronic renal disease, hypothyroidism, liver disease, obesity or nephrotic syndrome. Associated symptoms include chest pain. Pertinent negatives include no focal sensory loss, focal weakness, leg pain, myalgias or shortness of breath.   Congestive Heart Failure  Presents for follow-up visit. Associated symptoms include chest pain. Pertinent negatives include no abdominal pain, chest pressure, claudication, edema, fatigue, muscle weakness, near-syncope, nocturia, orthopnea, palpitations, paroxysmal nocturnal dyspnea or shortness of breath. His past medical history is significant for CAD.   Coronary Artery Disease  Presents for follow-up visit. Symptoms include chest pain. Pertinent negatives include no chest pressure, chest tightness, dizziness, leg swelling, muscle weakness, palpitations, shortness of breath or weight gain. Risk factors include hyperlipidemia. Risk factors do not include obesity. His past medical history is significant for CHF. The symptoms have been stable.       Review of Systems   Constitutional: Negative for chills, diaphoresis, fatigue, fever, malaise/fatigue and weight gain.   HENT:  Negative for congestion, nosebleeds and sore throat.    Eyes:  Negative for blurred vision, double vision, vision loss in left eye and vision loss in right eye.   Cardiovascular:  Positive for chest pain. Negative for claudication, dyspnea on exertion, irregular heartbeat, leg swelling, near-syncope, orthopnea, palpitations, paroxysmal nocturnal dyspnea and syncope.   Respiratory:  Positive for cough. Negative for chest tightness, hemoptysis, shortness of breath, sputum production and wheezing.    Endocrine: Negative for cold intolerance and heat intolerance.   Hematologic/Lymphatic: Negative for bleeding problem. Does not bruise/bleed easily.   Skin:  Negative for color change and rash.   Musculoskeletal:  Positive for back  pain, joint pain (left shoulder) and neck pain. Negative for arthralgias, falls, joint swelling, muscle weakness and myalgias.   Gastrointestinal:  Negative for abdominal pain, anorexia, change in bowel habit, heartburn, hematemesis, hematochezia, hemorrhoids, jaundice, melena, nausea and vomiting.   Genitourinary:  Negative for dysuria, hematuria and nocturia.   Neurological:  Negative for dizziness, focal weakness, headaches, light-headedness, loss of balance, numbness, tremors, vertigo and weakness.   Psychiatric/Behavioral:  Negative for altered mental status, depression and memory loss. The patient is not nervous/anxious.    Allergic/Immunologic: Negative for hives and persistent infections.       Current Outpatient Medications on File Prior to Visit   Medication Sig Dispense Refill    aspirin (ECOTRIN) 81 MG EC tablet Take 1 tablet (81 mg total) by mouth once daily. 90 tablet 3    atorvastatin (LIPITOR) 40 MG tablet Take 1 tablet (40 mg total) by mouth once daily. 90 tablet 3    benazepriL (LOTENSIN) 5 MG tablet Take 1 tablet (5 mg total) by mouth once daily. 90 tablet 3    bimatoprost (LUMIGAN) 0.01 % Drop 1 drop every evening.      brimonidine-timoloL (COMBIGAN) 0.2-0.5 % Drop Place 1 drop into both eyes.      carvediloL (COREG) 3.125 MG tablet Take 1 tablet (3.125 mg total) by mouth 2 (two) times daily. 180 tablet 3    empagliflozin (JARDIANCE) 10 mg tablet Take 1 tablet (10 mg total) by mouth once daily. 90 tablet 3    metFORMIN (GLUMETZA) 1000 MG (MOD) 24 hr tablet Take 1,000 mg by mouth 2 (two) times daily with meals.      psyllium (METAMUCIL) powder Take 1 packet by mouth once daily. Pt takes about twice a week      spironolactone (ALDACTONE) 25 MG tablet Take 1 tablet (25 mg total) by mouth once daily. 90 tablet 3    bimatoprost (LUMIGAN) 0.01 % Drop Instill 1 drop into both eyes every evening (Patient not taking: Reported on 5/8/2024) 7.5 mL 4    brimonidine-timoloL (COMBIGAN) 0.2-0.5 % Drop Instill 1  drop Both Eyes twice a day (Patient not taking: Reported on 5/8/2024) 10 mL 3    tamsulosin (FLOMAX) 0.4 mg Cap Take 0.4 mg by mouth once daily. (Patient not taking: Reported on 5/8/2024)       No current facility-administered medications on file prior to visit.       /65   Pulse 73   Ht 6' (1.829 m)   Wt 83 kg (182 lb 14 oz)   SpO2 98%   BMI 24.80 kg/m²     Objective:     Physical Exam  Constitutional:       General: He is not in acute distress.     Appearance: Normal appearance. He is well-developed. He is not toxic-appearing or diaphoretic.   HENT:      Head: Normocephalic and atraumatic.      Nose: Nose normal.   Eyes:      General:         Right eye: No discharge.         Left eye: No discharge.      Conjunctiva/sclera:      Right eye: Right conjunctiva is not injected.      Left eye: Left conjunctiva is not injected.      Pupils: Pupils are equal.      Right eye: Pupil is round.      Left eye: Pupil is round.   Neck:      Thyroid: No thyromegaly.      Vascular: No carotid bruit or JVD.   Cardiovascular:      Rate and Rhythm: Normal rate and regular rhythm. No extrasystoles are present.     Chest Wall: PMI is not displaced.      Pulses:           Radial pulses are 2+ on the right side and 2+ on the left side.        Femoral pulses are 2+ on the right side and 2+ on the left side.       Dorsalis pedis pulses are 2+ on the right side and 2+ on the left side.        Posterior tibial pulses are 2+ on the right side and 2+ on the left side.      Heart sounds: S1 normal and S2 normal. No murmur heard.     Gallop present. S4 sounds present. No S3 sounds.   Pulmonary:      Effort: Pulmonary effort is normal.      Breath sounds: Normal breath sounds.   Chest:      Chest wall: No swelling or tenderness.   Abdominal:      Palpations: Abdomen is soft.      Tenderness: There is no abdominal tenderness.   Musculoskeletal:      Cervical back: Neck supple.      Right ankle: No swelling, deformity or ecchymosis.       Left ankle: No swelling, deformity or ecchymosis.   Lymphadenopathy:      Head:      Right side of head: No submandibular adenopathy.      Left side of head: No submandibular adenopathy.      Cervical: No cervical adenopathy.   Skin:     General: Skin is warm and dry.      Findings: No rash.      Nails: There is no clubbing.      Comments: Mass consistent with lipoma right upper back.   Neurological:      General: No focal deficit present.      Mental Status: He is alert and oriented to person, place, and time. He is not disoriented.      Cranial Nerves: No cranial nerve deficit.   Psychiatric:         Attention and Perception: Attention and perception normal.         Mood and Affect: Mood and affect normal.         Speech: Speech normal.         Behavior: Behavior normal.         Thought Content: Thought content normal.         Cognition and Memory: Cognition and memory normal.         Judgment: Judgment normal.       Assessment:     1. Heart failure, systolic, chronic    2. Left bundle branch block    3. Coronary artery disease involving native coronary artery of native heart without angina pectoris    4. Precordial pain    5. History of cerebrovascular accident    6. Primary hypertension    7. Hypercholesterolemia    8. Hypertriglyceridemia    9. Type 2 diabetes mellitus with other circulatory complication, without long-term current use of insulin          Plan:     Heart Failure, Systolic, Chronic              6/2/2023: Echo: Normal left ventricular size with moderate to severe systolic dysfunction. LBBB. EF 30%. Mild MR.   12/6/2023: Echo: Normal left ventricular size with severe systolic dysfunction. Anteroseptal wall and apex severely hypokinetic. Remainder moderately hypokinetic. LBBB. EF 30%. Mild diastolic dysfunction   6/8/2023: Carvedilol 6.25 mg Q12 and empagliflozin 10 mg Q24 were begun.    7/7/2023: Spironolactone 25 mg Q24 was begun.   10/18/2023: Carvedilol 6.25 mg Q12 was reduced to carvedilol  3.125 mg Q12 and benazepril 20 mg Q24 was reduced to benazepril 5 mg Q24 as blood pressure on low side.  On carvedilol 3.125 mg Q12, empagliflozin 10 mg Q24, benazepril 5 mg Q24 and spironolactone 25 mg Q24.  Possible CRT long term but QRS quite narrow.  5/8/2024: Possibly dry cough from ACEI. Benazepril 5 mg Q24 to be discontinued. In 2 days sacubitril 24 mg/valsartan 26 mg Q12 to begin.    12/2024: Plan next Echo.     2. Left Bundle Branch Block              2021: Diagnosed.              6/8/2023: SR. LBBB 134 ms.    11/6/2023: SR. LBBB.  ms.   He has LBBB but QRS only 136 ms.     3. Coronary Artery Disease  6/13/2023: [facility]: Cor: LM: Very short. Moderate calcification. LAD: Osteal 40%. Prox 30%. D1 osteal 70%. LCX: Osteal 30%. RCA: Dominant. Distal 30%.   Medical therapy.  On atorvastatin 40 mg Q24.  On aspirin 81 mg Q24.     4. Chest Pain              4/2021: Began experience chest pain.              12/17/2019: Echo: Normal left ventricular size and systolic function.              6/14/2021: Echo: Normal left ventricular size with low normal systolic function. EF 55%. LBBB. Mild diastolic dysfunction.               6/14/2021: Regadenoson MPI: Normal perfusion. EF 63%.   11/5/2023: Atypical left shoulder and chest pain.              Chest pain is most certainly non-cardiac in origin.              Reassurance.   Resolved.                5. History of Cerebrovascular Accident              12/2019: Thalamic CVA. Left arm weakness.              All risk factors are addressed.              On aspirin 81 mg Q24.                6. Hypertension              2016: Diagnosed.               7/12/2021: Amlodipine 10 mg Q24 was begun as running high.    7/7/2023: Spironolactone 25 mg Q24 was begun and amlodipine 10 mg Q24 was discontinued.    On carvedilol 3.125 mg Q12, benazepril 5 mg Q24 and spironolactone 25 mg Q24.              Keeping log at home.              Well controlled.   5/8/2024: As above.                 7. Hypercholesterolemia              2016: Began statin.               On atorvastatin 40 mg Q24.              12/16/2019: Chol 153. HDL 46. LDL 62. .              8/26/2022: Chol 139. HDL 54. LDL 66. .              On atorvastatin 40 mg Q24.              Very favorable lipid panel.     8. Hypertriglyceridemia              2016: Diagnosed.              As above.     9. Diabetes Mellitus, Type 2              2018: Diagnosed. Complications: CVA. Medications: Oral agent.     6/8/2023: Empagliflozin 10 mg Q24 was begun for HF.               On metformin 500 mg Q12 and empagliflozin 10 mg Q24.              Tells me well controlled.     10. History of Overweight              6/3/2021: Weight 86 kg. BMI 26.   10/18/2023: Weight 82 kg. BMI 25.      11. Primary Care              Dr. Jigar Durant.    F/u 2 months.    Kevin Ham M.D.

## 2024-07-08 ENCOUNTER — OFFICE VISIT (OUTPATIENT)
Dept: CARDIOLOGY | Facility: CLINIC | Age: 74
End: 2024-07-08
Attending: INTERNAL MEDICINE
Payer: MEDICARE

## 2024-07-08 VITALS
SYSTOLIC BLOOD PRESSURE: 112 MMHG | WEIGHT: 184.06 LBS | OXYGEN SATURATION: 97 % | HEART RATE: 98 BPM | DIASTOLIC BLOOD PRESSURE: 76 MMHG | BODY MASS INDEX: 24.93 KG/M2 | HEIGHT: 72 IN

## 2024-07-08 DIAGNOSIS — I50.22 HEART FAILURE, SYSTOLIC, CHRONIC: ICD-10-CM

## 2024-07-08 DIAGNOSIS — I10 PRIMARY HYPERTENSION: ICD-10-CM

## 2024-07-08 DIAGNOSIS — Z86.73 HISTORY OF CEREBROVASCULAR ACCIDENT: ICD-10-CM

## 2024-07-08 DIAGNOSIS — E11.59 TYPE 2 DIABETES MELLITUS WITH OTHER CIRCULATORY COMPLICATION, WITHOUT LONG-TERM CURRENT USE OF INSULIN: ICD-10-CM

## 2024-07-08 DIAGNOSIS — I44.7 LEFT BUNDLE BRANCH BLOCK: ICD-10-CM

## 2024-07-08 DIAGNOSIS — Z87.898 HISTORY OF CHEST PAIN: ICD-10-CM

## 2024-07-08 DIAGNOSIS — I25.10 CORONARY ARTERY DISEASE INVOLVING NATIVE CORONARY ARTERY OF NATIVE HEART WITHOUT ANGINA PECTORIS: ICD-10-CM

## 2024-07-08 DIAGNOSIS — E78.1 HYPERTRIGLYCERIDEMIA: ICD-10-CM

## 2024-07-08 DIAGNOSIS — E78.00 HYPERCHOLESTEROLEMIA: ICD-10-CM

## 2024-07-08 PROCEDURE — 99214 OFFICE O/P EST MOD 30 MIN: CPT | Mod: S$PBB,,, | Performed by: INTERNAL MEDICINE

## 2024-07-08 PROCEDURE — 99999 PR PBB SHADOW E&M-EST. PATIENT-LVL III: CPT | Mod: PBBFAC,,, | Performed by: INTERNAL MEDICINE

## 2024-07-08 PROCEDURE — 99213 OFFICE O/P EST LOW 20 MIN: CPT | Mod: PBBFAC | Performed by: INTERNAL MEDICINE

## 2024-07-08 NOTE — PROGRESS NOTES
Subjective:     Calderon Burt Jr. is a 73 y.o. male with hypertension, hypercholesterolemia, hypertriglyceridemia and diabetes mellitus type 2. He has a healthy weight but used to be overweight. In 12/2019 he suffered a thalamic cerebrovascular accident. He presented with weakness in the left arm. In 4/2021 he began to experience a left sided chest discomfort. The pain came on when he was moving furniture at home. He was noted to have a left bundle branch block. On 6/14/2021 he had an echocardiogram that revealed normal left ventricular size with low normal systolic function with an ejection fraction of 55%. The septum contracted as with left bundle branch block. There was mild diastolic dysfunction. On 6/14/2021 he had a regadenoson MPI that revealed normal perfusion. The ejection fraction was 63%. Accordingly, there was nothing to suggest obstructive coronary artery disease. It appeared the chest pain most certainly was non cardiac in origin. He has not had any chest pain since. On 6/2/2023 he had an echocardiogram that revealed normal left ventricular size with moderate to severe systolic dysfunction. There was dyssynchrony as with left bundle branch block. The ejection fraction was 30%. There was mild mitral regurgitation. With a significant decrease in systolic dysfunction in the setting of several risk factors for vascular disease he was referred for coronary angiography. On 6/13/202 he underwent coronary angiography. The left main was very short with moderate calcification. The left anterior descending coronary artery had an osteal 40% lesion and proximal 30% disease. The first diagonal  branch had osteal 70% disease. The left circumflex coronary artery had osteal 30% disease. The right coronary artery was dominant with distal 30% disease. It was felt he should be treated medically for his coronary artery disease. On 11/5/2023 he felt sharp left shoulder and back pain. The pain got worse when moving his left  arm. The chest pain later resolved. He walks two laps in [employer] a few times a week. On 5/4/2024 he had nausea and vomited once. In 5/2024 he was bothered by a dry cough. No exertional shortness of breath. No palpitations or weak spells. No issues with any of his prescribed medications. Feeling overall well.        Chest Pain   This is a chronic problem. The current episode started more than 1 year ago. The problem has been resolved. Associated symptoms include back pain and a cough. Pertinent negatives include no abdominal pain, claudication, diaphoresis, dizziness, exertional chest pressure, fever, headaches, hemoptysis, irregular heartbeat, leg pain, lower extremity edema, malaise/fatigue, nausea, near-syncope, numbness, orthopnea, palpitations, PND, shortness of breath, sputum production, syncope, vomiting or weakness.   His past medical history is significant for CAD, CHF, diabetes and hyperlipidemia.   Pertinent negatives for past medical history include no muscle weakness.   Cerebrovascular Accident  This is a chronic problem. Associated symptoms include chest pain, coughing and neck pain. Pertinent negatives include no abdominal pain, anorexia, arthralgias, change in bowel habit, chills, congestion, diaphoresis, fatigue, fever, headaches, joint swelling, myalgias, nausea, numbness, rash, sore throat, swollen glands, urinary symptoms, vertigo, visual change, vomiting or weakness.   Hypertension  This is a chronic problem. The current episode started more than 1 year ago. The problem is controlled (usually 125-130/75-80 mmHg at home). Associated symptoms include chest pain and neck pain. Pertinent negatives include no anxiety, blurred vision, headaches, malaise/fatigue, orthopnea, palpitations, peripheral edema, PND, shortness of breath or sweats. There is no history of chronic renal disease.   Hyperlipidemia  This is a chronic problem. The current episode started more than 1 year ago. Recent lipid tests  were reviewed and are normal. Exacerbating diseases include diabetes. He has no history of chronic renal disease, hypothyroidism, liver disease, obesity or nephrotic syndrome. Associated symptoms include chest pain. Pertinent negatives include no focal sensory loss, focal weakness, leg pain, myalgias or shortness of breath.   Congestive Heart Failure  Presents for follow-up visit. Associated symptoms include chest pain. Pertinent negatives include no abdominal pain, chest pressure, claudication, edema, fatigue, muscle weakness, near-syncope, nocturia, orthopnea, palpitations, paroxysmal nocturnal dyspnea or shortness of breath. His past medical history is significant for CAD.   Coronary Artery Disease  Presents for follow-up visit. Symptoms include chest pain. Pertinent negatives include no chest pressure, chest tightness, dizziness, leg swelling, muscle weakness, palpitations, shortness of breath or weight gain. Risk factors include hyperlipidemia. Risk factors do not include obesity. His past medical history is significant for CHF. The symptoms have been stable.       Review of Systems   Constitutional: Negative for chills, diaphoresis, fatigue, fever, malaise/fatigue and weight gain.   HENT:  Negative for congestion, nosebleeds and sore throat.    Eyes:  Negative for blurred vision, double vision, vision loss in left eye and vision loss in right eye.   Cardiovascular:  Positive for chest pain. Negative for claudication, dyspnea on exertion, irregular heartbeat, leg swelling, near-syncope, orthopnea, palpitations, paroxysmal nocturnal dyspnea and syncope.   Respiratory:  Positive for cough. Negative for chest tightness, hemoptysis, shortness of breath, sputum production and wheezing.    Endocrine: Negative for cold intolerance and heat intolerance.   Hematologic/Lymphatic: Negative for bleeding problem. Does not bruise/bleed easily.   Skin:  Negative for color change and rash.   Musculoskeletal:  Positive for back  pain, joint pain (left shoulder) and neck pain. Negative for arthralgias, falls, joint swelling, muscle weakness and myalgias.   Gastrointestinal:  Negative for abdominal pain, anorexia, change in bowel habit, heartburn, hematemesis, hematochezia, hemorrhoids, jaundice, melena, nausea and vomiting.   Genitourinary:  Negative for dysuria, hematuria and nocturia.   Neurological:  Negative for dizziness, focal weakness, headaches, light-headedness, loss of balance, numbness, tremors, vertigo and weakness.   Psychiatric/Behavioral:  Negative for altered mental status, depression and memory loss. The patient is not nervous/anxious.    Allergic/Immunologic: Negative for hives and persistent infections.       Current Outpatient Medications on File Prior to Visit   Medication Sig Dispense Refill    aspirin (ECOTRIN) 81 MG EC tablet Take 1 tablet (81 mg total) by mouth once daily. 90 tablet 3    atorvastatin (LIPITOR) 40 MG tablet Take 1 tablet (40 mg total) by mouth once daily. 90 tablet 3    bimatoprost (LUMIGAN) 0.01 % Drop 1 drop every evening.      brimonidine-timoloL (COMBIGAN) 0.2-0.5 % Drop Place 1 drop into both eyes.      carvediloL (COREG) 3.125 MG tablet Take 1 tablet (3.125 mg total) by mouth 2 (two) times daily. 180 tablet 3    empagliflozin (JARDIANCE) 10 mg tablet Take 1 tablet (10 mg total) by mouth once daily. 90 tablet 3    metFORMIN (GLUMETZA) 1000 MG (MOD) 24 hr tablet Take 1,000 mg by mouth 2 (two) times daily with meals.      psyllium (METAMUCIL) powder Take 1 packet by mouth once daily. Pt takes about twice a week      sacubitriL-valsartan (ENTRESTO) 24-26 mg per tablet Take 1 tablet by mouth 2 (two) times daily. 60 tablet 11    spironolactone (ALDACTONE) 25 MG tablet Take 1 tablet (25 mg total) by mouth once daily. 90 tablet 3    benazepriL (LOTENSIN) 5 MG tablet Take 1 tablet (5 mg total) by mouth once daily. (Patient not taking: Reported on 7/8/2024) 30 tablet 3    bimatoprost (LUMIGAN) 0.01 % Drop  Instill 1 drop into both eyes every evening (Patient not taking: Reported on 5/8/2024) 7.5 mL 4    brimonidine-timoloL (COMBIGAN) 0.2-0.5 % Drop Instill 1 drop Both Eyes twice a day (Patient not taking: Reported on 5/8/2024) 10 mL 3    tamsulosin (FLOMAX) 0.4 mg Cap Take 0.4 mg by mouth once daily. (Patient not taking: Reported on 5/8/2024)       No current facility-administered medications on file prior to visit.       /76   Pulse 98   Ht 6' (1.829 m)   Wt 83.5 kg (184 lb 1.4 oz)   SpO2 97%   BMI 24.97 kg/m²     Objective:     Physical Exam  Constitutional:       General: He is not in acute distress.     Appearance: Normal appearance. He is well-developed. He is not toxic-appearing or diaphoretic.   HENT:      Head: Normocephalic and atraumatic.      Nose: Nose normal.   Eyes:      General:         Right eye: No discharge.         Left eye: No discharge.      Conjunctiva/sclera:      Right eye: Right conjunctiva is not injected.      Left eye: Left conjunctiva is not injected.      Pupils: Pupils are equal.      Right eye: Pupil is round.      Left eye: Pupil is round.   Neck:      Thyroid: No thyromegaly.      Vascular: No carotid bruit or JVD.   Cardiovascular:      Rate and Rhythm: Normal rate and regular rhythm. No extrasystoles are present.     Chest Wall: PMI is not displaced.      Pulses:           Radial pulses are 2+ on the right side and 2+ on the left side.        Femoral pulses are 2+ on the right side and 2+ on the left side.       Dorsalis pedis pulses are 2+ on the right side and 2+ on the left side.        Posterior tibial pulses are 2+ on the right side and 2+ on the left side.      Heart sounds: S1 normal and S2 normal. No murmur heard.     Gallop present. S4 sounds present. No S3 sounds.   Pulmonary:      Effort: Pulmonary effort is normal.      Breath sounds: Normal breath sounds.   Chest:      Chest wall: No swelling or tenderness.   Abdominal:      Palpations: Abdomen is soft.       Tenderness: There is no abdominal tenderness.   Musculoskeletal:      Cervical back: Neck supple.      Right ankle: No swelling, deformity or ecchymosis.      Left ankle: No swelling, deformity or ecchymosis.   Lymphadenopathy:      Head:      Right side of head: No submandibular adenopathy.      Left side of head: No submandibular adenopathy.      Cervical: No cervical adenopathy.   Skin:     General: Skin is warm and dry.      Findings: No rash.      Nails: There is no clubbing.      Comments: Mass consistent with lipoma right upper back.   Neurological:      General: No focal deficit present.      Mental Status: He is alert and oriented to person, place, and time. He is not disoriented.      Cranial Nerves: No cranial nerve deficit.   Psychiatric:         Attention and Perception: Attention and perception normal.         Mood and Affect: Mood and affect normal.         Speech: Speech normal.         Behavior: Behavior normal.         Thought Content: Thought content normal.         Cognition and Memory: Cognition and memory normal.         Judgment: Judgment normal.       Assessment:     1. Heart failure, systolic, chronic    2. Left bundle branch block    3. Coronary artery disease involving native coronary artery of native heart without angina pectoris    4. History of chest pain    5. History of cerebrovascular accident    6. Primary hypertension    7. Hypercholesterolemia    8. Hypertriglyceridemia    9. Type 2 diabetes mellitus with other circulatory complication, without long-term current use of insulin          Plan:     Heart Failure, Systolic, Chronic              6/2/2023: Echo: Normal left ventricular size with moderate to severe systolic dysfunction. LBBB. EF 30%. Mild MR.   12/6/2023: Echo: Normal left ventricular size with severe systolic dysfunction. Anteroseptal wall and apex severely hypokinetic. Remainder moderately hypokinetic. LBBB. EF 30%. Mild diastolic dysfunction   6/8/2023: Carvedilol  6.25 mg Q12 and empagliflozin 10 mg Q24 were begun.    7/7/2023: Spironolactone 25 mg Q24 was begun.   10/18/2023: Carvedilol 6.25 mg Q12 was reduced to carvedilol 3.125 mg Q12 and benazepril 20 mg Q24 was reduced to benazepril 5 mg Q24 as blood pressure on low side.   5/8/2024:Benazepril 5 mg Q24 was discontinued. In 2 days sacubitril 24 mg/valsartan 26 mg Q12 was begun as possibly dry cough from ACEI.   On carvedilol 3.125 mg Q12, empagliflozin 10 mg Q24, sacubitril 24 mg/valsartan 26 mg Q12 and spironolactone 25 mg Q24.  Possible CRT long term but QRS quite narrow.  Appears well compensated.  7/8/2024: Sacubitril 24 mg/valsartan 26 mg Q12 to be increased to sacubitril 49 mg/valsartan 51 mg Q12.  12/2024: Plan next Echo.     2. Left Bundle Branch Block              2021: Diagnosed.              6/8/2023: SR. LBBB 134 ms.    11/6/2023: SR. LBBB.  ms.   He has LBBB but QRS only 136 ms.     3. Coronary Artery Disease  6/13/2023: [facility]: Cor: LM: Very short. Moderate calcification. LAD: Osteal 40%. Prox 30%. D1 osteal 70%. LCX: Osteal 30%. RCA: Dominant. Distal 30%.   Medical therapy.  On atorvastatin 40 mg Q24.  On aspirin 81 mg Q24.     4. History of Chest Pain              4/2021: Began experience chest pain.              12/17/2019: Echo: Normal left ventricular size and systolic function.              6/14/2021: Echo: Normal left ventricular size with low normal systolic function. EF 55%. LBBB. Mild diastolic dysfunction.               6/14/2021: Regadenoson MPI: Normal perfusion. EF 63%.   11/5/2023: Atypical left shoulder and chest pain.              Chest pain is most certainly non-cardiac in origin.              Reassurance.   Resolved.                5. History of Cerebrovascular Accident              12/2019: Thalamic CVA. Left arm weakness.              All risk factors are addressed.              On aspirin 81 mg Q24.                6. Hypertension              2016: Diagnosed.                7/12/2021: Amlodipine 10 mg Q24 was begun as running high.    7/7/2023: Spironolactone 25 mg Q24 was begun and amlodipine 10 mg Q24 was discontinued.    On carvedilol 3.125 mg Q12, sacubitril 24 mg/valsartan 26 mg Q12 and spironolactone 25 mg Q24.              Keeping log at home.              Well controlled.                7. Hypercholesterolemia              2016: Began statin.               On atorvastatin 40 mg Q24.              12/16/2019: Chol 153. HDL 46. LDL 62. .              8/26/2022: Chol 139. HDL 54. LDL 66. .              On atorvastatin 40 mg Q24.              Very favorable lipid panel.     8. Hypertriglyceridemia              2016: Diagnosed.              As above.     9. Diabetes Mellitus, Type 2              2018: Diagnosed. Complications: CVA. Medications: Oral agent.     6/8/2023: Empagliflozin 10 mg Q24 was begun for HF.               On metformin 500 mg Q12 and empagliflozin 10 mg Q24.              Tells me well controlled.     10. History of Overweight              6/3/2021: Weight 86 kg. BMI 26.   10/18/2023: Weight 82 kg. BMI 25.      11. Primary Care              Dr. Jigar Durant.    F/u 2 months.    Kevin Ham M.D.

## 2024-07-30 ENCOUNTER — TELEPHONE (OUTPATIENT)
Dept: CARDIOLOGY | Facility: CLINIC | Age: 74
End: 2024-07-30
Payer: MEDICARE

## 2024-07-30 NOTE — TELEPHONE ENCOUNTER
Spoke to Natalie Sprague- advised to call PCP for advice.      ----- Message from Olena Capps sent at 7/30/2024  8:18 AM CDT -----  Name of Who is Calling:HAL READ JR. [[id number]]                   What is the request in detail:PT needs the nurse to call back PT has been having a bad cough and wants something called in please assist                     Can the clinic reply by [facility]: No                   What Number to Call Back if not in [facility]:  Natalie Álvarez [phone]

## 2024-08-27 ENCOUNTER — OFFICE VISIT (OUTPATIENT)
Dept: CARDIOLOGY | Facility: CLINIC | Age: 74
End: 2024-08-27
Attending: INTERNAL MEDICINE
Payer: MEDICARE

## 2024-08-27 DIAGNOSIS — E78.00 HYPERCHOLESTEROLEMIA: ICD-10-CM

## 2024-08-27 DIAGNOSIS — E78.1 HYPERTRIGLYCERIDEMIA: ICD-10-CM

## 2024-08-27 DIAGNOSIS — Z86.73 HISTORY OF CEREBROVASCULAR ACCIDENT: ICD-10-CM

## 2024-08-27 DIAGNOSIS — Z87.898 HISTORY OF CHEST PAIN: ICD-10-CM

## 2024-08-27 DIAGNOSIS — I50.22 HEART FAILURE, SYSTOLIC, CHRONIC: ICD-10-CM

## 2024-08-27 DIAGNOSIS — E11.59 TYPE 2 DIABETES MELLITUS WITH OTHER CIRCULATORY COMPLICATION, WITHOUT LONG-TERM CURRENT USE OF INSULIN: ICD-10-CM

## 2024-08-27 DIAGNOSIS — N18.31 STAGE 3A CHRONIC KIDNEY DISEASE: ICD-10-CM

## 2024-08-27 DIAGNOSIS — I25.10 CORONARY ARTERY DISEASE INVOLVING NATIVE CORONARY ARTERY OF NATIVE HEART WITHOUT ANGINA PECTORIS: ICD-10-CM

## 2024-08-27 DIAGNOSIS — I10 PRIMARY HYPERTENSION: ICD-10-CM

## 2024-08-27 DIAGNOSIS — I44.7 LEFT BUNDLE BRANCH BLOCK: ICD-10-CM

## 2024-08-27 PROBLEM — N18.30 CHRONIC KIDNEY DISEASE, STAGE III (MODERATE): Status: ACTIVE | Noted: 2024-08-27

## 2024-08-27 PROCEDURE — 99213 OFFICE O/P EST LOW 20 MIN: CPT | Mod: PBBFAC | Performed by: INTERNAL MEDICINE

## 2024-08-27 PROCEDURE — 99214 OFFICE O/P EST MOD 30 MIN: CPT | Mod: S$PBB,,, | Performed by: INTERNAL MEDICINE

## 2024-08-27 PROCEDURE — 99999 PR PBB SHADOW E&M-EST. PATIENT-LVL III: CPT | Mod: PBBFAC,,, | Performed by: INTERNAL MEDICINE

## 2024-08-27 RX ORDER — SACUBITRIL AND VALSARTAN 97; 103 MG/1; MG/1
1 TABLET, FILM COATED ORAL 2 TIMES DAILY
Qty: 180 TABLET | Refills: 3 | Status: SHIPPED | OUTPATIENT
Start: 2024-08-27

## 2024-08-27 NOTE — PROGRESS NOTES
Subjective:     Calderon Burt Jr. is a 73 y.o. male with hypertension, hypercholesterolemia, hypertriglyceridemia and diabetes mellitus type 2. He has a healthy weight but used to be overweight. In 12/2019 he suffered a thalamic cerebrovascular accident. He presented with weakness in the left arm. In 4/2021 he began to experience a left sided chest discomfort. The pain came on when he was moving furniture at home. He was noted to have a left bundle branch block. On 6/14/2021 he had an echocardiogram that revealed normal left ventricular size with low normal systolic function with an ejection fraction of 55%. The septum contracted as with left bundle branch block. There was mild diastolic dysfunction. On 6/14/2021 he had a regadenoson MPI that revealed normal perfusion. The ejection fraction was 63%. Accordingly, there was nothing to suggest obstructive coronary artery disease. It appeared the chest pain most certainly was non cardiac in origin. He has not had any chest pain since. On 6/2/2023 he had an echocardiogram that revealed normal left ventricular size with moderate to severe systolic dysfunction. There was dyssynchrony as with left bundle branch block. The ejection fraction was 30%. There was mild mitral regurgitation. With a significant decrease in systolic dysfunction in the setting of several risk factors for vascular disease he was referred for coronary angiography. On 6/13/202 he underwent coronary angiography. The left main was very short with moderate calcification. The left anterior descending coronary artery had an osteal 40% lesion and proximal 30% disease. The first diagonal  branch had osteal 70% disease. The left circumflex coronary artery had osteal 30% disease. The right coronary artery was dominant with distal 30% disease. It was felt he should be treated medically for his coronary artery disease. On 11/5/2023 he felt sharp left shoulder and back pain. The pain got worse when moving his left  arm. The chest pain later resolved. He walks two laps in [employer] a few times a week. On 5/4/2024 he had nausea and vomited once. In 5/2024 he was bothered by a dry cough. No exertional shortness of breath. No palpitations or weak spells. No issues with any of his prescribed medications. Feeling overall well.        Chest Pain   This is a chronic problem. The current episode started more than 1 year ago. The problem has been resolved. Associated symptoms include back pain and a cough. Pertinent negatives include no abdominal pain, claudication, diaphoresis, dizziness, exertional chest pressure, fever, headaches, hemoptysis, irregular heartbeat, leg pain, lower extremity edema, malaise/fatigue, nausea, near-syncope, numbness, orthopnea, palpitations, PND, shortness of breath, sputum production, syncope, vomiting or weakness.   His past medical history is significant for CAD, CHF, diabetes and hyperlipidemia.   Pertinent negatives for past medical history include no muscle weakness.   Cerebrovascular Accident  This is a chronic problem. Associated symptoms include chest pain, coughing and neck pain. Pertinent negatives include no abdominal pain, anorexia, arthralgias, change in bowel habit, chills, congestion, diaphoresis, fatigue, fever, headaches, joint swelling, myalgias, nausea, numbness, rash, sore throat, swollen glands, urinary symptoms, vertigo, visual change, vomiting or weakness.   Hypertension  This is a chronic problem. The current episode started more than 1 year ago. The problem is controlled (usually 125-130/75-80 mmHg at home). Associated symptoms include chest pain and neck pain. Pertinent negatives include no anxiety, blurred vision, headaches, malaise/fatigue, orthopnea, palpitations, peripheral edema, PND, shortness of breath or sweats. There is no history of chronic renal disease.   Hyperlipidemia  This is a chronic problem. The current episode started more than 1 year ago. Recent lipid tests  were reviewed and are normal. Exacerbating diseases include diabetes. He has no history of chronic renal disease, hypothyroidism, liver disease, obesity or nephrotic syndrome. Associated symptoms include chest pain. Pertinent negatives include no focal sensory loss, focal weakness, leg pain, myalgias or shortness of breath.   Congestive Heart Failure  Presents for follow-up visit. Associated symptoms include chest pain. Pertinent negatives include no abdominal pain, chest pressure, claudication, edema, fatigue, muscle weakness, near-syncope, nocturia, orthopnea, palpitations, paroxysmal nocturnal dyspnea or shortness of breath. His past medical history is significant for CAD.   Coronary Artery Disease  Presents for follow-up visit. Symptoms include chest pain. Pertinent negatives include no chest pressure, chest tightness, dizziness, leg swelling, muscle weakness, palpitations, shortness of breath or weight gain. Risk factors include hyperlipidemia. Risk factors do not include obesity. His past medical history is significant for CHF. The symptoms have been stable.       Review of Systems   Constitutional: Negative for chills, diaphoresis, fatigue, fever, malaise/fatigue and weight gain.   HENT:  Negative for congestion, nosebleeds and sore throat.    Eyes:  Negative for blurred vision, double vision, vision loss in left eye and vision loss in right eye.   Cardiovascular:  Positive for chest pain. Negative for claudication, dyspnea on exertion, irregular heartbeat, leg swelling, near-syncope, orthopnea, palpitations, paroxysmal nocturnal dyspnea and syncope.   Respiratory:  Positive for cough. Negative for chest tightness, hemoptysis, shortness of breath, sputum production and wheezing.    Endocrine: Negative for cold intolerance and heat intolerance.   Hematologic/Lymphatic: Negative for bleeding problem. Does not bruise/bleed easily.   Skin:  Negative for color change and rash.   Musculoskeletal:  Positive for back  pain, joint pain (left shoulder) and neck pain. Negative for arthralgias, falls, joint swelling, muscle weakness and myalgias.   Gastrointestinal:  Negative for abdominal pain, anorexia, change in bowel habit, heartburn, hematemesis, hematochezia, hemorrhoids, jaundice, melena, nausea and vomiting.   Genitourinary:  Negative for dysuria, hematuria and nocturia.   Neurological:  Negative for dizziness, focal weakness, headaches, light-headedness, loss of balance, numbness, tremors, vertigo and weakness.   Psychiatric/Behavioral:  Negative for altered mental status, depression and memory loss. The patient is not nervous/anxious.    Allergic/Immunologic: Negative for hives and persistent infections.       Current Outpatient Medications on File Prior to Visit   Medication Sig Dispense Refill    aspirin (ECOTRIN) 81 MG EC tablet Take 1 tablet (81 mg total) by mouth once daily. 90 tablet 3    atorvastatin (LIPITOR) 40 MG tablet Take 1 tablet (40 mg total) by mouth once daily. 90 tablet 3    bimatoprost (LUMIGAN) 0.01 % Drop 1 drop every evening.      bimatoprost (LUMIGAN) 0.01 % Drop Instill 1 drop into both eyes every evening 7.5 mL 4    brimonidine-timoloL (COMBIGAN) 0.2-0.5 % Drop Place 1 drop into both eyes.      brimonidine-timoloL (COMBIGAN) 0.2-0.5 % Drop Instill 1 drop Both Eyes twice a day 10 mL 3    carvediloL (COREG) 3.125 MG tablet Take 1 tablet (3.125 mg total) by mouth 2 (two) times daily. 180 tablet 3    empagliflozin (JARDIANCE) 10 mg tablet Take 1 tablet (10 mg total) by mouth once daily. 90 tablet 3    metFORMIN (GLUMETZA) 1000 MG (MOD) 24 hr tablet Take 1,000 mg by mouth 2 (two) times daily with meals.      promethazine-dextromethorphan (PROMETHAZINE-DM) 6.25-15 mg/5 mL Syrp take 10 mL as needed Orally every 6 hrs 7 days 280 mL 0    psyllium (METAMUCIL) powder Take 1 packet by mouth once daily. Pt takes about twice a week      sacubitriL-valsartan (ENTRESTO) 49-51 mg per tablet Take 1 tablet by mouth 2  (two) times daily. 60 tablet 11    spironolactone (ALDACTONE) 25 MG tablet Take 1 tablet (25 mg total) by mouth once daily. 90 tablet 3    tamsulosin (FLOMAX) 0.4 mg Cap Take 0.4 mg by mouth once daily.       No current facility-administered medications on file prior to visit.       BP (P) 122/78   Pulse (P) 80   Wt (P) 84.4 kg (186 lb)   SpO2 (P) 99%   BMI (P) 25.23 kg/m²     Objective:     Physical Exam  Constitutional:       General: He is not in acute distress.     Appearance: Normal appearance. He is well-developed. He is not toxic-appearing or diaphoretic.   HENT:      Head: Normocephalic and atraumatic.      Nose: Nose normal.   Eyes:      General:         Right eye: No discharge.         Left eye: No discharge.      Conjunctiva/sclera:      Right eye: Right conjunctiva is not injected.      Left eye: Left conjunctiva is not injected.      Pupils: Pupils are equal.      Right eye: Pupil is round.      Left eye: Pupil is round.   Neck:      Thyroid: No thyromegaly.      Vascular: No carotid bruit or JVD.   Cardiovascular:      Rate and Rhythm: Normal rate and regular rhythm. No extrasystoles are present.     Chest Wall: PMI is not displaced.      Pulses:           Radial pulses are 2+ on the right side and 2+ on the left side.        Femoral pulses are 2+ on the right side and 2+ on the left side.       Dorsalis pedis pulses are 2+ on the right side and 2+ on the left side.        Posterior tibial pulses are 2+ on the right side and 2+ on the left side.      Heart sounds: S1 normal and S2 normal. No murmur heard.     Gallop present. S4 sounds present. No S3 sounds.   Pulmonary:      Effort: Pulmonary effort is normal.      Breath sounds: Normal breath sounds.   Chest:      Chest wall: No swelling or tenderness.   Abdominal:      Palpations: Abdomen is soft.      Tenderness: There is no abdominal tenderness.   Musculoskeletal:      Cervical back: Neck supple.      Right ankle: No swelling, deformity or  ecchymosis.      Left ankle: No swelling, deformity or ecchymosis.   Lymphadenopathy:      Head:      Right side of head: No submandibular adenopathy.      Left side of head: No submandibular adenopathy.      Cervical: No cervical adenopathy.   Skin:     General: Skin is warm and dry.      Findings: No rash.      Nails: There is no clubbing.      Comments: Mass consistent with lipoma right upper back.   Neurological:      General: No focal deficit present.      Mental Status: He is alert and oriented to person, place, and time. He is not disoriented.      Cranial Nerves: No cranial nerve deficit.   Psychiatric:         Attention and Perception: Attention and perception normal.         Mood and Affect: Mood and affect normal.         Speech: Speech normal.         Behavior: Behavior normal.         Thought Content: Thought content normal.         Cognition and Memory: Cognition and memory normal.         Judgment: Judgment normal.       Assessment:     1. Heart failure, systolic, chronic    2. Left bundle branch block    3. Coronary artery disease involving native coronary artery of native heart without angina pectoris    4. History of chest pain    5. History of cerebrovascular accident    6. Primary hypertension    7. Hypercholesterolemia    8. Hypertriglyceridemia          Plan:     Heart Failure, Systolic, Chronic              6/2/2023: Echo: Normal left ventricular size with moderate to severe systolic dysfunction. LBBB. EF 30%. Mild MR.   12/6/2023: Echo: Normal left ventricular size with severe systolic dysfunction. Anteroseptal wall and apex severely hypokinetic. Remainder moderately hypokinetic. LBBB. EF 30%. Mild diastolic dysfunction   6/8/2023: Carvedilol 6.25 mg Q12 and empagliflozin 10 mg Q24 were begun.    7/7/2023: Spironolactone 25 mg Q24 was begun.   10/18/2023: Carvedilol 6.25 mg Q12 was reduced to carvedilol 3.125 mg Q12 and benazepril 20 mg Q24 was reduced to benazepril 5 mg Q24 as blood pressure  on low side.   5/8/2024: Sacubitril 24 mg/valsartan 26 mg Q12 was begun and benazepril 5 mg Q24 was discontinued.    7/8/2024: Sacubitril 24 mg/valsartan 26 mg Q12 was increased to sacubitril 49 mg/valsartan 51 mg Q12.  On carvedilol 3.125 mg Q12, empagliflozin 10 mg Q24, sacubitril 49 mg/valsartan 51 mg Q12 and spironolactone 25 mg Q24.  Possible CRT long term but QRS quite narrow.  Appears well compensated.  8/27/2024: Sacubitril 49 mg/valsartan 51 mg Q12 to be increased to sacubitril 97 mg/valsartan 103 mg Q12.  12/2024: Plan next Echo.     2. Left Bundle Branch Block              2021: Diagnosed.              6/8/2023: SR. LBBB 134 ms.    11/6/2023: SR. LBBB.  ms.   He has LBBB but QRS only 136 ms.     3. Coronary Artery Disease  6/13/2023: [facility]: Cor: LM: Very short. Moderate calcification. LAD: Osteal 40%. Prox 30%. D1 osteal 70%. LCX: Osteal 30%. RCA: Dominant. Distal 30%.   Medical therapy.  On atorvastatin 40 mg Q24.  On aspirin 81 mg Q24.     4. History of Chest Pain              4/2021: Began experience chest pain.              12/17/2019: Echo: Normal left ventricular size and systolic function.              6/14/2021: Echo: Normal left ventricular size with low normal systolic function. EF 55%. LBBB. Mild diastolic dysfunction.               6/14/2021: Regadenoson MPI: Normal perfusion. EF 63%.   11/5/2023: Atypical left shoulder and chest pain.              Chest pain is most certainly non-cardiac in origin.              Reassurance.   Resolved.                5. History of Cerebrovascular Accident              12/2019: Thalamic CVA. Left arm weakness.              All risk factors are addressed.              On aspirin 81 mg Q24.                6. Hypertension              2016: Diagnosed.               7/12/2021: Amlodipine 10 mg Q24 was begun as running high.    7/7/2023: Spironolactone 25 mg Q24 was begun and amlodipine 10 mg Q24 was discontinued.    On carvedilol 3.125 mg Q12, sacubitril 49  mg/valsartan 51 mg Q12 and spironolactone 25 mg Q24.              Keeping log at home.              Well controlled.                7. Hypercholesterolemia              2016: Began statin.               On atorvastatin 40 mg Q24.              12/16/2019: Chol 153. HDL 46. LDL 62. .              8/26/2022: Chol 139. HDL 54. LDL 66. .   7/6/2024: Chol 141. HDL 56. LDL 61. .              On atorvastatin 40 mg Q24.              Very favorable lipid panel.     8. Hypertriglyceridemia              2016: Diagnosed.              As above.     9. Diabetes Mellitus, Type 2              2018: Diagnosed. Complications: CVA. Medications: Oral agent.  6/8/2023: Empagliflozin 10 mg Q24 was begun for HF.               On metformin 500 mg Q12 and empagliflozin 10 mg Q24.   7/6/2024: HgbA1C 7.1%.               Tells me well controlled.     10. History of Overweight              6/3/2021: Weight 86 kg. BMI 26.   10/18/2023: Weight 82 kg. BMI 25.     11. Chronic Kidney Disease, Stage 3   7/6/2024: BUN/crea 17/1.3. eGFR 59. K 4.6.     12. Primary Care              Dr. Jigar Durant.    F/u 3 months.    Kevin Ham M.D.

## 2024-12-02 ENCOUNTER — HOSPITAL ENCOUNTER (OUTPATIENT)
Dept: CARDIOLOGY | Facility: OTHER | Age: 74
Discharge: HOME | End: 2024-12-02
Attending: INTERNAL MEDICINE
Payer: MEDICARE

## 2024-12-02 VITALS
WEIGHT: 186 LBS | DIASTOLIC BLOOD PRESSURE: 78 MMHG | BODY MASS INDEX: 25.19 KG/M2 | HEIGHT: 72 IN | SYSTOLIC BLOOD PRESSURE: 122 MMHG

## 2024-12-02 DIAGNOSIS — I50.22 HEART FAILURE, SYSTOLIC, CHRONIC: ICD-10-CM

## 2024-12-02 LAB
AORTIC ROOT ANNULUS: 2.46 CM
ASCENDING AORTA: 2.51 CM
AV INDEX (PROSTH): 0.76
AV MEAN GRADIENT: 3.6 MMHG
AV PEAK GRADIENT: 6.8 MMHG
AV VALVE AREA BY VELOCITY RATIO: 1.8 CM²
AV VALVE AREA: 1.9 CM²
AV VELOCITY RATIO: 0.69
BSA FOR ECHO PROCEDURE: 2.07 M2
CV ECHO LV RWT: 0.41 CM
DOP CALC AO PEAK VEL: 1.3 M/S
DOP CALC AO VTI: 26.7 CM
DOP CALC LVOT AREA: 2.5 CM2
DOP CALC LVOT DIAMETER: 1.8 CM
DOP CALC LVOT PEAK VEL: 0.9 M/S
DOP CALC LVOT STROKE VOLUME: 51.6 CM3
DOP CALCLVOT PEAK VEL VTI: 20.3 CM
E WAVE DECELERATION TIME: 207.02 MSEC
E/A RATIO: 0.79
ECHO LV POSTERIOR WALL: 0.8 CM (ref 0.6–1.1)
EJECTION FRACTION: 45 %
FRACTIONAL SHORTENING: 25.6 % (ref 28–44)
GLOBAL LONGITUIDAL STRAIN: 13 %
INTERVENTRICULAR SEPTUM: 0.9 CM (ref 0.6–1.1)
IVC DIAMETER: 1.63 CM
LA MAJOR: 4.23 CM
LA MINOR: 3.83 CM
LA WIDTH: 2.9 CM
LAAS-AP2: 15 CM2
LAAS-AP4: 13 CM2
LEFT ATRIUM AREA SYSTOLIC (APICAL 2 CHAMBER): 15.29 CM2
LEFT ATRIUM AREA SYSTOLIC (APICAL 4 CHAMBER): 13 CM2
LEFT ATRIUM SIZE: 3.13 CM
LEFT ATRIUM VOLUME INDEX MOD: 16.3 ML/M2
LEFT ATRIUM VOLUME INDEX: 15 ML/M2
LEFT ATRIUM VOLUME MOD: 33.7 ML
LEFT ATRIUM VOLUME: 31.02 CM3
LEFT INTERNAL DIMENSION IN SYSTOLE: 2.9 CM (ref 2.1–4)
LEFT VENTRICLE DIASTOLIC VOLUME INDEX: 31.55 ML/M2
LEFT VENTRICLE DIASTOLIC VOLUME: 65.31 ML
LEFT VENTRICLE END SYSTOLIC VOLUME APICAL 2 CHAMBER: 38.36 ML
LEFT VENTRICLE END SYSTOLIC VOLUME APICAL 4 CHAMBER: 27.27 ML
LEFT VENTRICLE MASS INDEX: 47 G/M2
LEFT VENTRICLE SYSTOLIC VOLUME INDEX: 15.9 ML/M2
LEFT VENTRICLE SYSTOLIC VOLUME: 32.83 ML
LEFT VENTRICULAR INTERNAL DIMENSION IN DIASTOLE: 3.9 CM (ref 3.5–6)
LEFT VENTRICULAR MASS: 97.4 G
LVED V (TEICH): 65.31 ML
LVES V (TEICH): 32.83 ML
LVOT MG: 1.33 MMHG
LVOT MV: 0.56 CM/S
MV PEAK A VEL: 0.76 M/S
MV PEAK E VEL: 0.6 M/S
MV STENOSIS PRESSURE HALF TIME: 60.04 MS
MV VALVE AREA P 1/2 METHOD: 3.66 CM2
PISA MRMAX VEL: 1.67 M/S
PISA TR MAX VEL: 1.71 M/S
PV MV: 0.54 M/S
PV PEAK GRADIENT: 3 MMHG
PV PEAK VELOCITY: 0.81 M/S
RA MAJOR: 3.12 CM
RA PRESSURE ESTIMATED: 3 MMHG
RA WIDTH: 2.3 CM
RIGHT VENTRICULAR END-DIASTOLIC DIMENSION: 1.93 CM
RV TB RVSP: 5 MMHG
RV TISSUE DOPPLER FREE WALL SYSTOLIC VELOCITY 1 (APICAL 4 CHAMBER VIEW): 9.08 CM/S
SINUS: 2.5 CM
STJ: 2.65 CM
TR MAX PG: 12 MMHG
TRICUSPID ANNULAR PLANE SYSTOLIC EXCURSION: 1.5 CM
TV REST PULMONARY ARTERY PRESSURE: 15 MMHG
Z-SCORE OF LEFT VENTRICULAR DIMENSION IN END DIASTOLE: -4.79
Z-SCORE OF LEFT VENTRICULAR DIMENSION IN END SYSTOLE: -2.24

## 2024-12-02 PROCEDURE — 93306 TTE W/DOPPLER COMPLETE: CPT

## 2024-12-02 PROCEDURE — 93306 TTE W/DOPPLER COMPLETE: CPT | Mod: 26,,, | Performed by: INTERNAL MEDICINE

## 2024-12-02 PROCEDURE — 93356 MYOCRD STRAIN IMG SPCKL TRCK: CPT | Mod: ,,, | Performed by: INTERNAL MEDICINE

## 2024-12-18 ENCOUNTER — OFFICE VISIT (OUTPATIENT)
Dept: CARDIOLOGY | Facility: CLINIC | Age: 74
End: 2024-12-18
Attending: INTERNAL MEDICINE
Payer: MEDICARE

## 2024-12-18 VITALS
HEART RATE: 80 BPM | WEIGHT: 192.44 LBS | SYSTOLIC BLOOD PRESSURE: 111 MMHG | OXYGEN SATURATION: 97 % | DIASTOLIC BLOOD PRESSURE: 60 MMHG | BODY MASS INDEX: 26.07 KG/M2 | HEIGHT: 72 IN

## 2024-12-18 DIAGNOSIS — E11.59 TYPE 2 DIABETES MELLITUS WITH OTHER CIRCULATORY COMPLICATION, WITHOUT LONG-TERM CURRENT USE OF INSULIN: ICD-10-CM

## 2024-12-18 DIAGNOSIS — E78.1 HYPERTRIGLYCERIDEMIA: ICD-10-CM

## 2024-12-18 DIAGNOSIS — E78.00 HYPERCHOLESTEROLEMIA: ICD-10-CM

## 2024-12-18 DIAGNOSIS — I10 PRIMARY HYPERTENSION: ICD-10-CM

## 2024-12-18 DIAGNOSIS — I25.10 CORONARY ARTERY DISEASE INVOLVING NATIVE CORONARY ARTERY OF NATIVE HEART WITHOUT ANGINA PECTORIS: ICD-10-CM

## 2024-12-18 DIAGNOSIS — I44.7 LEFT BUNDLE BRANCH BLOCK: ICD-10-CM

## 2024-12-18 DIAGNOSIS — N18.31 STAGE 3A CHRONIC KIDNEY DISEASE: ICD-10-CM

## 2024-12-18 DIAGNOSIS — Z86.73 HISTORY OF CEREBROVASCULAR ACCIDENT: ICD-10-CM

## 2024-12-18 DIAGNOSIS — Z87.898 HISTORY OF CHEST PAIN: ICD-10-CM

## 2024-12-18 DIAGNOSIS — I50.22 HEART FAILURE, SYSTOLIC, CHRONIC: ICD-10-CM

## 2024-12-18 PROCEDURE — 99999 PR PBB SHADOW E&M-EST. PATIENT-LVL III: CPT | Mod: PBBFAC,,, | Performed by: INTERNAL MEDICINE

## 2024-12-18 PROCEDURE — 99213 OFFICE O/P EST LOW 20 MIN: CPT | Mod: PBBFAC | Performed by: INTERNAL MEDICINE

## 2024-12-18 PROCEDURE — 93010 ELECTROCARDIOGRAM REPORT: CPT | Mod: S$PBB,,, | Performed by: INTERNAL MEDICINE

## 2024-12-18 PROCEDURE — 99214 OFFICE O/P EST MOD 30 MIN: CPT | Mod: S$PBB,25,, | Performed by: INTERNAL MEDICINE

## 2024-12-18 PROCEDURE — 93005 ELECTROCARDIOGRAM TRACING: CPT | Mod: PBBFAC | Performed by: INTERNAL MEDICINE

## 2024-12-18 RX ORDER — ATORVASTATIN CALCIUM 40 MG/1
40 TABLET, FILM COATED ORAL DAILY
Qty: 90 TABLET | Refills: 3 | Status: SHIPPED | OUTPATIENT
Start: 2024-12-18

## 2024-12-18 RX ORDER — ASPIRIN 81 MG/1
81 TABLET ORAL DAILY
Qty: 90 TABLET | Refills: 3 | Status: SHIPPED | OUTPATIENT
Start: 2024-12-18

## 2024-12-18 RX ORDER — SPIRONOLACTONE 25 MG/1
25 TABLET ORAL DAILY
Qty: 90 TABLET | Refills: 3 | Status: SHIPPED | OUTPATIENT
Start: 2024-12-18

## 2024-12-18 RX ORDER — SACUBITRIL AND VALSARTAN 97; 103 MG/1; MG/1
1 TABLET, FILM COATED ORAL 2 TIMES DAILY
Qty: 180 TABLET | Refills: 3 | Status: SHIPPED | OUTPATIENT
Start: 2024-12-18

## 2024-12-18 RX ORDER — CARVEDILOL 6.25 MG/1
6.25 TABLET ORAL 2 TIMES DAILY
Qty: 180 TABLET | Refills: 3 | Status: SHIPPED | OUTPATIENT
Start: 2024-12-18

## 2024-12-18 NOTE — PROGRESS NOTES
Subjective:     Calderon Burt Jr. is a 74 y.o. male with hypertension, hypercholesterolemia, hypertriglyceridemia and diabetes mellitus type 2. He has a healthy weight but used to be overweight. In 12/2019 he suffered a thalamic cerebrovascular accident. He presented with weakness in the left arm. In 4/2021 he began to experience a left sided chest discomfort. The pain came on when he was moving furniture at home. He was noted to have a left bundle branch block. On 6/14/2021 he had an echocardiogram that revealed normal left ventricular size with low normal systolic function with an ejection fraction of 55%. The septum contracted as with left bundle branch block. There was mild diastolic dysfunction. On 6/14/2021 he had a regadenoson MPI that revealed normal perfusion. The ejection fraction was 63%. Accordingly, there was nothing to suggest obstructive coronary artery disease. It appeared the chest pain most certainly was non cardiac in origin. He has not had any chest pain since. On 6/2/2023 he had an echocardiogram that revealed normal left ventricular size with moderate to severe systolic dysfunction. There was dyssynchrony as with left bundle branch block. The ejection fraction was 30%. There was mild mitral regurgitation. With a significant decrease in systolic dysfunction in the setting of several risk factors for vascular disease he was referred for coronary angiography. On 6/13/202 he underwent coronary angiography. The left main was very short with moderate calcification. The left anterior descending coronary artery had an osteal 40% lesion and proximal 30% disease. The first diagonal  branch had osteal 70% disease. The left circumflex coronary artery had osteal 30% disease. The right coronary artery was dominant with distal 30% disease. It was felt he should be treated medically for his coronary artery disease. On 11/5/2023 he felt sharp left shoulder and back pain. The pain got worse when moving his left  arm. The chest pain later resolved. He walks two laps in [employer] a few times a week. On 5/4/2024 he had nausea and vomited once. In 5/2024 he was bothered by a dry cough. No exertional shortness of breath. No palpitations or weak spells. No issues with any of his prescribed medications. Feeling overall well.        Chest Pain   This is a chronic problem. The current episode started more than 1 year ago. The problem has been resolved. Associated symptoms include back pain and a cough. Pertinent negatives include no abdominal pain, claudication, diaphoresis, dizziness, exertional chest pressure, fever, headaches, hemoptysis, irregular heartbeat, leg pain, lower extremity edema, malaise/fatigue, nausea, near-syncope, numbness, orthopnea, palpitations, PND, shortness of breath, sputum production, syncope, vomiting or weakness.   His past medical history is significant for CAD, CHF, diabetes and hyperlipidemia.   Pertinent negatives for past medical history include no muscle weakness.   Cerebrovascular Accident  This is a chronic problem. Associated symptoms include chest pain, coughing and neck pain. Pertinent negatives include no abdominal pain, anorexia, arthralgias, change in bowel habit, chills, congestion, diaphoresis, fatigue, fever, headaches, joint swelling, myalgias, nausea, numbness, rash, sore throat, swollen glands, urinary symptoms, vertigo, visual change, vomiting or weakness.   Hypertension  This is a chronic problem. The current episode started more than 1 year ago. The problem is controlled (usually 125-130/75-80 mmHg at home). Associated symptoms include chest pain and neck pain. Pertinent negatives include no anxiety, blurred vision, headaches, malaise/fatigue, orthopnea, palpitations, peripheral edema, PND, shortness of breath or sweats. There is no history of chronic renal disease.   Hyperlipidemia  This is a chronic problem. The current episode started more than 1 year ago. Recent lipid tests  were reviewed and are normal. Exacerbating diseases include diabetes. He has no history of chronic renal disease, hypothyroidism, liver disease, obesity or nephrotic syndrome. Associated symptoms include chest pain. Pertinent negatives include no focal sensory loss, focal weakness, leg pain, myalgias or shortness of breath.   Congestive Heart Failure  Presents for follow-up visit. Associated symptoms include chest pain. Pertinent negatives include no abdominal pain, chest pressure, claudication, edema, fatigue, muscle weakness, near-syncope, nocturia, orthopnea, palpitations, paroxysmal nocturnal dyspnea or shortness of breath. His past medical history is significant for CAD.   Coronary Artery Disease  Presents for follow-up visit. Symptoms include chest pain. Pertinent negatives include no chest pressure, chest tightness, dizziness, leg swelling, muscle weakness, palpitations, shortness of breath or weight gain. Risk factors include hyperlipidemia. Risk factors do not include obesity. His past medical history is significant for CHF. The symptoms have been stable.       Review of Systems   Constitutional: Negative for chills, diaphoresis, fatigue, fever, malaise/fatigue and weight gain.   HENT:  Negative for congestion, nosebleeds and sore throat.    Eyes:  Negative for blurred vision, double vision, vision loss in left eye and vision loss in right eye.   Cardiovascular:  Positive for chest pain. Negative for claudication, dyspnea on exertion, irregular heartbeat, leg swelling, near-syncope, orthopnea, palpitations, paroxysmal nocturnal dyspnea and syncope.   Respiratory:  Positive for cough. Negative for chest tightness, hemoptysis, shortness of breath, sputum production and wheezing.    Endocrine: Negative for cold intolerance and heat intolerance.   Hematologic/Lymphatic: Negative for bleeding problem. Does not bruise/bleed easily.   Skin:  Negative for color change and rash.   Musculoskeletal:  Positive for back  pain, joint pain (left shoulder) and neck pain. Negative for arthralgias, falls, joint swelling, muscle weakness and myalgias.   Gastrointestinal:  Negative for abdominal pain, anorexia, change in bowel habit, heartburn, hematemesis, hematochezia, hemorrhoids, jaundice, melena, nausea and vomiting.   Genitourinary:  Negative for dysuria, hematuria and nocturia.   Neurological:  Negative for dizziness, focal weakness, headaches, light-headedness, loss of balance, numbness, tremors, vertigo and weakness.   Psychiatric/Behavioral:  Negative for altered mental status, depression and memory loss. The patient is not nervous/anxious.    Allergic/Immunologic: Negative for hives and persistent infections.       Current Outpatient Medications on File Prior to Visit   Medication Sig Dispense Refill    aspirin (ECOTRIN) 81 MG EC tablet Take 1 tablet (81 mg total) by mouth once daily. 90 tablet 3    atorvastatin (LIPITOR) 40 MG tablet Take 1 tablet (40 mg total) by mouth once daily. 90 tablet 3    bimatoprost (LUMIGAN) 0.01 % Drop Instill 1 drop into both eyes every evening 7.5 mL 4    brimonidine-timoloL (COMBIGAN) 0.2-0.5 % Drop Instill 1 drop Both Eyes twice a day 10 mL 3    carvediloL (COREG) 3.125 MG tablet Take 1 tablet (3.125 mg total) by mouth 2 (two) times daily. 180 tablet 3    empagliflozin (JARDIANCE) 10 mg tablet Take 1 tablet (10 mg total) by mouth once daily. 90 tablet 3    metFORMIN (GLUMETZA) 1000 MG (MOD) 24 hr tablet Take 1,000 mg by mouth 2 (two) times daily with meals.      psyllium (METAMUCIL) powder Take 1 packet by mouth once daily. Pt takes about twice a week      sacubitriL-valsartan (ENTRESTO)  mg per tablet Take 1 tablet by mouth 2 (two) times daily. 180 tablet 3    spironolactone (ALDACTONE) 25 MG tablet Take 1 tablet (25 mg total) by mouth once daily. 90 tablet 3    bimatoprost (LUMIGAN) 0.01 % Drop 1 drop every evening. (Patient not taking: Reported on 12/18/2024)      bimatoprost (LUMIGAN)  0.01 % Drop Instill 1 drop Both Eyes every evening 2.5 mL 12    brimonidine-timoloL (COMBIGAN) 0.2-0.5 % Drop Place 1 drop into both eyes. (Patient not taking: Reported on 12/18/2024)      promethazine-dextromethorphan (PROMETHAZINE-DM) 6.25-15 mg/5 mL Syrp take 10 mL as needed Orally every 6 hrs 7 days (Patient not taking: Reported on 12/18/2024) 280 mL 0    tamsulosin (FLOMAX) 0.4 mg Cap Take 0.4 mg by mouth once daily. (Patient not taking: Reported on 12/18/2024)       No current facility-administered medications on file prior to visit.       /60   Pulse 80   Ht 6' (1.829 m)   Wt 87.3 kg (192 lb 7.4 oz)   SpO2 97%   BMI 26.10 kg/m²     Objective:     Physical Exam  Constitutional:       General: He is not in acute distress.     Appearance: Normal appearance. He is well-developed. He is not toxic-appearing or diaphoretic.   HENT:      Head: Normocephalic and atraumatic.      Nose: Nose normal.   Eyes:      General:         Right eye: No discharge.         Left eye: No discharge.      Conjunctiva/sclera:      Right eye: Right conjunctiva is not injected.      Left eye: Left conjunctiva is not injected.      Pupils: Pupils are equal.      Right eye: Pupil is round.      Left eye: Pupil is round.   Neck:      Thyroid: No thyromegaly.      Vascular: No carotid bruit or JVD.   Cardiovascular:      Rate and Rhythm: Normal rate and regular rhythm. No extrasystoles are present.     Chest Wall: PMI is not displaced.      Pulses:           Radial pulses are 2+ on the right side and 2+ on the left side.        Femoral pulses are 2+ on the right side and 2+ on the left side.       Dorsalis pedis pulses are 2+ on the right side and 2+ on the left side.        Posterior tibial pulses are 2+ on the right side and 2+ on the left side.      Heart sounds: S1 normal and S2 normal. No murmur heard.     Gallop present. S4 sounds present. No S3 sounds.   Pulmonary:      Effort: Pulmonary effort is normal.      Breath sounds:  Normal breath sounds.   Chest:      Chest wall: No swelling or tenderness.   Abdominal:      Palpations: Abdomen is soft.      Tenderness: There is no abdominal tenderness.   Musculoskeletal:      Cervical back: Neck supple.      Right ankle: No swelling, deformity or ecchymosis.      Left ankle: No swelling, deformity or ecchymosis.   Lymphadenopathy:      Head:      Right side of head: No submandibular adenopathy.      Left side of head: No submandibular adenopathy.      Cervical: No cervical adenopathy.   Skin:     General: Skin is warm and dry.      Findings: No rash.      Nails: There is no clubbing.      Comments: Mass consistent with lipoma right upper back.   Neurological:      General: No focal deficit present.      Mental Status: He is alert and oriented to person, place, and time. He is not disoriented.      Cranial Nerves: No cranial nerve deficit.   Psychiatric:         Attention and Perception: Attention and perception normal.         Mood and Affect: Mood and affect normal.         Speech: Speech normal.         Behavior: Behavior normal.         Thought Content: Thought content normal.         Cognition and Memory: Cognition and memory normal.         Judgment: Judgment normal.       Assessment:     1. Heart failure, systolic, chronic    2. Left bundle branch block    3. Coronary artery disease involving native coronary artery of native heart without angina pectoris    4. History of chest pain    5. History of cerebrovascular accident    6. Primary hypertension    7. Hypercholesterolemia    8. Hypertriglyceridemia    9. Type 2 diabetes mellitus with other circulatory complication, without long-term current use of insulin    10. Stage 3a chronic kidney disease          Plan:     Heart Failure, Systolic, Chronic              6/2/2023: Echo: Normal left ventricular size with moderate to severe systolic dysfunction. LBBB. EF 30%. Mild MR.   12/6/2023: Echo: Normal left ventricular size with severe  systolic dysfunction. Anteroseptal wall and apex severely hypokinetic. Remainder moderately hypokinetic. LBBB. EF 30%. Mild diastolic dysfunction   12/2/2024: Echo: Normal left ventricular size with mild systolic dysfunction. LBBB. Septum moderately hypokinetic. Remainder contract well. EF 45%. LVGLS - 45%. Mild diastolic dysfunction.   6/8/2023: Carvedilol 6.25 mg Q12 and empagliflozin 10 mg Q24 were begun.    7/7/2023: Spironolactone 25 mg Q24 was begun.   10/18/2023: Carvedilol 6.25 mg Q12 was reduced to carvedilol 3.125 mg Q12 and benazepril 20 mg Q24 was reduced to benazepril 5 mg Q24 as blood pressure on low side.   5/8/2024: Sacubitril 24 mg/valsartan 26 mg Q12 was begun and benazepril 5 mg Q24 was discontinued.    7/8/2024: Sacubitril 24 mg/valsartan 26 mg Q12 was increased to sacubitril 49 mg/valsartan 51 mg Q12.   8/27/2024: Sacubitril 49 mg/valsartan 51 mg Q12 was increased to sacubitril 97 mg/valsartan 103 mg Q12.  On carvedilol 3.125 mg Q12, empagliflozin 10 mg Q24, sacubitril 97 mg/valsartan 103 mg Q12 and spironolactone 25 mg Q24.  Possibly CRT long term but QRS quite narrow.  Appears well compensated.  12/18/2024: Carvedilol 3.125 mg Q12 to be increased to carvedilol 6.25 mg Q12 as HR 91 bpm , pressure 111/60 and no low readings.   12/2025: Plan next Echo.     2. Left Bundle Branch Block              2021: Diagnosed.              6/8/2023: SR. LBBB 134 ms.    11/6/2023: SR. LBBB.  ms.   12/18/2024: NSR. LBBB.  ms.   He has LBBB but QRS only 136 ms.     3. Coronary Artery Disease  6/13/2023: [facility]: Cor: LM: Very short. Moderate calcification. LAD: Osteal 40%. Prox 30%. D1 osteal 70%. LCX: Osteal 30%. RCA: Dominant. Distal 30%.   Medical therapy.  On atorvastatin 40 mg Q24.  On aspirin 81 mg Q24.     4. History of Chest Pain              4/2021: Began experience chest pain.              12/17/2019: Echo: Normal left ventricular size and systolic function.              6/14/2021: Echo:  Normal left ventricular size with low normal systolic function. EF 55%. LBBB. Mild diastolic dysfunction.               6/14/2021: Regadenoson MPI: Normal perfusion. EF 63%.   11/5/2023: Atypical left shoulder and chest pain.              Chest pain is most certainly non-cardiac in origin.              Reassurance.   Resolved.                5. History of Cerebrovascular Accident              12/2019: Thalamic CVA. Left arm weakness.              All risk factors are addressed.              On aspirin 81 mg Q24.                6. Hypertension              2016: Diagnosed.               7/12/2021: Amlodipine 10 mg Q24 was begun as running high.    7/7/2023: Spironolactone 25 mg Q24 was begun and amlodipine 10 mg Q24 was discontinued.    On carvedilol 3.125 mg Q12, sacubitril 49 mg/valsartan 51 mg Q12 and spironolactone 25 mg Q24.              Keeping log at home.              Well controlled.                7. Hypercholesterolemia              2016: Began statin.               On atorvastatin 40 mg Q24.              12/16/2019: Chol 153. HDL 46. LDL 62. .              8/26/2022: Chol 139. HDL 54. LDL 66. .   7/6/2024: Chol 141. HDL 56. LDL 61. .              On atorvastatin 40 mg Q24.              Very favorable lipid panel.     8. Hypertriglyceridemia              2016: Diagnosed.              As above.     9. Diabetes Mellitus, Type 2              2018: Diagnosed. Complications: CVA. Medications: Oral agent.  6/8/2023: Empagliflozin 10 mg Q24 was begun for HF.               On metformin 500 mg Q12 and empagliflozin 10 mg Q24.   7/6/2024: HgbA1C 7.1%.               Tells me well controlled.     10. History of Overweight              6/3/2021: Weight 86 kg. BMI 26.   10/18/2023: Weight 82 kg. BMI 25.     11. Chronic Kidney Disease, Stage 3   7/6/2024: BUN/crea 17/1.3. eGFR 59. K 4.6.     12. Primary Care              Dr. Jigar Durant.    F/u 4 months.    Kevin Ham M.D.

## 2025-01-09 ENCOUNTER — HOSPITAL ENCOUNTER (EMERGENCY)
Facility: OTHER | Age: 75
Discharge: HOME | End: 2025-01-09
Attending: EMERGENCY MEDICINE
Payer: MEDICARE

## 2025-01-09 VITALS
SYSTOLIC BLOOD PRESSURE: 117 MMHG | HEIGHT: 72 IN | OXYGEN SATURATION: 96 % | DIASTOLIC BLOOD PRESSURE: 74 MMHG | WEIGHT: 188 LBS | RESPIRATION RATE: 18 BRPM | HEART RATE: 78 BPM | BODY MASS INDEX: 25.47 KG/M2 | TEMPERATURE: 98 F

## 2025-01-09 DIAGNOSIS — S00.81XA ABRASION OF FOREHEAD, INITIAL ENCOUNTER: ICD-10-CM

## 2025-01-09 DIAGNOSIS — M25.511 ACUTE PAIN OF RIGHT SHOULDER: ICD-10-CM

## 2025-01-09 DIAGNOSIS — R55 SYNCOPE: ICD-10-CM

## 2025-01-09 DIAGNOSIS — R55 SYNCOPE, UNSPECIFIED SYNCOPE TYPE: Primary | ICD-10-CM
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
## 2025-01-09 DIAGNOSIS — S01.511A LIP LACERATION, INITIAL ENCOUNTER: ICD-10-CM

## 2025-01-09 LAB
ALBUMIN SERPL BCP-MCNC: 4 G/DL (ref 3.5–5.2)
ALP SERPL-CCNC: 60 U/L (ref 40–150)
ALT SERPL W/O P-5'-P-CCNC: 54 U/L (ref 10–44)
ANION GAP SERPL CALC-SCNC: 14 MMOL/L (ref 8–16)
AST SERPL-CCNC: 48 U/L (ref 10–40)
BACTERIA #/AREA URNS HPF: ABNORMAL /HPF
BASOPHILS # BLD AUTO: 0.06 K/UL (ref 0–0.2)
BASOPHILS NFR BLD: 0.5 % (ref 0–1.9)
BILIRUB SERPL-MCNC: 0.5 MG/DL (ref 0.1–1)
BILIRUB UR QL STRIP: NEGATIVE
BNP SERPL-MCNC: <10 PG/ML (ref 0–99)
BUN SERPL-MCNC: 26 MG/DL (ref 8–23)
CALCIUM SERPL-MCNC: 9.8 MG/DL (ref 8.7–10.5)
CHLORIDE SERPL-SCNC: 99 MMOL/L (ref 95–110)
CLARITY UR: CLEAR
CO2 SERPL-SCNC: 20 MMOL/L (ref 23–29)
COLOR UR: YELLOW
CREAT SERPL-MCNC: 1.7 MG/DL (ref 0.5–1.4)
DIFFERENTIAL METHOD BLD: ABNORMAL
EOSINOPHIL # BLD AUTO: 0.1 K/UL (ref 0–0.5)
EOSINOPHIL NFR BLD: 1 % (ref 0–8)
ERYTHROCYTE [DISTWIDTH] IN BLOOD BY AUTOMATED COUNT: 12.5 % (ref 11.5–14.5)
EST. GFR  (NO RACE VARIABLE): 42 ML/MIN/1.73 M^2
GLUCOSE SERPL-MCNC: 195 MG/DL (ref 70–110)
GLUCOSE UR QL STRIP: ABNORMAL
HCT VFR BLD AUTO: 44.4 % (ref 40–54)
HCV AB SERPL QL IA: POSITIVE
HGB BLD-MCNC: 15.2 G/DL (ref 14–18)
HGB UR QL STRIP: NEGATIVE
HIV 1+2 AB+HIV1 P24 AG SERPL QL IA: NEGATIVE
HYALINE CASTS #/AREA URNS LPF: 8 /LPF
IMM GRANULOCYTES # BLD AUTO: 0.05 K/UL (ref 0–0.04)
IMM GRANULOCYTES NFR BLD AUTO: 0.5 % (ref 0–0.5)
KETONES UR QL STRIP: NEGATIVE
LEUKOCYTE ESTERASE UR QL STRIP: NEGATIVE
LYMPHOCYTES # BLD AUTO: 1.9 K/UL (ref 1–4.8)
LYMPHOCYTES NFR BLD: 17.1 % (ref 18–48)
MCH RBC QN AUTO: 32 PG (ref 27–31)
MCHC RBC AUTO-ENTMCNC: 34.2 G/DL (ref 32–36)
MCV RBC AUTO: 94 FL (ref 82–98)
MICROSCOPIC COMMENT: ABNORMAL
MONOCYTES # BLD AUTO: 0.8 K/UL (ref 0.3–1)
MONOCYTES NFR BLD: 6.9 % (ref 4–15)
NEUTROPHILS # BLD AUTO: 8.1 K/UL (ref 1.8–7.7)
NEUTROPHILS NFR BLD: 74 % (ref 38–73)
NITRITE UR QL STRIP: NEGATIVE
NRBC BLD-RTO: 0 /100 WBC
PH UR STRIP: 5 [PH] (ref 5–8)
PLATELET # BLD AUTO: 206 K/UL (ref 150–450)
PMV BLD AUTO: 10.2 FL (ref 9.2–12.9)
POCT GLUCOSE: 214 MG/DL (ref 70–110)
POTASSIUM SERPL-SCNC: 4.5 MMOL/L (ref 3.5–5.1)
PROT SERPL-MCNC: 8.1 G/DL (ref 6–8.4)
PROT UR QL STRIP: NEGATIVE
RBC # BLD AUTO: 4.75 M/UL (ref 4.6–6.2)
RBC #/AREA URNS HPF: 0 /HPF (ref 0–4)
SODIUM SERPL-SCNC: 133 MMOL/L (ref 136–145)
SP GR UR STRIP: >1.03 (ref 1–1.03)
SQUAMOUS #/AREA URNS HPF: 0 /HPF
TROPONIN I SERPL DL<=0.01 NG/ML-MCNC: <0.006 NG/ML (ref 0–0.03)
URN SPEC COLLECT METH UR: ABNORMAL
UROBILINOGEN UR STRIP-ACNC: NEGATIVE EU/DL
WBC # BLD AUTO: 10.97 K/UL (ref 3.9–12.7)
WBC #/AREA URNS HPF: 1 /HPF (ref 0–5)
YEAST URNS QL MICRO: ABNORMAL

## 2025-01-09 PROCEDURE — 63600175 PHARM REV CODE 636 W HCPCS: Performed by: NURSE PRACTITIONER

## 2025-01-09 PROCEDURE — 83880 ASSAY OF NATRIURETIC PEPTIDE: CPT | Performed by: NURSE PRACTITIONER

## 2025-01-09 PROCEDURE — 93005 ELECTROCARDIOGRAM TRACING: CPT

## 2025-01-09 PROCEDURE — 80053 COMPREHEN METABOLIC PANEL: CPT | Performed by: NURSE PRACTITIONER

## 2025-01-09 PROCEDURE — 96374 THER/PROPH/DIAG INJ IV PUSH: CPT

## 2025-01-09 PROCEDURE — 84484 ASSAY OF TROPONIN QUANT: CPT | Performed by: NURSE PRACTITIONER

## 2025-01-09 PROCEDURE — 93010 ELECTROCARDIOGRAM REPORT: CPT | Mod: ,,, | Performed by: INTERNAL MEDICINE

## 2025-01-09 PROCEDURE — 87522 HEPATITIS C REVRS TRNSCRPJ: CPT | Performed by: EMERGENCY MEDICINE

## 2025-01-09 PROCEDURE — 36415 COLL VENOUS BLD VENIPUNCTURE: CPT | Performed by: EMERGENCY MEDICINE

## 2025-01-09 PROCEDURE — 81000 URINALYSIS NONAUTO W/SCOPE: CPT | Performed by: NURSE PRACTITIONER

## 2025-01-09 PROCEDURE — 86803 HEPATITIS C AB TEST: CPT | Performed by: EMERGENCY MEDICINE

## 2025-01-09 PROCEDURE — 99285 EMERGENCY DEPT VISIT HI MDM: CPT | Mod: 25

## 2025-01-09 PROCEDURE — 85025 COMPLETE CBC W/AUTO DIFF WBC: CPT | Performed by: NURSE PRACTITIONER

## 2025-01-09 PROCEDURE — 87389 HIV-1 AG W/HIV-1&-2 AB AG IA: CPT | Performed by: EMERGENCY MEDICINE

## 2025-01-09 RX ORDER — KETOROLAC TROMETHAMINE 30 MG/ML
15 INJECTION, SOLUTION INTRAMUSCULAR; INTRAVENOUS
Status: COMPLETED | OUTPATIENT
Start: 2025-01-09 | End: 2025-01-09

## 2025-01-09 RX ORDER — LIDOCAINE HYDROCHLORIDE 10 MG/ML
5 INJECTION, SOLUTION INFILTRATION; PERINEURAL
Status: DISCONTINUED | OUTPATIENT
Start: 2025-01-09 | End: 2025-01-09

## 2025-01-09 RX ADMIN — KETOROLAC TROMETHAMINE 15 MG: 30 INJECTION, SOLUTION INTRAMUSCULAR; INTRAVENOUS at 09:01

## 2025-01-09 NOTE — FIRST PROVIDER EVALUATION
Emergency Department TeleTriage Encounter Note      CHIEF COMPLAINT    Chief Complaint   Patient presents with    Fall     Felt dizzy and fell in the kitchen earlier today and hit his head. Unsure if he lost consciousness. Lac to lower lip and forehead. Bleeding well controlled. Takes 81 mg asa daily.       VITAL SIGNS   Initial Vitals [01/09/25 1705]   BP Pulse Resp Temp SpO2   116/70 80 20 97.4 °F (36.3 °C) 100 %      MAP       --            ALLERGIES    Review of patient's allergies indicates:  No Known Allergies    PROVIDER TRIAGE NOTE  74 year old male with history of type 2 DM on metformin presents to the ER after sustaining a syncopal episode and collapse with fall landing on face. He reports he was inside and fell onto hard ground. Reports someone in family who witnessed event stated he was unresponsive for only seconds. Reports laceration to forehead and lip along with SOB and pain to right shoulder. Denies CP. States he is on ASA daily. Denies hip pain back pain or other injuries.       AAOx3, respirations even and non- labored, stable vitals, normal coloration of skin, sitting upright in triage chair, appears in no acute distress.          ORDERS  Labs Reviewed   HEPATITIS C ANTIBODY   HIV 1 / 2 ANTIBODY       ED Orders (720h ago, onward)      Start Ordered     Status Ordering Provider    01/09/25 1709 01/09/25 1708  Hepatitis C Antibody  STAT         Ordered FIDEL MOMIN    01/09/25 1709 01/09/25 1708  HIV 1/2 Ag/Ab (4th Gen)  STAT         Ordered FIDEL MOMIN              Virtual Visit Note: The provider triage portion of this emergency department evaluation and documentation was performed via [employer], a HIPAA-compliant telemedicine application, in concert with a tele-presenter in the room. A face to face patient evaluation with one of my colleagues will occur once the patient is placed in an emergency department room.      DISCLAIMER: This note was prepared with M*Modal voice recognition  transcription software. Garbled syntax, mangled pronouns, and other bizarre constructions may be attributed to that software system.

## 2025-01-10 LAB
OHS QRS DURATION: 134 MS
OHS QTC CALCULATION: 417 MS

## 2025-01-10 NOTE — ED NOTES
Pt presents to ED c/o syncopal episode today, Pt reports falling and hitting her head and lip on counter. Pt denies SOB and CP.

## 2025-01-10 NOTE — ED PROVIDER NOTES
Source of History:  Patient    Chief complaint:  Fall (Felt dizzy and fell in the kitchen earlier today and hit his head. Unsure if he lost consciousness. Lac to lower lip and forehead. Bleeding well controlled. Takes 81 mg asa daily.)      HPI:  Calderon Burt Jr. is a 74 y.o. male past medical history of diabetes, hypertension, hyperlipidemia presenting with headache, forehead laceration, lip laceration after a syncopal episode prior to arrival.  Patient states that he was walking to the kitchen to get some food, he got a little dizzy and then the next thing he knows he was on the floor.  He fell forward on his face.  He reports a mild headache, no visual changes, nausea or vomiting.  He reports the most discomfort of the right shoulder/collarbone.  He states he can move the arm so he does not think anything is broken but he has a lot of pain in the right collar bone and right side of his neck.  He also has a small laceration to the left side of his forehead and the right side as the lower inner lip.  He denies preceding chest pain shortness of breath and denies these symptoms currently as well.  States he recently went to his PCP and had a normal physical exam.  He has been compliant with all of his medications.  He takes a baby aspirin everyday otherwise he is not on blood thinners.  He has not taken any medication for his symptoms.  States he feels back to his baseline currently.      This is the extent to the patients complaints today here in the emergency department.    ROS: As per HPI   Review of patient's allergies indicates:  No Known Allergies    PMH:  As per HPI and below:  Past Medical History:   Diagnosis Date    Cataract     Diabetes mellitus     Glaucoma     High cholesterol     Hypertension     Tendonitis      Past Surgical History:   Procedure Laterality Date    ARTHROSCOPIC REPAIR OF ROTATOR CUFF OF SHOULDER Right 8/28/2020    Procedure: REPAIR, ROTATOR CUFF, ARTHROSCOPIC;  Surgeon: CHENCHO Grace  MD Eric;  Location: [facility] OR;  Service: Orthopedics;  Laterality: Right;  regional with catheter-interscalene  pericapsular injection: 30cc clonidine/epi/ketorolac/ropivacaine injection    CARPAL TUNNEL RELEASE      right/left    CATARACT EXTRACTION, BILATERAL      CORONARY ANGIOGRAPHY N/A 6/13/2023    Procedure: ANGIOGRAM, CORONARY ARTERY;  Surgeon: Kevin Ham MD;  Location: [facility] CATH LAB;  Service: Cardiology;  Laterality: N/A;    FIXATION OF TENDON Right 8/28/2020    Procedure: FIXATION, TENDON-BICEPS TENODESIS;  Surgeon: CHENCHO Reyes MD;  Location: [facility] OR;  Service: Orthopedics;  Laterality: Right;  FIXATION, TENDON-BICEPS TENODESIS    HEMORRHOID SURGERY      LEFT HEART CATHETERIZATION Left 6/13/2023    Procedure: Left heart cath;  Surgeon: Kevin Ham MD;  Location: [facility] CATH LAB;  Service: Cardiology;  Laterality: Left;    SHOULDER OPEN ROTATOR CUFF REPAIR      left    WRIST SURGERY      right/left       Social History     Tobacco Use    Smoking status: Former     Passive exposure: Past    Smokeless tobacco: Never   Substance Use Topics    Alcohol use: No    Drug use: No       Physical Exam:    /74   Pulse 78   Temp 98 °F (36.7 °C)   Resp 18   Ht 6' (1.829 m)   Wt 85.3 kg (188 lb)   SpO2 96%   BMI 25.50 kg/m²   Nursing note and vital signs reviewed.  Appearance: No acute distress.  Head/Face:  A proximally 1.5 cm abrasion to left side of forehead, a proximally 1 cm jagged laceration on the internal right lower lip   Eyes:  Conjunctiva normal.  No subconjunctival hemorrhage.  Extraocular muscles are intact.  Neck: No Midline cervical tenderness, step-offs or deformities.  Full range of motion.    Back: No midline thoracic, lumbar or sacral spine tenderness, step-offs or deformities.    Chest: No chest wall tenderness.  Breath sounds are equal bilaterally.  No wheezes.  No rhonchi.  No rales.  Cardiovascular: Regular rate and rhythm.  No murmurs.  No gallops.  No rubs.  Abdomen:  Soft. Nontender.   No distention.  No guarding. No rebound.  No ecchymoses. Non-peritoneal.  Musculoskeletal:  Tenderness palpation of right collar bone and pain elicited with movement of the right shoulder. Good range of motion of all other joints.  No bony tenderness in the extremities.  No deformities.  No soft tissue tenderness.   Integument: No ecchymoses or other signs of trauma.  Neurologic: Motor intact.  Sensation intact.  Cranial nerves II through XII intact.  Cerebellar exam intact. Neurovascularly intact  Mental status: Alert and oriented x 3.  GCS 15.    Labs that have been ordered have been independently reviewed and interpreted by myself.        Labs Reviewed   HEPATITIS C ANTIBODY - Abnormal       Result Value    Hepatitis C Ab Positive (*)     Narrative:     Release to patient->Immediate   CBC W/ AUTO DIFFERENTIAL - Abnormal    WBC 10.97      RBC 4.75      Hemoglobin 15.2      Hematocrit 44.4      MCV 94      MCH 32.0 (*)     MCHC 34.2      RDW 12.5      Platelets 206      MPV 10.2      Immature Granulocytes 0.5      Gran # (ANC) 8.1 (*)     Immature Grans (Abs) 0.05 (*)     Lymph # 1.9      Mono # 0.8      Eos # 0.1      Baso # 0.06      nRBC 0      Gran % 74.0 (*)     Lymph % 17.1 (*)     Mono % 6.9      Eosinophil % 1.0      Basophil % 0.5      Differential Method Automated      Narrative:     Release to patient->Immediate   COMPREHENSIVE METABOLIC PANEL - Abnormal    Sodium 133 (*)     Potassium 4.5      Chloride 99      CO2 20 (*)     Glucose 195 (*)     BUN 26 (*)     Creatinine 1.7 (*)     Calcium 9.8      Total Protein 8.1      Albumin 4.0      Total Bilirubin 0.5      Alkaline Phosphatase 60      AST 48 (*)     ALT 54 (*)     eGFR 42 (*)     Anion Gap 14      Narrative:     Release to patient->Immediate   URINALYSIS, REFLEX TO URINE CULTURE - Abnormal    Specimen UA Urine, Clean Catch      Color, UA Yellow      Appearance, UA Clear      pH, UA 5.0      Specific Gravity, UA >1.030 (*)      Protein, UA Negative      Glucose, UA 4+ (*)     Ketones, UA Negative      Bilirubin (UA) Negative      Occult Blood UA Negative      Nitrite, UA Negative      Urobilinogen, UA Negative      Leukocytes, UA Negative      Narrative:     Specimen Source->Urine   URINALYSIS MICROSCOPIC - Abnormal    RBC, UA 0      WBC, UA 1      Bacteria Occasional      Yeast, UA None      Squam Epithel, UA 0      Hyaline Casts, UA 8 (*)     Microscopic Comment SEE COMMENT      Narrative:     Specimen Source->Urine   POCT GLUCOSE - Abnormal    POCT Glucose 214 (*)    HIV 1 / 2 ANTIBODY    HIV 1/2 Ag/Ab Negative      Narrative:     Release to patient->Immediate   TROPONIN I    Troponin I <0.006     B-TYPE NATRIURETIC PEPTIDE   B-TYPE NATRIURETIC PEPTIDE    BNP <10      Narrative:     Release to patient->Immediate   HEPATITIS C RNA, QUANTITATIVE, PCR   HEPATITIS C RNA, QUANTITATIVE, PCR   POCT GLUCOSE MONITORING CONTINUOUS       Imaging Results              X-Ray Shoulder Complete 2 View Right (Final result)  Result time 01/09/25 19:50:06      Final result by Verónica Reyna MD (01/09/25 19:50:06)                   Impression:      No acute osseous abnormality identified.      Electronically signed by: Verónica Reyna MD  Date:    01/09/2025  Time:    19:50               Narrative:    EXAMINATION:  XR SHOULDER COMPLETE 2 OR MORE VIEWS RIGHT    CLINICAL HISTORY:  Unspecified fall, initial encounter    TECHNIQUE:  Three views of the right shoulder were performed.    COMPARISON:  August 2020.    FINDINGS:  No evidence of acute displaced fracture, dislocation, or osseous destructive process.  Minor degenerative changes are seen in the glenohumeral joint.                                       X-Ray Chest PA And Lateral (Final result)  Result time 01/09/25 20:23:37      Final result by Verónica Reyna MD (01/09/25 20:23:37)                   Impression:      No acute cardiopulmonary process identified.      Electronically signed by: Verónica  MD Maribell  Date:    01/09/2025  Time:    20:23               Narrative:    EXAMINATION:  XR CHEST PA AND LATERAL    CLINICAL HISTORY:  Syncope and collapse    TECHNIQUE:  PA and lateral views of the chest were performed.    COMPARISON:  February 2023.    FINDINGS:  Cardiac silhouette is normal in size.  Lungs are symmetrically expanded.  No evidence of focal consolidative process, pneumothorax, or significant pleural effusion.  No acute osseous abnormality identified.                                       CT Cervical Spine Without Contrast (Final result)  Result time 01/09/25 20:20:20      Final result by Verónica Reyna MD (01/09/25 20:20:20)                   Impression:      No evidence of acute cervical spine fracture or dislocation.      Electronically signed by: Verónica Reyna MD  Date:    01/09/2025  Time:    20:20               Narrative:    EXAMINATION:  CT CERVICAL SPINE WITHOUT CONTRAST    CLINICAL HISTORY:  Neck trauma (Age >= 65y);    TECHNIQUE:  Low dose axial images, sagittal and coronal reformations were performed though the cervical spine.  Contrast was not administered.    COMPARISON:  None    FINDINGS:  No evidence of acute cervical spine fracture or dislocation.  Odontoid process is intact.  Craniocervical junction is unremarkable.  Cervical spine alignment is within normal limits.    Surrounding soft tissues show no significant abnormalities.  Airway is patent.  Partially visualized lung apices are clear.                                       CT Head Without Contrast (Final result)  Result time 01/09/25 19:36:36      Final result by Verónica Reyna MD (01/09/25 19:36:36)                   Impression:      No acute intracranial abnormalities identified.      Electronically signed by: Verónica Reyna MD  Date:    01/09/2025  Time:    19:36               Narrative:    EXAMINATION:  CT HEAD WITHOUT CONTRAST    CLINICAL HISTORY:  Head trauma, minor (Age >= 65y);    TECHNIQUE:  Low dose axial  images were obtained through the head.  Coronal and sagittal reformations were also performed. Contrast was not administered.    COMPARISON:  CT head from December 2019.    FINDINGS:  Small stable regions of encephalomalacia are seen in the inferior left frontotemporal lobes which may relate to remote injury or infarction.  No evidence of acute/recent major vascular distribution cerebral infarction, intraparenchymal hemorrhage, or intra-axial space occupying lesion. The ventricular system is normal in size and configuration with no evidence of hydrocephalus. No effacement of the skull-base cisterns. No abnormal extra-axial fluid collections or blood products. Visualized paranasal sinuses and mastoid air cells are clear. The calvarium shows no significant abnormality.                                      Initial Impression/ Differential Dx:  Urgent evaluation of 74 y.o. male presenting after syncopal episode. Patient is afebrile, not toxic appearing and hemodynamically stable.  Imaging ordered from tele triage has resulted at time of initial assessment.  No acute intracranial abnormality on CT head, no osseous abnormality on CT cervical spine, CXR or x-ray of right shoulder.  Will treat symptoms with Toradol.  Offered patient laceration repair on his inner lip to facilitate quicker healing, however patient states that he is afraid of needles and he would rather not.  Repair is not absolutely necessary and will heal on its own.  Counseled patient to rinse out the area after eating to make sure that no food gets trapped.  Patient feels back to his baseline.  No chest pain or shortness of breath, doubt ACS, PE.  Considered CVA/TIA, however neurologically intact without focal deficits,    Differential Diagnosis includes, but is not limited to:  Arrhythmia, aortic dissection, MI/unstable angina, PE, cardiogenic shock, CHF, CVA/TIA, intracranial lesion/mass, seizure, perforated viscous, ruptured AAA, orthostatic  hypotension, vasovagal episode, anemia, dehydration, medication reaction, intentional overdose     EKG independently interpreted shows normal sinus rhythm at a rate of 69 beats per minute, prolonged QRS, LBBB, T wave abnormality in inferolateral leads however present on prior EKG, no STEMI    MDM:    No leukocytosis, normal H&H.  CMP with hyperglycemia, pseudohyponatremia, corrected sodium WNL, stable CKD, slight elevation of liver enzymes.  Troponin and BNP normal.  Hepatitis C antibody positive, will obtain RNA.  Counseled patient that he will only be notified if confirmatory test is positive and then he will be connected with treatment.  No evidence of UTI or ketonuria.  Patient states that he feels at his baseline and feels comfortable going home.  Patient given strict return to ED precautions and instructed to follow-up with his cardiologist as well as his PCP.  Patient is amenable to this plan and discharged home in good condition. Patient educated on signs and symptoms to monitor for and when to return to ED. Patient verbalized understanding agrees with treatment plan. All questions and concerns addressed.                  Diagnostic Impression:    1. Syncope, unspecified syncope type    2. Syncope    3. Fall    4. Acute pain of right shoulder    5. Lip laceration, initial encounter    6. Abrasion of forehead, initial encounter         ED Disposition Condition    Discharge Good            ED Prescriptions    None       Follow-up Information       Follow up With Specialties Details Why Contact Info    Jigar Durant III, MD Internal Medicine   [address]  [address]  [facility] [id number] [phone]      Kevin Ham MD Cardiology, Interventional Cardiology   [address]  [address]  [facility] [address]  [phone]               Weston Garcia NP  01/10/25 0033

## 2025-01-13 LAB
HCV RNA SERPL NAA+PROBE-LOG IU: 6.73 LOGIU/ML
HCV RNA SERPL QL NAA+PROBE: DETECTED
HCV RNA SPEC NAA+PROBE-ACNC: ABNORMAL IU/ML

## 2025-01-14 DIAGNOSIS — B17.10 ACUTE HEPATITIS C VIRUS INFECTION WITHOUT HEPATIC COMA: Primary | ICD-10-CM

## 2025-01-15 ENCOUNTER — TELEPHONE (OUTPATIENT)
Dept: HEPATOLOGY | Facility: CLINIC | Age: 75
End: 2025-01-15
Payer: MEDICARE

## 2025-01-15 NOTE — TELEPHONE ENCOUNTER
FEMI Witt ordered that patient be scheduled for a hepatology consult visit for hep c.  Attempt made to reach patient to setup consult visit with PA Scheuermann.  I left a VM asking that he call back for scheduling.

## 2025-02-03 ENCOUNTER — HOSPITAL ENCOUNTER (OUTPATIENT)
Dept: CARDIOLOGY | Facility: OTHER | Age: 75
Discharge: HOME | End: 2025-02-03
Attending: INTERNAL MEDICINE
Payer: MEDICARE

## 2025-02-03 DIAGNOSIS — R55 SYNCOPE: ICD-10-CM

## 2025-02-03 PROCEDURE — 93227 XTRNL ECG REC<48 HR R&I: CPT | Mod: ,,, | Performed by: INTERNAL MEDICINE

## 2025-02-03 PROCEDURE — 93225 XTRNL ECG REC<48 HRS REC: CPT

## 2025-02-05 ENCOUNTER — TELEPHONE (OUTPATIENT)
Dept: CARDIOLOGY | Facility: CLINIC | Age: 75
End: 2025-02-05
Payer: MEDICARE

## 2025-02-05 LAB
OHS CV EVENT MONITOR DAY: 0
OHS CV HOLTER LENGTH DECIMAL HOURS: 23.98
OHS CV HOLTER LENGTH HOURS: 23
OHS CV HOLTER LENGTH MINUTES: 59
OHS CV HOLTER SINUS AVERAGE HR: 80
OHS CV HOLTER SINUS MAX HR: 126
OHS CV HOLTER SINUS MIN HR: 52

## 2025-02-05 NOTE — TELEPHONE ENCOUNTER
----- Message from Meredith sent at 2/5/2025  9:04 AM CST -----  Contact: patient  Type:  Patient Call          Who Called:Patient         Does the patient know what this is regarding?: Requesting a call back ;pt is confused about his appointment dates ; I told the patient what I seen on the chart but he said that he had a visit scheduled for today ; please advise           Would the patient rather a call back or a response via [facility]?call           Best Call Back Number: [phone]             Additional Information: He said that he received a text

## 2025-02-10 ENCOUNTER — OFFICE VISIT (OUTPATIENT)
Dept: HEPATOLOGY | Facility: CLINIC | Age: 75
End: 2025-02-10
Payer: MEDICARE

## 2025-02-10 ENCOUNTER — LAB VISIT (OUTPATIENT)
Dept: LAB | Facility: HOSPITAL | Age: 75
End: 2025-02-10
Payer: MEDICARE

## 2025-02-10 VITALS — HEIGHT: 72 IN | BODY MASS INDEX: 26.31 KG/M2 | WEIGHT: 194.25 LBS

## 2025-02-10 DIAGNOSIS — B18.2 CHRONIC HEPATITIS C WITHOUT HEPATIC COMA: ICD-10-CM

## 2025-02-10 DIAGNOSIS — B18.2 CHRONIC HEPATITIS C WITHOUT HEPATIC COMA: Primary | ICD-10-CM

## 2025-02-10 DIAGNOSIS — B17.10 ACUTE HEPATITIS C VIRUS INFECTION WITHOUT HEPATIC COMA: ICD-10-CM

## 2025-02-10 LAB
HAV IGG SER QL IA: NORMAL
HBV CORE AB SERPL QL IA: NORMAL
HBV SURFACE AB SER-ACNC: <3 MIU/ML
HBV SURFACE AB SER-ACNC: NORMAL M[IU]/ML
HBV SURFACE AG SERPL QL IA: NORMAL

## 2025-02-10 PROCEDURE — 99213 OFFICE O/P EST LOW 20 MIN: CPT | Mod: PBBFAC | Performed by: PHYSICIAN ASSISTANT

## 2025-02-10 PROCEDURE — 86706 HEP B SURFACE ANTIBODY: CPT | Mod: 91 | Performed by: PHYSICIAN ASSISTANT

## 2025-02-10 PROCEDURE — 36415 COLL VENOUS BLD VENIPUNCTURE: CPT | Performed by: PHYSICIAN ASSISTANT

## 2025-02-10 PROCEDURE — 99203 OFFICE O/P NEW LOW 30 MIN: CPT | Mod: S$PBB,,, | Performed by: PHYSICIAN ASSISTANT

## 2025-02-10 PROCEDURE — 86790 VIRUS ANTIBODY NOS: CPT | Performed by: PHYSICIAN ASSISTANT

## 2025-02-10 PROCEDURE — 86704 HEP B CORE ANTIBODY TOTAL: CPT | Performed by: PHYSICIAN ASSISTANT

## 2025-02-10 PROCEDURE — 87340 HEPATITIS B SURFACE AG IA: CPT | Performed by: PHYSICIAN ASSISTANT

## 2025-02-10 PROCEDURE — 99999 PR PBB SHADOW E&M-EST. PATIENT-LVL III: CPT | Mod: PBBFAC,,, | Performed by: PHYSICIAN ASSISTANT

## 2025-02-10 RX ORDER — SPIRONOLACTONE 25 MG/1
TABLET ORAL
COMMUNITY
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
## 2025-02-13 DIAGNOSIS — I50.22 HEART FAILURE, SYSTOLIC, CHRONIC: ICD-10-CM

## 2025-02-13 DIAGNOSIS — I10 PRIMARY HYPERTENSION: ICD-10-CM

## 2025-02-13 RX ORDER — SPIRONOLACTONE 25 MG/1
25 TABLET ORAL DAILY
Qty: 90 TABLET | Refills: 3 | Status: SHIPPED | OUTPATIENT
Start: 2025-02-13

## 2025-02-13 RX ORDER — BIMATOPROST 0.1 MG/ML
SOLUTION/ DROPS OPHTHALMIC
Qty: 5 ML | Refills: 12 | Status: SHIPPED | OUTPATIENT
Start: 2025-02-13

## 2025-02-13 NOTE — TELEPHONE ENCOUNTER
----- Message from Roz sent at 2/13/2025  4:22 PM CST -----  Regarding: self  Type: RX Refill Request     Who Called:self     Have you contacted your pharmacy:     Refill     RX Name and Strength:spironolactone (ALDACTONE) 25 MG tablet     Preferred Pharmacy with phone number:       [facility]  [address] [address]  Phone: [phone] Fax: [phone]         Local or Mail Order:local     Would the patient rather a call back or a response via [facility]?call     Best Call Back Number: [phone]       Additional Information:     Thank you.

## 2025-02-14 LAB
HCV RNA SERPL NAA+PROBE-ACNC: ABNORMAL IU/ML
HCV RNA SERPL NAA+PROBE-LOG IU: 6.76 LOGIU/ML

## 2025-02-18 ENCOUNTER — OFFICE VISIT (OUTPATIENT)
Dept: CARDIOLOGY | Facility: CLINIC | Age: 75
End: 2025-02-18
Attending: INTERNAL MEDICINE
Payer: MEDICARE

## 2025-02-18 VITALS
WEIGHT: 195.13 LBS | SYSTOLIC BLOOD PRESSURE: 120 MMHG | HEIGHT: 72 IN | DIASTOLIC BLOOD PRESSURE: 61 MMHG | HEART RATE: 78 BPM | BODY MASS INDEX: 26.43 KG/M2 | OXYGEN SATURATION: 99 %

## 2025-02-18 DIAGNOSIS — E11.59 TYPE 2 DIABETES MELLITUS WITH OTHER CIRCULATORY COMPLICATION, WITHOUT LONG-TERM CURRENT USE OF INSULIN: ICD-10-CM

## 2025-02-18 DIAGNOSIS — Z87.898 HISTORY OF CHEST PAIN: ICD-10-CM

## 2025-02-18 DIAGNOSIS — I50.22 HEART FAILURE, SYSTOLIC, CHRONIC: ICD-10-CM

## 2025-02-18 DIAGNOSIS — I25.10 CORONARY ARTERY DISEASE INVOLVING NATIVE CORONARY ARTERY OF NATIVE HEART WITHOUT ANGINA PECTORIS: ICD-10-CM

## 2025-02-18 DIAGNOSIS — I10 PRIMARY HYPERTENSION: ICD-10-CM

## 2025-02-18 DIAGNOSIS — I44.7 LEFT BUNDLE BRANCH BLOCK: ICD-10-CM

## 2025-02-18 DIAGNOSIS — E78.1 HYPERTRIGLYCERIDEMIA: ICD-10-CM

## 2025-02-18 DIAGNOSIS — E78.00 HYPERCHOLESTEROLEMIA: ICD-10-CM

## 2025-02-18 DIAGNOSIS — N18.31 STAGE 3A CHRONIC KIDNEY DISEASE: ICD-10-CM

## 2025-02-18 DIAGNOSIS — Z86.73 HISTORY OF CEREBROVASCULAR ACCIDENT: ICD-10-CM

## 2025-02-18 PROCEDURE — 99214 OFFICE O/P EST MOD 30 MIN: CPT | Mod: PBBFAC | Performed by: INTERNAL MEDICINE

## 2025-02-18 NOTE — PROGRESS NOTES
Subjective:     Calderon Burt Jr. is a 74 y.o. male with hypertension, hypercholesterolemia, hypertriglyceridemia and diabetes mellitus type 2. He has a healthy weight but used to be overweight. In 12/2019 he suffered a thalamic cerebrovascular accident. He presented with weakness in the left arm. In 4/2021 he began to experience a left sided chest discomfort. The pain came on when he was moving furniture at home. He was noted to have a left bundle branch block. On 6/14/2021 he had an echocardiogram that revealed normal left ventricular size with low normal systolic function with an ejection fraction of 55%. The septum contracted as with left bundle branch block. There was mild diastolic dysfunction. On 6/14/2021 he had a regadenoson MPI that revealed normal perfusion. The ejection fraction was 63%. Accordingly, there was nothing to suggest obstructive coronary artery disease. It appeared the chest pain most certainly was non cardiac in origin. He has not had any chest pain since. On 6/2/2023 he had an echocardiogram that revealed normal left ventricular size with moderate to severe systolic dysfunction. There was dyssynchrony as with left bundle branch block. The ejection fraction was 30%. There was mild mitral regurgitation. With a significant decrease in systolic dysfunction in the setting of several risk factors for vascular disease he was referred for coronary angiography. On 6/13/202 he underwent coronary angiography. The left main was very short with moderate calcification. The left anterior descending coronary artery had an osteal 40% lesion and proximal 30% disease. The first diagonal  branch had osteal 70% disease. The left circumflex coronary artery had osteal 30% disease. The right coronary artery was dominant with distal 30% disease. It was felt he should be treated medically for his coronary artery disease. On 11/5/2023 he felt sharp left shoulder and back pain. The pain got worse when moving his left  arm. The chest pain later resolved. He walks two laps in [place] a few times a week. On 5/4/2024 he had nausea and vomited once. In 5/2024 he was bothered by a dry cough. No exertional shortness of breath. No palpitations or weak spells. No issues with any of his prescribed medications. Feeling overall well.       Chest Pain   This is a chronic problem. The current episode started more than 1 year ago. The problem has been resolved. Associated symptoms include back pain and a cough. Pertinent negatives include no abdominal pain, claudication, diaphoresis, dizziness, exertional chest pressure, fever, headaches, hemoptysis, irregular heartbeat, leg pain, lower extremity edema, malaise/fatigue, nausea, near-syncope, numbness, orthopnea, palpitations, PND, shortness of breath, sputum production, syncope, vomiting or weakness.   His past medical history is significant for CAD, CHF, diabetes and hyperlipidemia.   Pertinent negatives for past medical history include no muscle weakness.   Cerebrovascular Accident  This is a chronic problem. Associated symptoms include chest pain, coughing and neck pain. Pertinent negatives include no abdominal pain, anorexia, arthralgias, change in bowel habit, chills, congestion, diaphoresis, fatigue, fever, headaches, joint swelling, myalgias, nausea, numbness, rash, sore throat, swollen glands, urinary symptoms, vertigo, visual change, vomiting or weakness.   Hypertension  This is a chronic problem. The current episode started more than 1 year ago. The problem is controlled (usually 125-130/75-80 mmHg at home). Associated symptoms include chest pain and neck pain. Pertinent negatives include no anxiety, blurred vision, headaches, malaise/fatigue, orthopnea, palpitations, peripheral edema, PND, shortness of breath or sweats. There is no history of chronic renal disease.   Hyperlipidemia  This is a chronic problem. The current episode started more than 1 year ago. Recent lipid tests  were reviewed and are normal. Exacerbating diseases include diabetes. He has no history of chronic renal disease, hypothyroidism, liver disease, obesity or nephrotic syndrome. Associated symptoms include chest pain. Pertinent negatives include no focal sensory loss, focal weakness, leg pain, myalgias or shortness of breath.   Congestive Heart Failure  Presents for follow-up visit. Associated symptoms include chest pain. Pertinent negatives include no abdominal pain, chest pressure, claudication, edema, fatigue, muscle weakness, near-syncope, nocturia, orthopnea, palpitations, paroxysmal nocturnal dyspnea or shortness of breath. His past medical history is significant for CAD.   Coronary Artery Disease  Presents for follow-up visit. Symptoms include chest pain. Pertinent negatives include no chest pressure, chest tightness, dizziness, leg swelling, muscle weakness, palpitations, shortness of breath or weight gain. Risk factors include hyperlipidemia. Risk factors do not include obesity. His past medical history is significant for CHF. The symptoms have been stable.       Review of Systems   Constitutional: Negative for chills, diaphoresis, fatigue, fever, malaise/fatigue and weight gain.   HENT:  Negative for congestion, nosebleeds and sore throat.    Eyes:  Negative for blurred vision, double vision, vision loss in left eye and vision loss in right eye.   Cardiovascular:  Positive for chest pain. Negative for claudication, dyspnea on exertion, irregular heartbeat, leg swelling, near-syncope, orthopnea, palpitations, paroxysmal nocturnal dyspnea and syncope.   Respiratory:  Positive for cough. Negative for chest tightness, hemoptysis, shortness of breath, sputum production and wheezing.    Endocrine: Negative for cold intolerance and heat intolerance.   Hematologic/Lymphatic: Negative for bleeding problem. Does not bruise/bleed easily.   Skin:  Negative for color change and rash.   Musculoskeletal:  Positive for back  pain, joint pain (left shoulder) and neck pain. Negative for arthralgias, falls, joint swelling, muscle weakness and myalgias.   Gastrointestinal:  Negative for abdominal pain, anorexia, change in bowel habit, heartburn, hematemesis, hematochezia, hemorrhoids, jaundice, melena, nausea and vomiting.   Genitourinary:  Negative for dysuria, hematuria and nocturia.   Neurological:  Negative for dizziness, focal weakness, headaches, light-headedness, loss of balance, numbness, tremors, vertigo and weakness.   Psychiatric/Behavioral:  Negative for altered mental status, depression and memory loss. The patient is not nervous/anxious.    Allergic/Immunologic: Negative for hives and persistent infections.       Current Outpatient Medications on File Prior to Visit   Medication Sig Dispense Refill    aspirin (ECOTRIN) 81 MG EC tablet Take 1 tablet (81 mg total) by mouth once daily. 90 tablet 3    atorvastatin (LIPITOR) 40 MG tablet Take 1 tablet (40 mg total) by mouth once daily. 90 tablet 3    bimatoprost (LUMIGAN) 0.01 % Drop Instill 1 drop Both Eyes every evening 5 mL 12    brimonidine-timoloL (COMBIGAN) 0.2-0.5 % Drop Place 1 drop into both eyes.      carvediloL (COREG) 6.25 MG tablet Take 1 tablet (6.25 mg total) by mouth 2 (two) times daily. 180 tablet 3    empagliflozin (JARDIANCE) 10 mg tablet Take 1 tablet (10 mg total) by mouth once daily. 90 tablet 3    metFORMIN (GLUMETZA) 1000 MG (MOD) 24 hr tablet Take 1,000 mg by mouth 2 (two) times daily with meals.      psyllium (METAMUCIL) powder Take 1 packet by mouth once daily. Pt takes about twice a week      sacubitriL-valsartan (ENTRESTO)  mg per tablet Take 1 tablet by mouth 2 (two) times daily. 180 tablet 3    spironolactone (ALDACTONE) 25 MG tablet 1 tablet Orally      bimatoprost (LUMIGAN) 0.01 % Drop Apply 1 drop Both Eyes every evening (Patient not taking: Reported on 2/18/2025) 7.5 mL 3    bimatoprost (LUMIGAN) 0.01 % Drop Instill 1 drop Both Eyes every  evening (Patient not taking: Reported on 2/18/2025) 7.5 mL 3    bimatoprost (LUMIGAN) 0.01 % Drop Instill 1 drop Both Eyes every evening (Patient not taking: Reported on 2/18/2025) 7.5 mL 3    sofosbuvir-velpatasvir (EPCLUSA) 400-100 mg Tab 1 tablet Orally Once a day 30 days (Patient not taking: Reported on 2/18/2025) 30 tablet 3    spironolactone (ALDACTONE) 25 MG tablet Take 1 tablet (25 mg total) by mouth once daily. (Patient not taking: Reported on 2/18/2025) 90 tablet 3    tamsulosin (FLOMAX) 0.4 mg Cap Take 0.4 mg by mouth once daily. (Patient not taking: Reported on 12/18/2024)       No current facility-administered medications on file prior to visit.       /61   Pulse 78   Ht 6' (1.829 m)   Wt 88.5 kg (195 lb 1.7 oz)   SpO2 99%   BMI 26.46 kg/m²     Objective:     Physical Exam  Constitutional:       General: He is not in acute distress.     Appearance: Normal appearance. He is well-developed. He is not toxic-appearing or diaphoretic.   HENT:      Head: Normocephalic and atraumatic.      Nose: Nose normal.   Eyes:      General:         Right eye: No discharge.         Left eye: No discharge.      Conjunctiva/sclera:      Right eye: Right conjunctiva is not injected.      Left eye: Left conjunctiva is not injected.      Pupils: Pupils are equal.      Right eye: Pupil is round.      Left eye: Pupil is round.   Neck:      Thyroid: No thyromegaly.      Vascular: No carotid bruit or JVD.   Cardiovascular:      Rate and Rhythm: Normal rate and regular rhythm. No extrasystoles are present.     Chest Wall: PMI is not displaced.      Pulses:           Radial pulses are 2+ on the right side and 2+ on the left side.        Femoral pulses are 2+ on the right side and 2+ on the left side.       Dorsalis pedis pulses are 2+ on the right side and 2+ on the left side.        Posterior tibial pulses are 2+ on the right side and 2+ on the left side.      Heart sounds: S1 normal and S2 normal. No murmur heard.      Gallop present. S4 sounds present. No S3 sounds.   Pulmonary:      Effort: Pulmonary effort is normal.      Breath sounds: Normal breath sounds.   Chest:      Chest wall: No swelling or tenderness.   Abdominal:      Palpations: Abdomen is soft.      Tenderness: There is no abdominal tenderness.   Musculoskeletal:      Cervical back: Neck supple.      Right ankle: No swelling, deformity or ecchymosis.      Left ankle: No swelling, deformity or ecchymosis.   Lymphadenopathy:      Head:      Right side of head: No submandibular adenopathy.      Left side of head: No submandibular adenopathy.      Cervical: No cervical adenopathy.   Skin:     General: Skin is warm and dry.      Findings: No rash.      Nails: There is no clubbing.      Comments: Mass consistent with lipoma right upper back.   Neurological:      General: No focal deficit present.      Mental Status: He is alert and oriented to person, place, and time. He is not disoriented.      Cranial Nerves: No cranial nerve deficit.   Psychiatric:         Attention and Perception: Attention and perception normal.         Mood and Affect: Mood and affect normal.         Speech: Speech normal.         Behavior: Behavior normal.         Thought Content: Thought content normal.         Cognition and Memory: Cognition and memory normal.         Judgment: Judgment normal.       Assessment:     1. Heart failure, systolic, chronic    2. Left bundle branch block    3. Coronary artery disease involving native coronary artery of native heart without angina pectoris    4. History of chest pain    5. History of cerebrovascular accident    6. Primary hypertension    7. Hypercholesterolemia    8. Hypertriglyceridemia    9. Type 2 diabetes mellitus with other circulatory complication, without long-term current use of insulin    10. Stage 3a chronic kidney disease          Plan:     Heart Failure, Systolic, Chronic              6/2/2023: Echo: Normal left ventricular size with  moderate to severe systolic dysfunction. LBBB. EF 30%. Mild MR.   12/6/2023: Echo: Normal left ventricular size with severe systolic dysfunction. Anteroseptal wall and apex severely hypokinetic. Remainder moderately hypokinetic. LBBB. EF 30%. Mild diastolic dysfunction   12/2/2024: Echo: Normal left ventricular size with mild systolic dysfunction. LBBB. Septum moderately hypokinetic. Remainder contract well. EF 45%. LVGLS - 45%. Mild diastolic dysfunction.   6/8/2023: Carvedilol 6.25 mg Q12 and empagliflozin 10 mg Q24 were begun.    7/7/2023: Spironolactone 25 mg Q24 was begun.   10/18/2023: Carvedilol 6.25 mg Q12 was reduced to carvedilol 3.125 mg Q12 and benazepril 20 mg Q24 was reduced to benazepril 5 mg Q24 as blood pressure on low side.   5/8/2024: Sacubitril 24 mg/valsartan 26 mg Q12 was begun and benazepril 5 mg Q24 was discontinued.    7/8/2024: Sacubitril 24 mg/valsartan 26 mg Q12 was increased to sacubitril 49 mg/valsartan 51 mg Q12.   8/27/2024: Sacubitril 49 mg/valsartan 51 mg Q12 was increased to sacubitril 97 mg/valsartan 103 mg Q12.   12/18/2024: Carvedilol 3.125 mg Q12 was increased to carvedilol 6.25 mg Q12 as HR 91 bpm , pressure 111/60 and no low readings.   On carvedilol 6.25 mg Q12, empagliflozin 10 mg Q24, sacubitril 97 mg/valsartan 103 mg Q12 and spironolactone 25 mg Q24.  Possibly CRT long term but QRS quite narrow.  Appears well compensated.  12/2025: Plan next Echo.     2. Left Bundle Branch Block              2021: Diagnosed.              6/8/2023: SR. LBBB 134 ms.    11/6/2023: SR. LBBB.  ms.   12/18/2024: NSR. LBBB.  ms.   He has LBBB but QRS only 136 ms.     3. Coronary Artery Disease  6/13/2023: [facility]: Cor: LM: Very short. Moderate calcification. LAD: Osteal 40%. Prox 30%. D1 osteal 70%. LCX: Osteal 30%. RCA: Dominant. Distal 30%.   Medical therapy.  On atorvastatin 40 mg Q24.  On aspirin 81 mg Q24.     4. History of Chest Pain              4/2021: Began experience chest  pain.              12/17/2019: Echo: Normal left ventricular size and systolic function.              6/14/2021: Echo: Normal left ventricular size with low normal systolic function. EF 55%. LBBB. Mild diastolic dysfunction.               6/14/2021: Regadenoson MPI: Normal perfusion. EF 63%.   11/5/2023: Atypical left shoulder and chest pain.              Chest pain is most certainly non-cardiac in origin.              Reassurance.   Resolved.                5. History of Cerebrovascular Accident              12/2019: Thalamic CVA. Left arm weakness.              All risk factors are addressed.              On aspirin 81 mg Q24.                6. Hypertension              2016: Diagnosed.               7/12/2021: Amlodipine 10 mg Q24 was begun as running high.    7/7/2023: Spironolactone 25 mg Q24 was begun and amlodipine 10 mg Q24 was discontinued.    On carvedilol 6.25 mg Q12, sacubitril 49 mg/valsartan 51 mg Q12 and spironolactone 25 mg Q24.              Keeping log at home.              Well controlled.                7. Hypercholesterolemia              2016: Began statin.               On atorvastatin 40 mg Q24.              12/16/2019: Chol 153. HDL 46. LDL 62. .              8/26/2022: Chol 139. HDL 54. LDL 66. .   7/6/2024: Chol 141. HDL 56. LDL 61. .              On atorvastatin 40 mg Q24.              Very favorable lipid panel.     8. Hypertriglyceridemia              2016: Diagnosed.              As above.     9. Diabetes Mellitus, Type 2              2018: Diagnosed. Complications: CVA. Medications: Oral agent.  6/8/2023: Empagliflozin 10 mg Q24 was begun for HF.               On metformin 500 mg Q12 and empagliflozin 10 mg Q24.   7/6/2024: HgbA1C 7.1%.               Tells me well controlled.     10. History of Overweight              6/3/2021: Weight 86 kg. BMI 26.   10/18/2023: Weight 82 kg. BMI 25.     11. Chronic Kidney Disease, Stage 3   7/6/2024: BUN/crea 17/1.3. eGFR 59. K  4.6.     12. Primary Care              Dr. Jigar Durant.    F/u 4 months.    Kevin Ham M.D.

## 2025-03-06 ENCOUNTER — HOSPITAL ENCOUNTER (OUTPATIENT)
Dept: RADIOLOGY | Facility: OTHER | Age: 75
Discharge: HOME | End: 2025-03-06
Attending: PHYSICIAN ASSISTANT
Payer: MEDICARE

## 2025-03-06 DIAGNOSIS — B18.2 CHRONIC HEPATITIS C WITHOUT HEPATIC COMA: ICD-10-CM

## 2025-03-06 PROCEDURE — 76705 ECHO EXAM OF ABDOMEN: CPT | Mod: TC

## 2025-03-06 PROCEDURE — 76705 ECHO EXAM OF ABDOMEN: CPT | Mod: 26,,, | Performed by: RADIOLOGY

## 2025-03-13 ENCOUNTER — PROCEDURE VISIT (OUTPATIENT)
Dept: HEPATOLOGY | Facility: CLINIC | Age: 75
End: 2025-03-13
Payer: MEDICARE

## 2025-03-13 ENCOUNTER — OFFICE VISIT (OUTPATIENT)
Dept: HEPATOLOGY | Facility: CLINIC | Age: 75
End: 2025-03-13
Payer: MEDICARE

## 2025-03-13 VITALS — WEIGHT: 196 LBS | HEIGHT: 72 IN | BODY MASS INDEX: 26.55 KG/M2

## 2025-03-13 DIAGNOSIS — B18.2 CHRONIC HEPATITIS C WITHOUT HEPATIC COMA: ICD-10-CM

## 2025-03-13 DIAGNOSIS — B18.2 CHRONIC HEPATITIS C WITHOUT HEPATIC COMA: Primary | ICD-10-CM

## 2025-03-13 DIAGNOSIS — R74.8 ABNORMAL TRANSAMINASES: ICD-10-CM

## 2025-03-13 PROCEDURE — 91200 LIVER ELASTOGRAPHY: CPT | Mod: 26,S$PBB,, | Performed by: PHYSICIAN ASSISTANT

## 2025-03-13 PROCEDURE — 99999 PR PBB SHADOW E&M-EST. PATIENT-LVL III: CPT | Mod: PBBFAC,,, | Performed by: PHYSICIAN ASSISTANT

## 2025-03-13 PROCEDURE — 99213 OFFICE O/P EST LOW 20 MIN: CPT | Mod: S$PBB,,, | Performed by: PHYSICIAN ASSISTANT

## 2025-03-13 PROCEDURE — 99213 OFFICE O/P EST LOW 20 MIN: CPT | Mod: PBBFAC | Performed by: PHYSICIAN ASSISTANT

## 2025-03-13 PROCEDURE — 91200 LIVER ELASTOGRAPHY: CPT | Mod: PBBFAC | Performed by: PHYSICIAN ASSISTANT

## 2025-03-13 RX ORDER — VELPATASVIR AND SOFOSBUVIR 100; 400 MG/1; MG/1
1 TABLET, FILM COATED ORAL DAILY
Qty: 28 TABLET | Refills: 2 | Status: SHIPPED | OUTPATIENT
Start: 2025-03-13
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
## 2025-03-13 NOTE — PROCEDURES
FibroScan Transplant Hepatology(Vibration Controlled Transient Elastography)    Date/Time: 3/13/2025 8:00 AM    Performed by: Scheuermann, Jennifer B., PA  Authorized by: Scheuermann, Jennifer B., PA    Diagnosis:  HCV    Probe:  M    Universal Protocol: Patient's identity, procedure and site were verified, confirmatory pause was performed.  Discussed procedure including risks and potential complications.  Questions answered.  Patient verbalizes understanding and wishes to proceed with VCTE.     Procedure: After providing explanations of the procedure, patient was placed in the supine position with right arm in maximum abduction to allow optimal exposure of right lateral abdomen.  Patient was briefly assessed, Testing was performed in the mid-axillary location, 50Hz Shear Wave pulses were applied and the resulting Shear Wave and Propagation Speed detected with a 3.5 MHz ultrasonic signal, using the FibroScan probe, Skin to liver capsule distance and liver parenchyma were accessed during the entire examination with the FibroScan probe, Patient was instructed to breathe normally and to abstain from sudden movements during the procedure, allowing for random measurements of liver stiffness. At least 10 Shear Waves were produced, Individual measurements of each Shear Wave were calculated.  Patient tolerated the procedure well with no complications.  Meets discharge criteria as was dismissed.  Rates pain 0 out of 10.  Patient will follow up with ordering provider to review results.    Findings  Median liver stiffness score:  6.2  CAP Reading: dB/m:  187    IQR/med %:  6  Interpretation  Fibrosis interpretation is based on medial liver stiffness - Kilopascal (kPa).    Fibrosis Stage:  F 0-1  Steatosis interpretation is based on controlled attenuation parameter - (dB/m).    Steatosis Grade:  <S1

## 2025-03-17 PROBLEM — B18.2 CHRONIC HEPATITIS C WITHOUT HEPATIC COMA: Status: ACTIVE | Noted: 2025-03-17

## 2025-03-26 ENCOUNTER — TELEPHONE (OUTPATIENT)
Dept: HEPATOLOGY | Facility: CLINIC | Age: 75
End: 2025-03-26
Payer: MEDICARE

## 2025-03-26 DIAGNOSIS — B18.2 CHRONIC HEPATITIS C WITHOUT HEPATIC COMA: Primary | ICD-10-CM

## 2025-03-26 NOTE — TELEPHONE ENCOUNTER
Pt beginning 12 weeks Epclusa on 3/28/25  Anticipated treatment end date: 6/19/25  F 0-1  Bonny 1A  Prior HCV treatment: No        Please review with patient:  Hepatitis C treatment with Epclusa will last 12 weeks. This means TWO more shipments of Epclusa will come from pharmacy. If there are problems getting future shipments please call us right away!  After treatment ends there is a small chance the virus can return. We will do lab work 3 months after treatment ends to see if virus is cured.    Please update episode & schedule labs to see if the virus is cured  - LFT, HCV RNA - SVR12 - 9/19/25      thanks

## 2025-03-27 NOTE — TELEPHONE ENCOUNTER
I spoke with patient and msg from PA Scheuermann relayed and mailed to him.  Lab draw scheduled 9/19/25; appt reminder notice mailed.

## 2025-03-31 ENCOUNTER — TELEPHONE (OUTPATIENT)
Dept: HEPATOLOGY | Facility: CLINIC | Age: 75
End: 2025-03-31
Payer: MEDICARE

## 2025-03-31 NOTE — TELEPHONE ENCOUNTER
I confirmed med start dated of 3/28.  Patient states that he is taking Ecplusa every morning at 8 am and has not had and SEs from med.

## 2025-03-31 NOTE — TELEPHONE ENCOUNTER
----- Message from Med Assistant Zamudio sent at 3/28/2025  4:46 PM CDT -----  Regarding: FW: Consult/Advisory  Contact: Calderon Burt Jr.    ----- Message -----  From: Antonia Morris  Sent: 3/28/2025   3:57 PM CDT  To: Scheuermann Jennifer B Staff  Subject: Consult/Advisory                                  Consult/Advisory Name Of Caller:Calderon Burt Jr.   Contact Preference:[phone]  Nature of call:Patient is calling to let doctor know that he started his medicine today.

## 2025-04-30 DIAGNOSIS — I50.22 HEART FAILURE, SYSTOLIC, CHRONIC: ICD-10-CM

## 2025-06-18 ENCOUNTER — OFFICE VISIT (OUTPATIENT)
Dept: CARDIOLOGY | Facility: CLINIC | Age: 75
End: 2025-06-18
Attending: INTERNAL MEDICINE
Payer: MEDICARE

## 2025-06-18 VITALS
DIASTOLIC BLOOD PRESSURE: 76 MMHG | BODY MASS INDEX: 26.01 KG/M2 | OXYGEN SATURATION: 97 % | HEART RATE: 82 BPM | SYSTOLIC BLOOD PRESSURE: 116 MMHG | HEIGHT: 72 IN | WEIGHT: 192 LBS

## 2025-06-18 DIAGNOSIS — I10 PRIMARY HYPERTENSION: ICD-10-CM

## 2025-06-18 DIAGNOSIS — I50.22 HEART FAILURE, SYSTOLIC, CHRONIC: ICD-10-CM

## 2025-06-18 DIAGNOSIS — I44.7 LEFT BUNDLE BRANCH BLOCK: ICD-10-CM

## 2025-06-18 DIAGNOSIS — Z86.73 HISTORY OF CEREBROVASCULAR ACCIDENT: ICD-10-CM

## 2025-06-18 DIAGNOSIS — E78.00 HYPERCHOLESTEROLEMIA: ICD-10-CM

## 2025-06-18 DIAGNOSIS — E78.1 HYPERTRIGLYCERIDEMIA: ICD-10-CM

## 2025-06-18 DIAGNOSIS — Z87.898 HISTORY OF CHEST PAIN: ICD-10-CM

## 2025-06-18 DIAGNOSIS — E11.59 TYPE 2 DIABETES MELLITUS WITH OTHER CIRCULATORY COMPLICATION, WITHOUT LONG-TERM CURRENT USE OF INSULIN: ICD-10-CM

## 2025-06-18 DIAGNOSIS — N18.31 STAGE 3A CHRONIC KIDNEY DISEASE: ICD-10-CM

## 2025-06-18 DIAGNOSIS — I25.10 CORONARY ARTERY DISEASE INVOLVING NATIVE CORONARY ARTERY OF NATIVE HEART WITHOUT ANGINA PECTORIS: ICD-10-CM

## 2025-06-18 PROCEDURE — 99213 OFFICE O/P EST LOW 20 MIN: CPT | Mod: PBBFAC | Performed by: INTERNAL MEDICINE

## 2025-06-18 PROCEDURE — 99214 OFFICE O/P EST MOD 30 MIN: CPT | Mod: S$PBB,,, | Performed by: INTERNAL MEDICINE

## 2025-06-18 PROCEDURE — 99999 PR PBB SHADOW E&M-EST. PATIENT-LVL III: CPT | Mod: PBBFAC,,, | Performed by: INTERNAL MEDICINE

## 2025-06-18 RX ORDER — ASPIRIN 81 MG/1
81 TABLET ORAL DAILY
Qty: 90 TABLET | Refills: 3 | Status: SHIPPED | OUTPATIENT
Start: 2025-06-18

## 2025-06-18 NOTE — PROGRESS NOTES
Subjective:     Calderon Burt Jr. is a 74 y.o. male with hypertension, hypercholesterolemia, hypertriglyceridemia and diabetes mellitus type 2. He has a healthy weight but used to be overweight. In 12/2019 he suffered a thalamic cerebrovascular accident. He presented with weakness in the left arm. In 4/2021 he began to experience a left sided chest discomfort. The pain came on when he was moving furniture at home. He was noted to have a left bundle branch block. On 6/14/2021 he had an echocardiogram that revealed normal left ventricular size with low normal systolic function with an ejection fraction of 55%. The septum contracted as with left bundle branch block. There was mild diastolic dysfunction. On 6/14/2021 he had a regadenoson MPI that revealed normal perfusion. The ejection fraction was 63%. Accordingly, there was nothing to suggest obstructive coronary artery disease. It appeared the chest pain most certainly was non cardiac in origin. He has not had any chest pain since. On 6/2/2023 he had an echocardiogram that revealed normal left ventricular size with moderate to severe systolic dysfunction. There was dyssynchrony as with left bundle branch block. The ejection fraction was 30%. There was mild mitral regurgitation. With a significant decrease in systolic dysfunction in the setting of several risk factors for vascular disease he was referred for coronary angiography. On 6/13/202 he underwent coronary angiography. The left main was very short with moderate calcification. The left anterior descending coronary artery had an osteal 40% lesion and proximal 30% disease. The first diagonal  branch had osteal 70% disease. The left circumflex coronary artery had osteal 30% disease. The right coronary artery was dominant with distal 30% disease. It was felt he should be treated medically for his coronary artery disease. On 11/5/2023 he felt sharp left shoulder and back pain. The pain got worse when moving his left  arm. The chest pain later resolved. He walks two laps in [employer] a few times a week. On 5/4/2024 he had nausea and vomited once. In 5/2024 he was bothered by a dry cough. In 6/2025 he was able to get around well at [employer]. No exertional shortness of breath. No palpitations or weak spells. No issues with any of his prescribed medications. Feeling overall well.     Chest Pain   This is a chronic problem. The current episode started more than 1 year ago. The problem has been resolved. Associated symptoms include back pain and a cough. Pertinent negatives include no abdominal pain, claudication, diaphoresis, dizziness, exertional chest pressure, fever, headaches, hemoptysis, irregular heartbeat, leg pain, lower extremity edema, malaise/fatigue, nausea, near-syncope, numbness, orthopnea, palpitations, PND, shortness of breath, sputum production, syncope, vomiting or weakness.   His past medical history is significant for CAD, CHF, diabetes and hyperlipidemia.   Pertinent negatives for past medical history include no muscle weakness.   Cerebrovascular Accident  This is a chronic problem. Associated symptoms include chest pain, coughing and neck pain. Pertinent negatives include no abdominal pain, anorexia, arthralgias, change in bowel habit, chills, congestion, diaphoresis, fatigue, fever, headaches, joint swelling, myalgias, nausea, numbness, rash, sore throat, swollen glands, urinary symptoms, vertigo, visual change, vomiting or weakness.   Hypertension  This is a chronic problem. The current episode started more than 1 year ago. The problem is controlled (usually 125-130/75-80 mmHg at home). Associated symptoms include chest pain and neck pain. Pertinent negatives include no anxiety, blurred vision, headaches, malaise/fatigue, orthopnea, palpitations, peripheral edema, PND, shortness of breath or sweats. There is no history of chronic renal disease.   Hyperlipidemia  This is a chronic problem. The current  episode started more than 1 year ago. Recent lipid tests were reviewed and are normal. Exacerbating diseases include diabetes. He has no history of chronic renal disease, hypothyroidism, liver disease, obesity or nephrotic syndrome. Associated symptoms include chest pain. Pertinent negatives include no focal sensory loss, focal weakness, leg pain, myalgias or shortness of breath.   Congestive Heart Failure  Presents for follow-up visit. Associated symptoms include chest pain. Pertinent negatives include no abdominal pain, chest pressure, claudication, edema, fatigue, muscle weakness, near-syncope, nocturia, orthopnea, palpitations, paroxysmal nocturnal dyspnea or shortness of breath. His past medical history is significant for CAD.   Coronary Artery Disease  Presents for follow-up visit. Symptoms include chest pain. Pertinent negatives include no chest pressure, chest tightness, dizziness, leg swelling, muscle weakness, palpitations, shortness of breath or weight gain. Risk factors include hyperlipidemia. Risk factors do not include obesity. His past medical history is significant for CHF. The symptoms have been stable.     Review of Systems   Constitutional: Negative for chills, diaphoresis, fatigue, fever, malaise/fatigue and weight gain.   HENT:  Negative for congestion, nosebleeds and sore throat.    Eyes:  Negative for blurred vision, double vision, vision loss in left eye and vision loss in right eye.   Cardiovascular:  Positive for chest pain. Negative for claudication, dyspnea on exertion, irregular heartbeat, leg swelling, near-syncope, orthopnea, palpitations, paroxysmal nocturnal dyspnea and syncope.   Respiratory:  Positive for cough. Negative for chest tightness, hemoptysis, shortness of breath, sputum production and wheezing.    Endocrine: Negative for cold intolerance and heat intolerance.   Hematologic/Lymphatic: Negative for bleeding problem. Does not bruise/bleed easily.   Skin:  Negative for color  change and rash.   Musculoskeletal:  Positive for back pain, joint pain (left shoulder) and neck pain. Negative for arthralgias, falls, joint swelling, muscle weakness and myalgias.   Gastrointestinal:  Negative for abdominal pain, anorexia, change in bowel habit, heartburn, hematemesis, hematochezia, hemorrhoids, jaundice, melena, nausea and vomiting.   Genitourinary:  Negative for dysuria, hematuria and nocturia.   Neurological:  Negative for dizziness, focal weakness, headaches, light-headedness, loss of balance, numbness, tremors, vertigo and weakness.   Psychiatric/Behavioral:  Negative for altered mental status, depression and memory loss. The patient is not nervous/anxious.    Allergic/Immunologic: Negative for hives and persistent infections.     Current Outpatient Medications on File Prior to Visit   Medication Sig Dispense Refill    aspirin (ECOTRIN) 81 MG EC tablet Take 1 tablet (81 mg total) by mouth once daily. 90 tablet 3    atorvastatin (LIPITOR) 40 MG tablet Take 1 tablet (40 mg total) by mouth once daily. 90 tablet 3    bimatoprost (LUMIGAN) 0.01 % Drop Instill 1 drop Both Eyes every evening 5 mL 12    brimonidine-timoloL (COMBIGAN) 0.2-0.5 % Drop Place 1 drop into both eyes.      carvediloL (COREG) 6.25 MG tablet Take 1 tablet (6.25 mg total) by mouth 2 (two) times daily. 180 tablet 3    empagliflozin (JARDIANCE) 10 mg tablet Take 1 tablet (10 mg total) by mouth once daily. 90 tablet 3    metFORMIN (GLUMETZA) 1000 MG (MOD) 24 hr tablet Take 1,000 mg by mouth 2 (two) times daily with meals.      psyllium (METAMUCIL) powder Take 1 packet by mouth once daily. Pt takes about twice a week      sacubitriL-valsartan (ENTRESTO)  mg per tablet Take 1 tablet by mouth 2 (two) times daily. 180 tablet 3    spironolactone (ALDACTONE) 25 MG tablet Take 1 tablet (25 mg total) by mouth once daily. 90 tablet 3    bimatoprost (LUMIGAN) 0.01 % Drop Apply 1 drop Both Eyes every evening (Patient not taking:  Reported on 3/13/2025) 7.5 mL 3    bimatoprost (LUMIGAN) 0.01 % Drop Instill 1 drop Both Eyes every evening (Patient not taking: Reported on 3/13/2025) 7.5 mL 3    bimatoprost (LUMIGAN) 0.01 % Drop Instill 1 drop Both Eyes every evening (Patient not taking: Reported on 3/13/2025) 7.5 mL 3    empagliflozin (JARDIANCE) 10 mg tablet Take 1 tablet (10 mg total) by mouth once daily. 30 tablet 11    hepatitis A and B vaccine, PF, (TWINRIX) 720 LOAN unit- 20 mcg/mL Syrg suspension Inject 1mL IM at 0, 1, and 6 months 1 mL 2    promethazine-dextromethorphan (PROMETHAZINE-DM) 6.25-15 mg/5 mL Syrp take 10 mL by mouth as needed every 6 hours 7 days 280 mL 1    sofosbuvir-velpatasvir 400-100 mg Tab Take 1 tablet by mouth once daily. 28 tablet 2     No current facility-administered medications on file prior to visit.     Objective:     /76   Pulse 82   Ht 6' (1.829 m)   Wt 87.1 kg (192 lb 0.3 oz)   SpO2 97%   BMI 26.04 kg/m²     Physical Exam  Constitutional:       General: He is not in acute distress.     Appearance: Normal appearance. He is well-developed. He is not toxic-appearing or diaphoretic.   HENT:      Head: Normocephalic and atraumatic.      Nose: Nose normal.   Eyes:      General:         Right eye: No discharge.         Left eye: No discharge.      Conjunctiva/sclera:      Right eye: Right conjunctiva is not injected.      Left eye: Left conjunctiva is not injected.      Pupils: Pupils are equal.      Right eye: Pupil is round.      Left eye: Pupil is round.   Neck:      Thyroid: No thyromegaly.      Vascular: No carotid bruit or JVD.   Cardiovascular:      Rate and Rhythm: Normal rate and regular rhythm. No extrasystoles are present.     Chest Wall: PMI is not displaced.      Pulses:           Radial pulses are 2+ on the right side and 2+ on the left side.        Femoral pulses are 2+ on the right side and 2+ on the left side.       Dorsalis pedis pulses are 2+ on the right side and 2+ on the left side.         Posterior tibial pulses are 2+ on the right side and 2+ on the left side.      Heart sounds: S1 normal and S2 normal. No murmur heard.     Gallop present. S4 sounds present. No S3 sounds.   Pulmonary:      Effort: Pulmonary effort is normal.      Breath sounds: Normal breath sounds.   Chest:      Chest wall: No swelling or tenderness.   Abdominal:      Palpations: Abdomen is soft.      Tenderness: There is no abdominal tenderness.   Musculoskeletal:      Cervical back: Neck supple.      Right ankle: No swelling, deformity or ecchymosis.      Left ankle: No swelling, deformity or ecchymosis.   Lymphadenopathy:      Head:      Right side of head: No submandibular adenopathy.      Left side of head: No submandibular adenopathy.      Cervical: No cervical adenopathy.   Skin:     General: Skin is warm and dry.      Findings: No rash.      Nails: There is no clubbing.      Comments: Mass consistent with lipoma right upper back.   Neurological:      General: No focal deficit present.      Mental Status: He is alert and oriented to person, place, and time. He is not disoriented.      Cranial Nerves: No cranial nerve deficit.   Psychiatric:         Attention and Perception: Attention and perception normal.         Mood and Affect: Mood and affect normal.         Speech: Speech normal.         Behavior: Behavior normal.         Thought Content: Thought content normal.         Cognition and Memory: Cognition and memory normal.         Judgment: Judgment normal.       Assessment:     1. Heart failure, systolic, chronic    2. Left bundle branch block    3. Coronary artery disease involving native coronary artery of native heart without angina pectoris    4. History of chest pain    5. History of cerebrovascular accident    6. Primary hypertension    7. Hypercholesterolemia    8. Hypertriglyceridemia    9. Type 2 diabetes mellitus with other circulatory complication, without long-term current use of insulin    10. Stage 3a  chronic kidney disease      Plan:     Heart Failure, Systolic, Chronic  Nonischemic cardiomyopathy.              6/2/2023: Echo: Normal left ventricular size with moderate to severe systolic dysfunction. LBBB. EF 30%. Mild MR.   12/6/2023: Echo: Normal left ventricular size with severe systolic dysfunction. Anteroseptal wall and apex severely hypokinetic. Remainder moderately hypokinetic. LBBB. EF 30%. Mild diastolic dysfunction   12/2/2024: Echo: Normal left ventricular size with mild systolic dysfunction. LBBB. Septum moderately hypokinetic. Remainder contract well. EF 45%. LVGLS - 45%. Mild diastolic dysfunction.   6/8/2023: Carvedilol 6.25 mg Q12 and empagliflozin 10 mg Q24 were begun.    7/7/2023: Spironolactone 25 mg Q24 was begun.   10/18/2023: Carvedilol 6.25 mg Q12 was reduced to carvedilol 3.125 mg Q12 and benazepril 20 mg Q24 was reduced to benazepril 5 mg Q24 as blood pressure on low side.   5/8/2024: Sacubitril 24 mg/valsartan 26 mg Q12 was begun and benazepril 5 mg Q24 was discontinued.    7/8/2024: Sacubitril 24 mg/valsartan 26 mg Q12 was increased to sacubitril 49 mg/valsartan 51 mg Q12.   8/27/2024: Sacubitril 49 mg/valsartan 51 mg Q12 was increased to sacubitril 97 mg/valsartan 103 mg Q12.   12/18/2024: Carvedilol 3.125 mg Q12 was increased to carvedilol 6.25 mg Q12 as HR 91 bpm, pressure 111/60 and no low readings.   On carvedilol 6.25 mg Q12, empagliflozin 10 mg Q24, sacubitril 97 mg/valsartan 103 mg Q12 and spironolactone 25 mg Q24.  Possibly CRT long term but QRS quite narrow.  Asymptomatic.  Appears well compensated.  12/2025: Plan next Echo.     2. Left Bundle Branch Block              2021: Diagnosed.              6/8/2023: SR. LBBB 134 ms.    11/6/2023: SR. LBBB.  ms.   12/18/2024: NSR. LBBB.  ms.   He has LBBB but QRS only 136 ms.     3. Coronary Artery Disease  6/13/2023: [facility]: Cor: LM: Very short. Moderate calcification. LAD: Osteal 40%. Prox 30%. D1 osteal 70%. LCX:  Osteal 30%. RCA: Dominant. Distal 30%.   Medical therapy.  On atorvastatin 40 mg Q24.  On aspirin 81 mg Q24.  Doing well.  Asymptomatic.     4. History of Chest Pain              4/2021: Began experience chest pain.              12/17/2019: Echo: Normal left ventricular size and systolic function.              6/14/2021: Echo: Normal left ventricular size with low normal systolic function. EF 55%. LBBB. Mild diastolic dysfunction.               6/14/2021: Regadenoson MPI: Normal perfusion. EF 63%.   11/5/2023: Atypical left shoulder and chest pain.              Chest pain is most certainly non-cardiac in origin.              Reassurance.   Resolved.                5. History of Cerebrovascular Accident              12/2019: Thalamic CVA. Left arm weakness.              All risk factors are addressed.              On aspirin 81 mg Q24.                6. Hypertension              2016: Diagnosed.               7/12/2021: Amlodipine 10 mg Q24 was begun as running high.    7/7/2023: Spironolactone 25 mg Q24 was begun and amlodipine 10 mg Q24 was discontinued.    On carvedilol 6.25 mg Q12, sacubitril 49 mg/valsartan 51 mg Q12 and spironolactone 25 mg Q24.              Keeping log at home.              Well controlled.                7. Hypercholesterolemia              2016: Began statin.               On atorvastatin 40 mg Q24.              12/16/2019: Chol 153. HDL 46. LDL 62. .              8/26/2022: Chol 139. HDL 54. LDL 66. .   7/6/2024: Chol 141. HDL 56. LDL 61. .              On atorvastatin 40 mg Q24.              Very favorable lipid panel.     8. Hypertriglyceridemia              2016: Diagnosed.              As above.     9. Diabetes Mellitus, Type 2              2018: Diagnosed. Complications: CVA. Medications: Oral agent.  6/8/2023: Empagliflozin 10 mg Q24 was begun for HF.               On metformin 500 mg Q12 and empagliflozin 10 mg Q24.   7/6/2024: HgbA1C 7.1%.               Tells me  well controlled.     10. Overweight              6/3/2021: Weight 86 kg. BMI 26.   10/18/2023: Weight 82 kg. BMI 25.     11. Chronic Kidney Disease, Stage 3   7/6/2024: BUN/crea 17/1.3. eGFR 59. K 4.6.   1/9/2025: BUN/crea 26/1.7. eGFR 42. K 4.5.     12. Primary Care              Dr. Jigar Durant.    F/u 4 months.    Kevin Ham M.D.

## 2025-07-06 ENCOUNTER — HOSPITAL ENCOUNTER (EMERGENCY)
Facility: HOSPITAL | Age: 75
Discharge: HOME | End: 2025-07-07
Attending: STUDENT IN AN ORGANIZED HEALTH CARE EDUCATION/TRAINING PROGRAM
Payer: MEDICARE

## 2025-07-06 DIAGNOSIS — T78.3XXA ANGIOEDEMA, INITIAL ENCOUNTER: Primary | ICD-10-CM

## 2025-07-06 PROCEDURE — 96374 THER/PROPH/DIAG INJ IV PUSH: CPT

## 2025-07-06 PROCEDURE — 99284 EMERGENCY DEPT VISIT MOD MDM: CPT | Mod: 25

## 2025-07-06 PROCEDURE — 96375 TX/PRO/DX INJ NEW DRUG ADDON: CPT

## 2025-07-06 RX ORDER — DIPHENHYDRAMINE HYDROCHLORIDE 50 MG/ML
25 INJECTION, SOLUTION INTRAMUSCULAR; INTRAVENOUS
Status: COMPLETED | OUTPATIENT
Start: 2025-07-07 | End: 2025-07-06

## 2025-07-06 RX ORDER — FAMOTIDINE 10 MG/ML
20 INJECTION, SOLUTION INTRAVENOUS
Status: COMPLETED | OUTPATIENT
Start: 2025-07-07 | End: 2025-07-06

## 2025-07-06 RX ADMIN — DIPHENHYDRAMINE HYDROCHLORIDE 25 MG: 50 INJECTION, SOLUTION INTRAMUSCULAR; INTRAVENOUS at 11:07

## 2025-07-06 RX ADMIN — FAMOTIDINE 20 MG: 10 INJECTION, SOLUTION INTRAVENOUS at 11:07

## 2025-07-07 VITALS
HEART RATE: 80 BPM | DIASTOLIC BLOOD PRESSURE: 84 MMHG | SYSTOLIC BLOOD PRESSURE: 128 MMHG | RESPIRATION RATE: 16 BRPM | OXYGEN SATURATION: 99 % | TEMPERATURE: 98 F | BODY MASS INDEX: 25.63 KG/M2 | WEIGHT: 189 LBS

## 2025-07-07 PROCEDURE — 63600175 PHARM REV CODE 636 W HCPCS: Performed by: PHYSICIAN ASSISTANT

## 2025-07-07 RX ORDER — FAMOTIDINE 20 MG/1
20 TABLET, FILM COATED ORAL 2 TIMES DAILY
Qty: 14 TABLET | Refills: 0 | Status: SHIPPED | OUTPATIENT
Start: 2025-07-07 | End: 2025-07-14

## 2025-07-07 RX ORDER — DIPHENHYDRAMINE HCL 25 MG
25 CAPSULE ORAL EVERY 6 HOURS PRN
Qty: 20 CAPSULE | Refills: 0 | Status: SHIPPED | OUTPATIENT
Start: 2025-07-07

## 2025-07-07 RX ORDER — EPINEPHRINE 0.3 MG/.3ML
1 INJECTION SUBCUTANEOUS ONCE
Qty: 2 EACH | Refills: 0 | Status: SHIPPED | OUTPATIENT
Start: 2025-07-07 | End: 2025-07-08

## 2025-07-07 NOTE — DISCHARGE INSTRUCTIONS
Call your cardiologist as soon as possible to schedule a follow-up appointment in the next 1 to 7 days. Stop taking the Entresto until you see your cardiologist to determine if it is safe for you to continue taking.     You can take over-the-counter Benadryl as instructed as needed for itching; caution with use as this medication may make you drowsy (i.e., do not drive or operate heavy machinery while using). If you are prescribed an EpiPen and need to use it, you will need to go to the closest ER for further monitoring after you use it. Return to the ER with any worsening or concerning symptoms including, but not limited to worsening or spreading rash, uncontrollable itching, sensation of your tongue or throat swelling, difficulty breathing, nausea/vomiting/diarrhea after exposure to an allergen, or any additional symptom you believe warrants emergency evaluation.

## 2025-07-07 NOTE — ED PROVIDER NOTES
Encounter Date: 7/6/2025       History     Chief Complaint   Patient presents with    Oral Swelling     Lip swelling, tension headache, patient says he ate spicy food today      74-year-old male with a past medical history of HTN, DM, hyperlipidemia, CAD, CHF, stroke is presenting to the ED with bottom lip swelling 1st noted approximately 6 hours prior to arrival in ED. he has eaten a lot of seafood the past 2 days but states he has never had an issue with seafood before.  He denies any additional new medications, foods, soaps, lotions, or detergents.  He denies difficulty breathing, sensation of intraoral swelling, difficulty swallowing, voice change, rash, nausea, vomiting, or diarrhea.  He kept ice on his lip for an extended period of time but states he does not believe it helped much.  No other therapies attempted prior to arrival.  He is on sacubitril-valsartan.    The history is provided by the patient. No  was used.     Review of patient's allergies indicates:  No Known Allergies  Past Medical History:   Diagnosis Date    Cataract     CHF (congestive heart failure)     Chronic kidney disease, unspecified     Coronary artery disease     Diabetes mellitus     Glaucoma     High cholesterol     Hypertension     Stroke     Tendonitis      Past Surgical History:   Procedure Laterality Date    ARTHROSCOPIC REPAIR OF ROTATOR CUFF OF SHOULDER Right 8/28/2020    Procedure: REPAIR, ROTATOR CUFF, ARTHROSCOPIC;  Surgeon: CHENCHO Reyes MD;  Location: [facility] OR;  Service: Orthopedics;  Laterality: Right;  regional with catheter-interscalene  pericapsular injection: 30cc clonidine/epi/ketorolac/ropivacaine injection    CARPAL TUNNEL RELEASE      right/left    CATARACT EXTRACTION, BILATERAL      CORONARY ANGIOGRAPHY N/A 6/13/2023    Procedure: ANGIOGRAM, CORONARY ARTERY;  Surgeon: Kevin Ham MD;  [place] [facility] [place] [facility] C[facility] LAB;  Service: Cardiology;  Laterality: N/A;    FIXATION OF TENDON Right 8/28/2020     Procedure: FIXATION, TENDON-BICEPS TENODESIS;  Surgeon: CHENCHO Reyes MD;  Location: [facility] OR;  Service: Orthopedics;  Laterality: Right;  FIXATION, TENDON-BICEPS TENODESIS    HEMORRHOID SURGERY      LEFT HEART CATHETERIZATION Left 6/13/2023    Procedure: Left heart cath;  Surgeon: Kevin Ham MD;  Location: [facility] CATH LAB;  Service: Cardiology;  Laterality: Left;    SHOULDER OPEN ROTATOR CUFF REPAIR      left    WRIST SURGERY      right/left     Family History   Problem Relation Name Age of Onset    Diabetes Mother      Alcohol abuse Mother      Hypertension Mother      Stroke Father      Alcohol abuse Father      Hypertension Father      Diabetes Sister      Hypertension Sister      Diabetes Brother      Hypertension Brother      Diabetes Daughter      Diabetes Son      Vision loss Son      Stroke Son       Social History[1]  Review of Systems   HENT:  Positive for facial swelling. Negative for drooling, trouble swallowing and voice change.    Respiratory:  Negative for cough, shortness of breath and stridor.    Cardiovascular:  Negative for chest pain.   Gastrointestinal:  Negative for diarrhea, nausea and vomiting.   All other systems reviewed and are negative.      Physical Exam     Initial Vitals   BP Pulse Resp Temp SpO2   07/06/25 2221 07/06/25 2221 07/06/25 2221 07/07/25 0146 07/06/25 2221   135/78 83 18 98.2 °F (36.8 °C) 98 %      MAP       --                Physical Exam    Vitals reviewed.  Constitutional: Vital signs are normal. He appears well-developed. He is not diaphoretic. He is active.  Non-toxic appearance. He does not have a sickly appearance. He does not appear ill. No distress.   HENT:   Head: Normocephalic and atraumatic. Mouth/Throat: Uvula is midline, oropharynx is clear and moist and mucous membranes are normal. No trismus in the jaw. No uvula swelling. No oropharyngeal exudate, posterior oropharyngeal edema, posterior oropharyngeal erythema or tonsillar abscesses.   Mild  localized swelling to lower lip.  No sublingual edema or swelling.   Eyes: Conjunctivae, EOM and lids are normal.   Neck: Phonation normal. Neck supple. No stridor present.   Tolerating secretions.   Normal range of motion.  Cardiovascular:  Normal rate, regular rhythm and normal heart sounds.           Pulmonary/Chest: Effort normal and breath sounds normal. No apnea. No respiratory distress. He has no decreased breath sounds. He has no wheezes. He has no rhonchi. He has no rales.   Abdominal: Abdomen is soft and flat. There is no abdominal tenderness.   Musculoskeletal:      Cervical back: Normal range of motion and neck supple. No rigidity.     Neurological: He is alert. No cranial nerve deficit or sensory deficit. He exhibits normal muscle tone. Gait normal.   Skin: Skin is warm and dry. No rash noted.          ED Course   Procedures  Labs Reviewed - No data to display       Imaging Results    None          Medications   famotidine (PF) injection 20 mg (20 mg Intravenous Given 7/6/25 3080)   diphenhydrAMINE injection 25 mg (25 mg Intravenous Given 7/6/25 7508)     Medical Decision Making  74-year-old male with a past medical history of HTN, DM, hyperlipidemia, CAD, CHF, stroke presented to the ED with a 6-hour history of localized swelling to lower lip.  He denied any new medications or food, but has eaten a lot of seafood in the past 2 days.  He is on Entresto that contains an ARB.  He was tolerating secretions with normal phonation so I did not believe epinephrine was required at the time as swelling was mild.  We discussed treating and isn't allergic reaction including Benadryl and Pepcid; I discussed steroid use with the patient and his wife, however given his diabetes and potential for steroid induced hyperglycemia they opted to just do the Benadryl and Pepcid.  He did appear stable so I believe that was reasonable at that time.  I discussed the case with Cardiology; see ED course for further details.  The  patient was observed for 3.5 hours which ended up being 9 hours after onset of symptoms.  He was instructed to discontinue Entresto and call Cardiology 1st thing in the morning to discuss medications.  He was discharged in stable condition tolerating secretions with normal phonation with an EpiPen, Benadryl, and Pepcid.    Problems Addressed:  Angioedema, initial encounter: acute illness or injury with systemic symptoms that poses a threat to life or bodily functions    Amount and/or Complexity of Data Reviewed  Discussion of management or test interpretation with external provider(s): Dr. Gama, cardiology fellow on call. Please see ED course for further details on discussion and recommendations.     Risk  OTC drugs.  Prescription drug management.               ED Course as of 07/07/25 0245   Mon Jul 07, 2025   0010 Spoke with on-call Cardiology fellow, Dr. Gama.  He recommends stopping the Entresto for now and having the patient follow closely with outpatient Cardiology for medication adjustments.  This was discussed with the patient and his wife both of whom verbalized understanding and agreed to plan.  The patient's wife stated she will call the patient's cardiologist first thing in the morning. [SE]   0139 Reassessment:  Swelling to lower lip has not worsened.  He is still tolerating secretions and has normal phonation.  He has been observed for 3.5 hours, and total time elapsed since onset of swelling is 9 hours.  I again emphasized importance of stopping the Entresto for now and calling the patient's cardiologist 1st thing in the morning to schedule an appointment for follow up.  Again discussed not doing steroids given the patient's history of diabetes to which both the patient and his wife were agreeable.  Prescription for EpiPen, Benadryl, and Pepcid sent to the requested pharmacy.  Strict return precautions discussed.  All questions answered.  The patient verbalized understanding and agreed to plan.  [SE]      ED Course User Index  [SE] Tammy Roy PA-C                           Clinical Impression:  Final diagnoses:  [T78.3XXA] Angioedema, initial encounter (Primary)          ED Disposition Condition    Discharge           ED Prescriptions       Medication Sig Dispense Start Date End Date Auth. Provider    EPINEPHrine (EPIPEN) 0.3 mg/0.3 mL AtIn (Expires today) Inject 0.3 mLs (0.3 mg total) into the muscle once. for 1 dose 0.3 mL 7/7/2025 7/7/2025 Tammy Roy PA-C    diphenhydrAMINE (BENADRYL) 25 mg capsule Take 1 capsule (25 mg total) by mouth every 6 (six) hours as needed for Itching or Allergies. 20 capsule 7/7/2025 -- Tammy Roy PA-C    famotidine (PEPCID) 20 MG tablet Take 1 tablet (20 mg total) by mouth 2 (two) times daily. for 7 days 14 tablet 7/7/2025 7/14/2025 Tammy Roy PA-C          Follow-up Information       Follow up With Specialties Details Why Contact Info    Kevin Ham MD Interventional Cardiology, Cardiology Call today To schedule an appointment for follow up. [address]  [address]  [facility] [address]  [phone]      [facility] Emergency Medicine Go to  As needed, If symptoms worsen, For any emergent concerns or problems [address] [address] [phone]                   [1]   Social History  Tobacco Use    Smoking status: Former     Types: Cigarettes     Passive exposure: Past    Smokeless tobacco: Never   Substance Use Topics    Alcohol use: Not Currently     Comment: Occasionally    Drug use: No        Tammy Roy PA-C  07/07/25 0246

## 2025-07-08 ENCOUNTER — TELEPHONE (OUTPATIENT)
Dept: CARDIOLOGY | Facility: CLINIC | Age: 75
End: 2025-07-08
Payer: MEDICARE

## 2025-07-08 RX ORDER — VALSARTAN 160 MG/1
160 TABLET ORAL 2 TIMES DAILY
Qty: 180 TABLET | Refills: 3 | Status: SHIPPED | OUTPATIENT
Start: 2025-07-08 | End: 2026-07-08

## 2025-07-08 NOTE — TELEPHONE ENCOUNTER
Copied from CRM #[id number]. Topic: General Inquiry - Patient Advice  >> Jul 7, 2025  1:15 PM Med Assistant Farnaz wrote:  Type: Patient Call Back    Who called: patient    What is the request in detail: Pt states he went ER for a swollen lip and they took him off Entresto  mg. Pt wants to know if something in place of that medication can be called in. He was told that medication may have caused the swelling. Please assist.    Can the clinic reply by [facility]?  No      Would the patient rather a call back or a response via [facility]?   call    Best call back number: [phone] (M)

## (undated) DEVICE — NDL 18GA X1 1/2 REG BEVEL

## (undated) DEVICE — CATH OPTITORQUE RADIAL 5FR

## (undated) DEVICE — SUT ETHICON 3-0 BLK MONO PS

## (undated) DEVICE — BAG DRAINAGE W/SPIKE

## (undated) DEVICE — DRESSING XEROFORM 1X8IN

## (undated) DEVICE — DRAPE STERI U-SHAPED 47X51IN

## (undated) DEVICE — DRESSING AQUACEL AG SILVER 4X4

## (undated) DEVICE — GLOVE BIOGEL SKINSENSE PI 8.0

## (undated) DEVICE — SEE MEDLINE ITEM 152530

## (undated) DEVICE — UNDERGLOVES BIOGEL PI SIZE 8.5

## (undated) DEVICE — KIT GLIDESHEATH SLEND 6FR 10CM

## (undated) DEVICE — BAG SNAP KOVER BAND 36 X 28 IN

## (undated) DEVICE — Device

## (undated) DEVICE — CANNULA PASSPORT 8 MM X 4CM.

## (undated) DEVICE — COVER SANP CLR 36X54

## (undated) DEVICE — NDL ARTHSCP MF SCORPION

## (undated) DEVICE — KIT PROBE COVER WITH GEL

## (undated) DEVICE — CANNULA TWIST IN 7MM X 7CM

## (undated) DEVICE — ELECTRODE REM PLYHSV RETURN 9

## (undated) DEVICE — KIT SHOULDER POSITIONER SPIDER

## (undated) DEVICE — COVER LIGHT HANDLE 80/CA

## (undated) DEVICE — BLADE SHAVER 4.5 6/BX

## (undated) DEVICE — PROBE ARTHO ENERGY 90 DEG

## (undated) DEVICE — TAPE SURG DURAPORE 2 X10YD

## (undated) DEVICE — SOL IRR NACL .9% 3000ML

## (undated) DEVICE — DRAPE ANGIO BRACH 38X44IN

## (undated) DEVICE — BLADE SHAVER LANZA 4.2X13CM

## (undated) DEVICE — APPLICATOR CHLORAPREP ORN 26ML

## (undated) DEVICE — SEE MEDLINE ITEM 152622

## (undated) DEVICE — SUT FIBERWIRE FIBER 2M

## (undated) DEVICE — GOWN SURGICAL XX LARGE X LONG

## (undated) DEVICE — PUMP COLD THERAPY

## (undated) DEVICE — SYR 30CC LUER LOCK

## (undated) DEVICE — SEE MEDLINE ITEM 146313

## (undated) DEVICE — DRAPE STERI INSTRUMENT 1018

## (undated) DEVICE — SLING SHOT II LARGE

## (undated) DEVICE — SEE MEDLINE ITEM 146417

## (undated) DEVICE — GUIDEWIRE ANGIO 1.5MM .035X180

## (undated) DEVICE — UNDERGLOVES BIOGEL PI SIZE 7.5

## (undated) DEVICE — GLOVE BIOGEL PI MICRO INDIC 7

## (undated) DEVICE — HEMOSTAT VASC BAND REG 24CM

## (undated) DEVICE — ADHESIVE MASTISOL VIAL 48/BX

## (undated) DEVICE — PAD SHOULDER CARE POLAR

## (undated) DEVICE — FIBERTAPE 54 #2

## (undated) DEVICE — TUBE SET INFLOW/OUTFLOW

## (undated) DEVICE — PENCIL ROCKER SWITCH 10FT CORD

## (undated) DEVICE — DRESSING XEROFORM FOIL PK 1X8

## (undated) DEVICE — DRAPE STERI-DRAPE 1000 17X11IN

## (undated) DEVICE — TRAY CORONARY CUSTOM BAPTIST

## (undated) DEVICE — CLOSURE SKIN STERI STRIP 1/2X4